# Patient Record
Sex: MALE | Race: WHITE | NOT HISPANIC OR LATINO | Employment: OTHER | URBAN - NONMETROPOLITAN AREA
[De-identification: names, ages, dates, MRNs, and addresses within clinical notes are randomized per-mention and may not be internally consistent; named-entity substitution may affect disease eponyms.]

---

## 2017-01-18 ENCOUNTER — OFFICE VISIT (OUTPATIENT)
Dept: FAMILY MEDICINE CLINIC | Facility: CLINIC | Age: 82
End: 2017-01-18

## 2017-01-18 VITALS
TEMPERATURE: 97.8 F | RESPIRATION RATE: 16 BRPM | WEIGHT: 215 LBS | HEIGHT: 71 IN | OXYGEN SATURATION: 97 % | SYSTOLIC BLOOD PRESSURE: 157 MMHG | BODY MASS INDEX: 30.1 KG/M2 | HEART RATE: 68 BPM | DIASTOLIC BLOOD PRESSURE: 72 MMHG

## 2017-01-18 DIAGNOSIS — J45.40 MODERATE PERSISTENT ASTHMA WITHOUT COMPLICATION: ICD-10-CM

## 2017-01-18 DIAGNOSIS — N18.2 CHRONIC KIDNEY DISEASE, STAGE 2 (MILD): ICD-10-CM

## 2017-01-18 DIAGNOSIS — I10 ESSENTIAL HYPERTENSION: Primary | ICD-10-CM

## 2017-01-18 DIAGNOSIS — G25.81 RLS (RESTLESS LEGS SYNDROME): ICD-10-CM

## 2017-01-18 PROCEDURE — 99213 OFFICE O/P EST LOW 20 MIN: CPT | Performed by: INTERNAL MEDICINE

## 2017-01-18 PROCEDURE — 83874 ASSAY OF MYOGLOBIN: CPT | Performed by: INTERNAL MEDICINE

## 2017-01-18 RX ORDER — GABAPENTIN 300 MG/1
300 CAPSULE ORAL 2 TIMES DAILY
Qty: 180 CAPSULE | Refills: 3 | Status: SHIPPED | OUTPATIENT
Start: 2017-01-18 | End: 2017-03-03

## 2017-01-18 RX ORDER — HYDROCHLOROTHIAZIDE 12.5 MG/1
12.5 CAPSULE, GELATIN COATED ORAL DAILY
Qty: 90 CAPSULE | Refills: 3 | Status: SHIPPED | OUTPATIENT
Start: 2017-01-18 | End: 2018-09-06

## 2017-01-18 RX ORDER — SERTRALINE HYDROCHLORIDE 25 MG/1
25 TABLET, FILM COATED ORAL DAILY
Qty: 90 TABLET | Refills: 3 | Status: SHIPPED | OUTPATIENT
Start: 2017-01-18 | End: 2018-09-06

## 2017-01-18 RX ORDER — LOSARTAN POTASSIUM 100 MG/1
100 TABLET ORAL DAILY
Qty: 90 TABLET | Refills: 1 | Status: SHIPPED | OUTPATIENT
Start: 2017-01-18 | End: 2018-01-31 | Stop reason: SDUPTHER

## 2017-01-18 RX ORDER — PRAMIPEXOLE DIHYDROCHLORIDE 1 MG/1
1 TABLET ORAL NIGHTLY
Qty: 90 TABLET | Refills: 2 | Status: SHIPPED | OUTPATIENT
Start: 2017-01-18 | End: 2017-03-03 | Stop reason: SDUPTHER

## 2017-01-18 NOTE — MR AVS SNAPSHOT
Sukumar Cosby   1/18/2017 2:00 PM   Office Visit    Dept Phone:  [phone]   Encounter #:  [id number]    Provider:  Yobany Nolasco MD   Department:  [facility] PRIMARY CARE                Your Full Care Plan              Today's Medication Changes          These changes are accurate as of: 1/18/17  3:16 PM.  If you have any questions, ask your nurse or doctor.               New Medication(s)Ordered:     hydrochlorothiazide 12.5 MG capsule   Commonly known as:  MICROZIDE   Take 1 capsule by mouth Daily.   Started by:  Yobany Nolasco MD         Medication(s)that have changed:     gabapentin 300 MG capsule   Commonly known as:  NEURONTIN   Take 1 capsule by mouth 2 (Two) Times a Day.   What changed:  how much to take   Changed by:  Yobany Nolasco MD            Where to Get Your Medications      These medications were sent to [id number] - [place], [place] - [address] [id number] [id number]   [address] [facility] [address]     Phone:  [phone]     gabapentin 300 MG capsule    hydrochlorothiazide 12.5 MG capsule    losartan 100 MG tablet    pramipexole 1 MG tablet    sertraline 25 MG tablet                  Your Updated Medication List          This list is accurate as of: 1/18/17  3:16 PM.  Always use your most recent med list.                Abdominal Binder/Elastic 2XL misc       amLODIPine 5 MG tablet   Commonly known as:  NORVASC   Take 1.5 tablets by mouth Daily.       beclomethasone 80 MCG/ACT inhaler   Commonly known as:  QVAR   Inhale 1 puff 2 (Two) Times a Day.       gabapentin 300 MG capsule   Commonly known as:  NEURONTIN   Take 1 capsule by mouth 2 (Two) Times a Day.       hydrochlorothiazide 12.5 MG capsule   Commonly known as:  MICROZIDE   Take 1 capsule by mouth Daily.       losartan 100 MG tablet   Commonly known as:  COZAAR   Take 1 tablet by mouth Daily.       meclizine 25 MG tablet   Commonly known as:  ANTIVERT       OCUVITE ADULT 50+ PO       Omeprazole 20 MG tablet delayed-release       pramipexole 1 MG tablet   Commonly known as:  MIRAPEX   Take 1 tablet by mouth Every Night.       predniSONE 10 MG tablet   Commonly known as:  DELTASONE   Take 1 tablet by mouth Daily.       sertraline 25 MG tablet   Commonly known as:  ZOLOFT   Take 1 tablet by mouth Daily.       Spacer/Aero Chamber Mouthpiece misc   1 can 2 (Two) Times a Day.       VENTOLIN  (90 BASE) MCG/ACT inhaler   Generic drug:  albuterol       ZOSTAVAX 99607 UNT/0.65ML injection   Generic drug:  zoster vaccine live PF               You Were Diagnosed With        Codes Comments    Essential hypertension    -  Primary ICD-10-CM: I10  ICD-9-CM: 401.9     Chronic kidney disease, stage 2 (mild)     ICD-10-CM: N18.2  ICD-9-CM: 585.2     RLS (restless legs syndrome)     ICD-10-CM: G25.81  ICD-9-CM: 333.94     Moderate persistent asthma without complication     ICD-10-CM: J45.40  ICD-9-CM: 493.90       Instructions     None    Patient Instructions History      Upcoming Appointments     Visit Type Date Time Department    OFFICE VISIT 2017  2:00 PM [facility]    OFFICE VISIT 2017  1:00 PM [facility]      [facility]     [facility] allows you to send messages to your doctor, view your test results, renew your prescriptions, schedule appointments, and more. To sign up, go to [employer] and click on the Sign Up Now link in the New User? box. Enter your [employer] Activation Code exactly as it appears below along with the last four digits of your Social Security Number and your Date of Birth () to complete the sign-up process. If you do not sign up before the expiration date, you must request a new code.    Ag[employer]i Activation Code: [email or web address]  Expires: 2017  3:15 PM    If you have questions, you can email [email or web address] or call [phone] to talk to our [employer] staff. Remember, [employer] is NOT  "to be used for urgent needs. For medical emergencies, dial 911.               Other Info from Your Visit           Your Appointments     Apr 21, 2017  1:00 PM EDT   Office Visit with Yobany Nolasco MD   [facility] PRIMARY CARE (--)    [address] [place] [id number] [phone]           Arrive 15 minutes prior to appointment.              Allergies     No Known Allergies      Reason for Visit     Hypertension 6 week follow-up    Chronic Kidney Disease 6 week follow-up    Restless Legs Syndrome 6 week follow-up      Vital Signs     Blood Pressure Pulse Temperature Respirations Height Weight    157/72 (BP Location: Left arm, Patient Position: Sitting, Cuff Size: Large Adult) 68 97.8 °F (36.6 °C) (Oral) 16 71\" (180.3 cm) 215 lb (97.5 kg)    Oxygen Saturation Body Mass Index Smoking Status             97% 29.99 kg/m2 Never Smoker         Problems and Diagnoses Noted     Chronic kidney disease    High blood pressure    RLS (restless legs syndrome)        Asthma            "

## 2017-01-18 NOTE — PROGRESS NOTES
"Subjective   Patient ID: Sukumar Cosby is a 82 y.o. male Pt is here for management of multiple medical problems.  History of Present Illness  Pt is here for management of multiple medical problems.    No soa and off o2. order sent in at his request.\        The following portions of the patient's history were reviewed and updated as appropriate: allergies, current medications, past family history, past medical history, past social history, past surgical history and problem list.  Review of Systems   Constitutional: Negative for fatigue.   Respiratory: Negative for shortness of breath.    All other systems reviewed and are negative.      Objective     Visit Vitals   • /72 (BP Location: Left arm, Patient Position: Sitting, Cuff Size: Large Adult)   • Pulse 68   • Temp 97.8 °F (36.6 °C) (Oral)   • Resp 16   • Ht 71\" (180.3 cm)   • Wt 215 lb (97.5 kg)   • SpO2 97%   • BMI 29.99 kg/m2     Physical Exam  General Appearance:    Alert, cooperative, no distress, appears stated age   Head:    Normocephalic, without obvious abnormality, atraumatic   Eyes:    PERRL, conjunctiva/corneas clear, EOM's intact           Ears:    Normal TM's and external ear canals, both ears   Nose:   Nares normal, septum midline, mucosa normal, no drainage    or sinus tenderness   Throat:   Lips, mucosa, and tongue normal; teeth and gums normal   Neck:   Supple, symmetrical, trachea midline, no adenopathy;        thyroid:  No enlargement/tenderness/nodules; no carotid    bruit or JVD   Back:     Symmetric, no curvature, ROM normal, no CVA tenderness   Lungs:     Wheezing with prolonged bs b/l to auscultation bilaterally, respirations unlabored   Chest wall:    No tenderness or deformity   Heart:    Regular rate and rhythm, S1 and S2 normal, no murmur, rub   or gallop   Abdomen:     Soft, non-tender, bowel sounds active all four quadrants,     no masses, no organomegaly           Extremities:   Extremities normal, atraumatic, no cyanosis or edema "   Pulses:   2+ and symmetric all extremities   Skin:   Skin color, texture, turgor normal, no rashes or lesions   Lymph nodes:   Cervical, supraclavicular, and axillary nodes normal   Neurologic:   CNII-XII intact. Normal strength, sensation and reflexes       throughout       Assessment/Plan     Still using qvar 80.          Sukumar was seen today for hypertension, chronic kidney disease and restless legs syndrome.    Diagnoses and all orders for this visit:    Essential hypertension  -     losartan (COZAAR) 100 MG tablet; Take 1 tablet by mouth Daily.    Chronic kidney disease, stage 2 (mild)    RLS (restless legs syndrome)  -     pramipexole (MIRAPEX) 1 MG tablet; Take 1 tablet by mouth Every Night.    Moderate persistent asthma without complication    Other orders  -     hydrochlorothiazide (MICROZIDE) 12.5 MG capsule; Take 1 capsule by mouth Daily.  -     sertraline (ZOLOFT) 25 MG tablet; Take 1 tablet by mouth Daily.  -     gabapentin (NEURONTIN) 300 MG capsule; Take 1 capsule by mouth 2 (Two) Times a Day.      No Follow-up on file.                   There are no Patient Instructions on file for this visit.

## 2017-03-03 ENCOUNTER — OFFICE VISIT (OUTPATIENT)
Dept: FAMILY MEDICINE CLINIC | Facility: CLINIC | Age: 82
End: 2017-03-03

## 2017-03-03 ENCOUNTER — HOSPITAL ENCOUNTER (OUTPATIENT)
Dept: GENERAL RADIOLOGY | Facility: HOSPITAL | Age: 82
Discharge: HOME | End: 2017-03-03
Attending: INTERNAL MEDICINE | Admitting: INTERNAL MEDICINE

## 2017-03-03 VITALS
OXYGEN SATURATION: 98 % | DIASTOLIC BLOOD PRESSURE: 62 MMHG | WEIGHT: 211 LBS | HEIGHT: 71 IN | HEART RATE: 74 BPM | BODY MASS INDEX: 29.54 KG/M2 | SYSTOLIC BLOOD PRESSURE: 142 MMHG | TEMPERATURE: 97.9 F | RESPIRATION RATE: 16 BRPM

## 2017-03-03 DIAGNOSIS — I10 ESSENTIAL HYPERTENSION: Primary | ICD-10-CM

## 2017-03-03 DIAGNOSIS — M33.20 POLYMYOSITIS (HCC): ICD-10-CM

## 2017-03-03 DIAGNOSIS — N18.2 CHRONIC KIDNEY DISEASE, STAGE 2 (MILD): ICD-10-CM

## 2017-03-03 DIAGNOSIS — G25.81 RLS (RESTLESS LEGS SYNDROME): ICD-10-CM

## 2017-03-03 DIAGNOSIS — J18.9 CAP (COMMUNITY ACQUIRED PNEUMONIA): ICD-10-CM

## 2017-03-03 PROCEDURE — 99214 OFFICE O/P EST MOD 30 MIN: CPT | Performed by: INTERNAL MEDICINE

## 2017-03-03 PROCEDURE — 71020 HC CHEST PA AND LATERAL: CPT

## 2017-03-03 RX ORDER — DOXYCYCLINE 100 MG/1
100 CAPSULE ORAL 2 TIMES DAILY
Qty: 20 CAPSULE | Refills: 0 | Status: SHIPPED | OUTPATIENT
Start: 2017-03-03 | End: 2017-04-21

## 2017-03-03 RX ORDER — PRAMIPEXOLE DIHYDROCHLORIDE 1 MG/1
1 TABLET ORAL NIGHTLY
Qty: 90 TABLET | Refills: 2 | Status: SHIPPED | OUTPATIENT
Start: 2017-03-03 | End: 2017-09-15 | Stop reason: SDUPTHER

## 2017-03-03 RX ORDER — PREDNISONE 1 MG/1
5 TABLET ORAL DAILY
Qty: 7 TABLET | Refills: 0 | Status: SHIPPED | OUTPATIENT
Start: 2017-03-03 | End: 2017-04-21

## 2017-03-03 RX ORDER — AMLODIPINE BESYLATE 5 MG/1
7.5 TABLET ORAL DAILY
Qty: 135 TABLET | Refills: 3 | Status: SHIPPED | OUTPATIENT
Start: 2017-03-03 | End: 2017-09-14 | Stop reason: SDUPTHER

## 2017-03-03 NOTE — PROGRESS NOTES
"Subjective   Patient ID: Sukumar Cosby is a 83 y.o. male Pt is here for management of multiple medical problems.    Chief Complaint   Patient presents with   • URI     patient cooughing up yellow/phlegm x 2 weeks, chest hurts when coughing   • Shortness of Breath     patient having difficulty getting a deep breath   • medication refills     patient needs refills on Pramipexole, Predisone and Amlodipine [facility]       History of Present Illness    Pt with reoccurring cough and congestion. Some soa.  Chest tightness.     The following portions of the patient's history were reviewed and updated as appropriate: allergies, current medications, past family history, past medical history, past social history, past surgical history and problem list.      Review of Systems   Constitutional: Positive for fatigue.   Respiratory: Positive for cough, shortness of breath and wheezing.    All other systems reviewed and are negative.      Objective     Visit Vitals   • /62 (BP Location: Right arm, Patient Position: Sitting, Cuff Size: Large Adult)   • Pulse 74   • Temp 97.9 °F (36.6 °C) (Oral)   • Resp 16   • Ht 71\" (180.3 cm)   • Wt 211 lb (95.7 kg)   • SpO2 98%   • BMI 29.43 kg/m2     Physical Exam  General Appearance:    Alert, cooperative, no distress, appears stated age   Head:    Normocephalic, without obvious abnormality, atraumatic   Eyes:    PERRL, conjunctiva/corneas clear, EOM's intact           Ears:    Normal TM's and external ear canals, both ears   Nose:   Nares normal, septum midline, mucosa normal, no drainage    or sinus tenderness   Throat:   Lips, mucosa, and tongue normal; teeth and gums normal   Neck:   Supple, symmetrical, trachea midline, no adenopathy;        thyroid:  No enlargement/tenderness/nodules; no carotid    bruit or JVD   Back:     Symmetric, no curvature, ROM normal, no CVA tenderness   Lungs:     Fine crackles in left lower lung to auscultation, respirations unlabored   Chest " wall:    No tenderness or deformity   Heart:    Regular rate and rhythm, S1 and S2 normal, no murmur, rub   or gallop   Abdomen:     Soft, non-tender, bowel sounds active all four quadrants,     no masses, no organomegaly           Extremities:   Extremities normal, atraumatic, no cyanosis or edema   Pulses:   2+ and symmetric all extremities   Skin:   Skin color, texture, turgor normal, no rashes or lesions   Lymph nodes:   Cervical, supraclavicular, and axillary nodes normal   Neurologic:   CNII-XII intact. Normal strength, sensation and reflexes       throughout       Assessment/Plan           Ray was seen today for uri, shortness of breath and medication refills.    Diagnoses and all orders for this visit:    Essential hypertension  -     amLODIPine (NORVASC) 5 MG tablet; Take 1.5 tablets by mouth Daily.    Chronic kidney disease, stage 2 (mild)    RLS (restless legs syndrome)  -     pramipexole (MIRAPEX) 1 MG tablet; Take 1 tablet by mouth Every Night.    Polymyositis  -     predniSONE (DELTASONE) 5 MG tablet; Take 1 tablet by mouth Daily.  -     CK Isoenzymes; Future  -     Myoglobin, Serum; Future    CAP (community acquired pneumonia)  -     XR Chest PA & Lateral; Future    Other orders  -     doxycycline (MONODOX) 100 MG capsule; Take 1 capsule by mouth 2 (Two) Times a Day.      Return if symptoms worsen or fail to improve.                   There are no Patient Instructions on file for this visit.

## 2017-03-04 ENCOUNTER — RESULTS ENCOUNTER (OUTPATIENT)
Dept: FAMILY MEDICINE CLINIC | Facility: CLINIC | Age: 82
End: 2017-03-04

## 2017-03-04 DIAGNOSIS — M33.20 POLYMYOSITIS (HCC): ICD-10-CM

## 2017-04-21 ENCOUNTER — OFFICE VISIT (OUTPATIENT)
Dept: FAMILY MEDICINE CLINIC | Facility: CLINIC | Age: 82
End: 2017-04-21

## 2017-04-21 VITALS
SYSTOLIC BLOOD PRESSURE: 136 MMHG | OXYGEN SATURATION: 98 % | DIASTOLIC BLOOD PRESSURE: 69 MMHG | BODY MASS INDEX: 29.68 KG/M2 | WEIGHT: 212 LBS | HEART RATE: 73 BPM | RESPIRATION RATE: 16 BRPM | HEIGHT: 71 IN | TEMPERATURE: 97.6 F

## 2017-04-21 DIAGNOSIS — N18.2 CHRONIC KIDNEY DISEASE, STAGE 2 (MILD): ICD-10-CM

## 2017-04-21 DIAGNOSIS — I10 ESSENTIAL HYPERTENSION: ICD-10-CM

## 2017-04-21 DIAGNOSIS — R42 SPELL OF DIZZINESS: Primary | ICD-10-CM

## 2017-04-21 DIAGNOSIS — R41.3 MEMORY LOSS: ICD-10-CM

## 2017-04-21 DIAGNOSIS — M25.512 ACUTE PAIN OF LEFT SHOULDER: ICD-10-CM

## 2017-04-21 DIAGNOSIS — G47.33 OBSTRUCTIVE SLEEP APNEA SYNDROME: ICD-10-CM

## 2017-04-21 DIAGNOSIS — M19.90 ARTHRITIS: ICD-10-CM

## 2017-04-21 PROCEDURE — G0439 PPPS, SUBSEQ VISIT: HCPCS | Performed by: INTERNAL MEDICINE

## 2017-04-21 PROCEDURE — 93000 ELECTROCARDIOGRAM COMPLETE: CPT | Performed by: INTERNAL MEDICINE

## 2017-04-21 PROCEDURE — 96160 PT-FOCUSED HLTH RISK ASSMT: CPT | Performed by: INTERNAL MEDICINE

## 2017-04-21 PROCEDURE — 99214 OFFICE O/P EST MOD 30 MIN: CPT | Performed by: INTERNAL MEDICINE

## 2017-04-21 PROCEDURE — 99397 PER PM REEVAL EST PAT 65+ YR: CPT | Performed by: INTERNAL MEDICINE

## 2017-04-21 NOTE — PROGRESS NOTES
QUICK REFERENCE INFORMATION:  The ABCs of the Annual Wellness Visit    Initial Medicare Wellness Visit    HEALTH RISK ASSESSMENT    1934    Recent Hospitalizations:  No recent hospitalization(s)..        Current Medical Providers:  Patient Care Team:  Yobany Nolasco MD as PCP - General  Darrell Parker MD as PCP - Family Medicine        Smoking Status:  History   Smoking Status   • Never Smoker   Smokeless Tobacco   • Never Used       Alcohol Consumption:  History   Alcohol Use No       Depression Screen:   No flowsheet data found.    Health Habits and Functional and Cognitive Screening:  No flowsheet data found.               Does the patient have evidence of cognitive impairment? Yes    Asiprin use counseling: Start ASA 81 mg daily       Recent Lab Results:    Visual Acuity:  No exam data present    Age-appropriate Screening Schedule:  Refer to the list below for future screening recommendations based on patient's age, sex and/or medical conditions. Orders for these recommended tests are listed in the plan section. The patient has been provided with a written plan.    Health Maintenance   Topic Date Due   • TDAP/TD VACCINES (1 - Tdap) 02/12/1953   • PNEUMOCOCCAL VACCINES (65+ LOW/MEDIUM RISK) (1 of 2 - PCV13) 02/12/1999   • ZOSTER VACCINE  06/17/2016   • INFLUENZA VACCINE  Completed        Subjective   History of Present Illness    Sukumar Cosby is a 83 y.o. male who presents for an Annual Wellness Visit.    The following portions of the patient's history were reviewed and updated as appropriate: allergies, current medications, past family history, past medical history, past social history, past surgical history and problem list.    Outpatient Medications Prior to Visit   Medication Sig Dispense Refill   • albuterol (VENTOLIN HFA) 108 (90 BASE) MCG/ACT inhaler Ventolin  (90 Base) MCG/ACT Inhalation Aerosol Solution; Patient Sig: Ventolin  (90 Base) MCG/ACT Inhalation Aerosol Solution ; 18; 0;  24-May-2013; Active     • amLODIPine (NORVASC) 5 MG tablet Take 1.5 tablets by mouth Daily. 135 tablet 3   • beclomethasone (QVAR) 80 MCG/ACT inhaler Inhale 1 puff 2 (Two) Times a Day. 8.7 g 11   • Elastic Bandages & Supports (ABDOMINAL BINDER/ELASTIC 2XL) misc      • hydrochlorothiazide (MICROZIDE) 12.5 MG capsule Take 1 capsule by mouth Daily. 90 capsule 3   • losartan (COZAAR) 100 MG tablet Take 1 tablet by mouth Daily. 90 tablet 1   • Multiple Vitamins-Minerals (OCUVITE ADULT 50+ PO) Take  by mouth.     • pramipexole (MIRAPEX) 1 MG tablet Take 1 tablet by mouth Every Night. 90 tablet 2   • sertraline (ZOLOFT) 25 MG tablet Take 1 tablet by mouth Daily. 90 tablet 3   • Spacer/Aero Chamber Mouthpiece misc 1 can 2 (Two) Times a Day. 1 each 0   • zoster vaccine live PF (ZOSTAVAX) 90083 UNT/0.65ML injection Inject  under the skin.     • doxycycline (MONODOX) 100 MG capsule Take 1 capsule by mouth 2 (Two) Times a Day. 20 capsule 0   • predniSONE (DELTASONE) 5 MG tablet Take 1 tablet by mouth Daily. 7 tablet 0     No facility-administered medications prior to visit.        Patient Active Problem List   Diagnosis   • Arthritis   • Ataxic gait   • BMI 30.0-30.9,adult   • BPPV (benign paroxysmal positional vertigo)   • Chronic pain   • Chronic kidney disease   • Depression   • Enlarged prostate without lower urinary tract symptoms (luts)   • GERD without esophagitis   • Gout   • Hypertension   • Hypogonadism in male   • Lumbar radiculopathy   • Peripheral neuropathy   • Restless legs syndrome   • Spinal stenosis   • Ventral hernia   • Polymyositis   • Prostate CA       Advance Care Planning:  has NO advance directive - information provided to the patient today    Identification of Risk Factors:  Risk factors include: weight , unhealthy diet, cardiovascular risk and polypharmacy.    Review of Systems    Compared to one year ago, the patient feels his physical health is the same.  Compared to one year ago, the patient feels  "his mental health is the same.    Objective     Physical Exam    Vitals:    04/21/17 1306   BP: 136/69   BP Location: Left arm   Patient Position: Sitting   Cuff Size: Large Adult   Pulse: 73   Resp: 16   Temp: 97.6 °F (36.4 °C)   TempSrc: Oral   SpO2: 98%   Weight: 212 lb (96.2 kg)   Height: 71\" (180.3 cm)       Body mass index is 29.57 kg/(m^2).  Discussed the patient's BMI with him. The BMI is above average; BMI management plan is completed.    Assessment/Plan   Patient Self-Management and Personalized Health Advice  The patient has been provided with information about: diet, exercise, weight management and fall prevention and preventive services including:   · Advance directive, Fall Risk assessment done.    Visit Diagnoses:    ICD-10-CM ICD-9-CM   1. Spell of dizziness R42 780.4   2. Chronic kidney disease, stage 2 (mild) N18.2 585.2   3. Essential hypertension I10 401.9   4. Arthritis M19.90 716.90   5. Acute pain of left shoulder M25.512 719.41       No orders of the defined types were placed in this encounter.      Outpatient Encounter Prescriptions as of 4/21/2017   Medication Sig Dispense Refill   • albuterol (VENTOLIN HFA) 108 (90 BASE) MCG/ACT inhaler Ventolin  (90 Base) MCG/ACT Inhalation Aerosol Solution; Patient Sig: Ventolin  (90 Base) MCG/ACT Inhalation Aerosol Solution ; 18; 0; 24-May-2013; Active     • amLODIPine (NORVASC) 5 MG tablet Take 1.5 tablets by mouth Daily. 135 tablet 3   • beclomethasone (QVAR) 80 MCG/ACT inhaler Inhale 1 puff 2 (Two) Times a Day. 8.7 g 11   • Elastic Bandages & Supports (ABDOMINAL BINDER/ELASTIC 2XL) misc      • hydrochlorothiazide (MICROZIDE) 12.5 MG capsule Take 1 capsule by mouth Daily. 90 capsule 3   • losartan (COZAAR) 100 MG tablet Take 1 tablet by mouth Daily. 90 tablet 1   • Multiple Vitamins-Minerals (OCUVITE ADULT 50+ PO) Take  by mouth.     • pramipexole (MIRAPEX) 1 MG tablet Take 1 tablet by mouth Every Night. 90 tablet 2   • sertraline " (ZOLOFT) 25 MG tablet Take 1 tablet by mouth Daily. 90 tablet 3   • Spacer/Aero Chamber Mouthpiece misc 1 can 2 (Two) Times a Day. 1 each 0   • zoster vaccine live PF (ZOSTAVAX) 46576 UNT/0.65ML injection Inject  under the skin.     • [DISCONTINUED] doxycycline (MONODOX) 100 MG capsule Take 1 capsule by mouth 2 (Two) Times a Day. 20 capsule 0   • [DISCONTINUED] predniSONE (DELTASONE) 5 MG tablet Take 1 tablet by mouth Daily. 7 tablet 0     No facility-administered encounter medications on file as of 4/21/2017.        Reviewed use of high risk medication in the elderly: yes  Reviewed for potential of harmful drug interactions in the elderly: yes    Follow Up:  No Follow-up on file.     An After Visit Summary and PPPS with all of these plans were given to the patient.

## 2017-04-21 NOTE — PROGRESS NOTES
"Subjective   Patient ID: Sukumar Cosby is a 83 y.o. male Pt is here for management of multiple medical problems.    Chief Complaint   Patient presents with   • Hypertension     follow-up   • Asthma     folloe-up   • Chronic Kidney Disease     follow-up   • Restless Legs Syndrome     follow-up       History of Present Illness    Feels well.    2-3 weeks ago.  Sudden onset soa and burred vision while driving down road. Got sweaty. Rolled down window. Recovered quickly.    Pain in left arm started a few weeks ago.  Not sure if related to event.  Pt on zero turn lawnmower.     Pt sig memory loss.     Poor sleep. Tired. Up a lot at night.       The following portions of the patient's history were reviewed and updated as appropriate: allergies, current medications, past family history, past medical history, past social history, past surgical history and problem list.      Review of Systems   Constitutional: Positive for fatigue.   Musculoskeletal: Positive for arthralgias. Negative for back pain and gait problem.   Neurological: Positive for dizziness and weakness.   Psychiatric/Behavioral: Positive for confusion and sleep disturbance. The patient is not nervous/anxious.    All other systems reviewed and are negative.      Objective     /69 (BP Location: Left arm, Patient Position: Sitting, Cuff Size: Large Adult)  Pulse 73  Temp 97.6 °F (36.4 °C) (Oral)   Resp 16  Ht 71\" (180.3 cm)  Wt 212 lb (96.2 kg)  SpO2 98%  BMI 29.57 kg/m2  Physical Exam  General Appearance:    Alert, cooperative, no distress, appears stated age   Head:    Normocephalic, without obvious abnormality, atraumatic   Eyes:    PERRL, conjunctiva/corneas clear, EOM's intact           Ears:    Normal TM's and external ear canals, both ears   Nose:   Nares normal, septum midline, mucosa normal, no drainage    or sinus tenderness   Throat:   Lips, mucosa, and tongue normal; teeth and gums normal   Neck:   Supple, symmetrical, trachea midline, no " adenopathy;        thyroid:  No enlargement/tenderness/nodules; no carotid    bruit or JVD   Back:     Symmetric, no curvature, ROM normal, no CVA tenderness   Lungs:     Clear to auscultation bilaterally, respirations unlabored   Chest wall:    No tenderness or deformity   Heart:    Regular rate and rhythm, S1 and S2 normal, no murmur, rub   or gallop   Abdomen:     Soft, non-tender, bowel sounds active all four quadrants,     no masses, no organomegaly           Extremities:   Pain on pronation and supination of left arm.   Extremities normal, atraumatic, no cyanosis or edema   Pulses:   2+ and symmetric all extremities   Skin:   Skin color, texture, turgor normal, no rashes or lesions   Lymph nodes:   Cervical, supraclavicular, and axillary nodes normal   Neurologic:   CNII-XII intact. Normal strength, sensation and reflexes       throughout       Assessment/Plan     Procedure   lives in [place].  Check for rmsf.  Give myositis arthritis, memory loss.        Arthrocream for left arm tendinitis.      ECG 12 Lead  Date/Time: 4/21/2017 2:01 PM  Performed by: JOHN MELVIN  Authorized by: JOHN MELVIN   Comparison: compared with previous ECG   Rhythm: sinus rhythm  Q waves: V1, V3 and III  Clinical impression: abnormal ECG and non-specific ECG            Ray was seen today for hypertension, asthma, chronic kidney disease and restless legs syndrome.    Diagnoses and all orders for this visit:    Spell of dizziness  -     Home Sleep Study; Future  -     Reflexed RMSF, IgG, IFA  -     Vitamin B12    Chronic kidney disease, stage 2 (mild)  -     Home Sleep Study; Future  -     Reflexed RMSF, IgG, IFA  -     Vitamin B12    Essential hypertension  -     Home Sleep Study; Future  -     Reflexed RMSF, IgG, IFA  -     Vitamin B12    Arthritis  -     Home Sleep Study; Future  -     Reflexed RMSF, IgG, IFA  -     Vitamin B12    Acute pain of left shoulder  -     Home Sleep Study; Future  -     Reflexed RMSF, IgG, IFA  -      Vitamin B12    Memory loss  -     Home Sleep Study; Future  -     Reflexed RMSF, IgG, IFA  -     Vitamin B12    Obstructive sleep apnea syndrome   -     Home Sleep Study; Future      No Follow-up on file.                   There are no Patient Instructions on file for this visit.

## 2017-04-22 LAB
CK BB CFR SERPL ELPH: 0 %
CK MACRO1 CFR SERPL: 0 %
CK MACRO2 CFR SERPL: 0 %
CK MB CFR SERPL ELPH: 0 % (ref 0–3)
CK MM CFR SERPL ELPH: 100 % (ref 97–100)
CK SERPL-CCNC: 156 U/L (ref 24–204)
MYOGLOBIN SERPL-MCNC: 109.1 NG/ML (ref 0–101)

## 2017-04-25 LAB
R RICKETTSI IGG SER QL IA: NEGATIVE
VIT B12 SERPL-MCNC: 545 PG/ML (ref 239–931)

## 2017-05-05 ENCOUNTER — OFFICE VISIT (OUTPATIENT)
Dept: FAMILY MEDICINE CLINIC | Facility: CLINIC | Age: 82
End: 2017-05-05

## 2017-05-05 VITALS
TEMPERATURE: 97.8 F | BODY MASS INDEX: 29.26 KG/M2 | OXYGEN SATURATION: 99 % | HEIGHT: 71 IN | RESPIRATION RATE: 16 BRPM | WEIGHT: 209 LBS | DIASTOLIC BLOOD PRESSURE: 69 MMHG | SYSTOLIC BLOOD PRESSURE: 157 MMHG | HEART RATE: 64 BPM

## 2017-05-05 DIAGNOSIS — M33.20 POLYMYOSITIS (HCC): ICD-10-CM

## 2017-05-05 DIAGNOSIS — N18.2 CHRONIC KIDNEY DISEASE, STAGE 2 (MILD): ICD-10-CM

## 2017-05-05 DIAGNOSIS — I10 ESSENTIAL HYPERTENSION: Primary | ICD-10-CM

## 2017-05-05 PROCEDURE — 99213 OFFICE O/P EST LOW 20 MIN: CPT | Performed by: INTERNAL MEDICINE

## 2017-05-08 LAB
ANA SER QL: NEGATIVE
DSDNA AB SER-ACNC: <1 IU/ML (ref 0–9)
ENA JO1 AB SER-ACNC: <0.2 AI (ref 0–0.9)

## 2017-06-06 DIAGNOSIS — G47.33 OSA (OBSTRUCTIVE SLEEP APNEA): Primary | ICD-10-CM

## 2017-06-14 ENCOUNTER — OFFICE VISIT (OUTPATIENT)
Dept: FAMILY MEDICINE CLINIC | Facility: CLINIC | Age: 82
End: 2017-06-14

## 2017-06-14 VITALS
HEART RATE: 70 BPM | BODY MASS INDEX: 29.96 KG/M2 | WEIGHT: 214 LBS | RESPIRATION RATE: 16 BRPM | TEMPERATURE: 98 F | DIASTOLIC BLOOD PRESSURE: 73 MMHG | OXYGEN SATURATION: 98 % | SYSTOLIC BLOOD PRESSURE: 144 MMHG | HEIGHT: 71 IN

## 2017-06-14 DIAGNOSIS — R09.02 HYPOXIA: ICD-10-CM

## 2017-06-14 DIAGNOSIS — M75.52 DELTOID BURSITIS, LEFT: ICD-10-CM

## 2017-06-14 DIAGNOSIS — I10 ESSENTIAL HYPERTENSION: Primary | ICD-10-CM

## 2017-06-14 DIAGNOSIS — N18.2 CHRONIC KIDNEY DISEASE, STAGE 2 (MILD): ICD-10-CM

## 2017-06-14 DIAGNOSIS — M70.62 TROCHANTERIC BURSITIS OF LEFT HIP: ICD-10-CM

## 2017-06-14 PROCEDURE — 99214 OFFICE O/P EST MOD 30 MIN: CPT | Performed by: INTERNAL MEDICINE

## 2017-06-14 PROCEDURE — 20610 DRAIN/INJ JOINT/BURSA W/O US: CPT | Performed by: INTERNAL MEDICINE

## 2017-06-14 PROCEDURE — 96372 THER/PROPH/DIAG INJ SC/IM: CPT | Performed by: INTERNAL MEDICINE

## 2017-06-14 RX ORDER — TRIAMCINOLONE ACETONIDE 40 MG/ML
40 INJECTION, SUSPENSION INTRA-ARTICULAR; INTRAMUSCULAR ONCE
Status: COMPLETED | OUTPATIENT
Start: 2017-06-14 | End: 2017-06-14

## 2017-06-14 RX ADMIN — TRIAMCINOLONE ACETONIDE 40 MG: 40 INJECTION, SUSPENSION INTRA-ARTICULAR; INTRAMUSCULAR at 17:07

## 2017-06-14 NOTE — PROGRESS NOTES
"Subjective   Patient ID: Sukumar Cosby is a 83 y.o. male Pt is here for management of multiple medical problems.    Chief Complaint   Patient presents with   • Hypertension     follow-up       History of Present Illness    Pt with f/u to day.     Pain in right arm and leg. Still with sig vertigo.    Tired.   Pain in left shoulder. And leg. No otc. Duration 2 weeks. No trauma.   Works hard..         The following portions of the patient's history were reviewed and updated as appropriate: allergies, current medications, past family history, past medical history, past social history, past surgical history and problem list.      Review of Systems   Constitutional: Positive for fatigue.   Respiratory: Positive for shortness of breath.    All other systems reviewed and are negative.      Objective     /73 (BP Location: Left arm, Patient Position: Sitting, Cuff Size: Large Adult)  Pulse 70  Temp 98 °F (36.7 °C) (Oral)   Resp 16  Ht 71\" (180.3 cm)  Wt 214 lb (97.1 kg)  SpO2 98%  BMI 29.85 kg/m2  Physical Exam  General Appearance:    Alert, cooperative, no distress, appears stated age   Head:    Normocephalic, without obvious abnormality, atraumatic   Eyes:    PERRL, conjunctiva/corneas clear, EOM's intact           Ears:    Normal TM's and external ear canals, both ears   Nose:   Nares normal, septum midline, mucosa normal, no drainage    or sinus tenderness   Throat:   Lips, mucosa, and tongue normal; teeth and gums normal   Neck:   Supple, symmetrical, trachea midline, no adenopathy;        thyroid:  No enlargement/tenderness/nodules; no carotid    bruit or JVD   Back:     Symmetric, no curvature, ROM normal, no CVA tenderness   Lungs:     Clear to auscultation bilaterally, respirations unlabored   Chest wall:    No tenderness or deformity   Heart:    Regular rate and rhythm, S1 and S2 normal, no murmur, rub   or gallop   Abdomen:     Soft, non-tender, bowel sounds active all four quadrants,     no masses, no " organomegaly           Extremities:   ttp left deltoid bura. Crepitus.ttp left troch.    Pulses:   2+ and symmetric all extremities   Skin:   Skin color, texture, turgor normal, no rashes or lesions   Lymph nodes:   Cervical, supraclavicular, and axillary nodes normal   Neurologic:   CNII-XII intact. Normal strength, sensation and reflexes       throughout       Assessment/Plan       Low o2 overnight. Pt qualified for o2.       Shoulder inj.    Indication: pain left   Pt consented. anerior shoulder prepped. Under sterile technique shoulder was injected with Kenalog  40mg 1 cc and lidocaine 1 cc 1%.  Pt tolerated procedure well.        Ray was seen today for hypertension.    Diagnoses and all orders for this visit:    Essential hypertension  -     Comprehensive Metabolic Panel  -     Lipid Panel  -     T4, Free  -     TSH  -     Vitamin B12    Chronic kidney disease, stage 2 (mild)  -     Comprehensive Metabolic Panel  -     Lipid Panel  -     T4, Free  -     TSH  -     Vitamin B12    Hypoxia  -     Oxygen Therapy; Future  -     Overnight Sleep Oximetry Study  -     Comprehensive Metabolic Panel  -     Lipid Panel  -     T4, Free  -     TSH  -     Vitamin B12    Deltoid bursitis, left  -     triamcinolone acetonide (KENALOG-40) injection 40 mg; Inject 1 mL into the joint 1 (One) Time.    Trochanteric bursitis of left hip      Return in about 3 months (around 9/14/2017).    Cancel muscle bx.                  There are no Patient Instructions on file for this visit.

## 2017-06-30 ENCOUNTER — TELEPHONE (OUTPATIENT)
Dept: FAMILY MEDICINE CLINIC | Facility: CLINIC | Age: 82
End: 2017-06-30

## 2017-07-31 ENCOUNTER — TELEPHONE (OUTPATIENT)
Dept: FAMILY MEDICINE CLINIC | Facility: CLINIC | Age: 82
End: 2017-07-31

## 2017-08-02 NOTE — TELEPHONE ENCOUNTER
I called Mr. Cosby, to make sure that he wants these medications sent to this pharmacy, but I had to leave a message. Patient only has the [facility] listed in his chart.

## 2017-08-03 NOTE — TELEPHONE ENCOUNTER
I spoke with Sukumar Cosby this morning, he states he does not want to use the [facility] order pharmacy, he has not been in contact with them. Patient only wants to use the local [facility].

## 2017-08-11 ENCOUNTER — TELEPHONE (OUTPATIENT)
Dept: FAMILY MEDICINE CLINIC | Facility: CLINIC | Age: 82
End: 2017-08-11

## 2017-08-14 ENCOUNTER — OFFICE VISIT (OUTPATIENT)
Dept: FAMILY MEDICINE CLINIC | Facility: CLINIC | Age: 82
End: 2017-08-14

## 2017-08-14 DIAGNOSIS — Z00.00 ROUTINE GENERAL MEDICAL EXAMINATION AT A HEALTH CARE FACILITY: Primary | ICD-10-CM

## 2017-08-14 RX ORDER — METHYLPREDNISOLONE 4 MG/1
TABLET ORAL
Qty: 1 EACH | Refills: 0 | Status: SHIPPED | OUTPATIENT
Start: 2017-08-14 | End: 2017-09-14

## 2017-09-14 ENCOUNTER — HOSPITAL ENCOUNTER (OUTPATIENT)
Dept: GENERAL RADIOLOGY | Facility: HOSPITAL | Age: 82
Discharge: HOME | End: 2017-09-14
Attending: INTERNAL MEDICINE | Admitting: INTERNAL MEDICINE

## 2017-09-14 ENCOUNTER — OFFICE VISIT (OUTPATIENT)
Dept: FAMILY MEDICINE CLINIC | Facility: CLINIC | Age: 82
End: 2017-09-14

## 2017-09-14 VITALS
HEART RATE: 85 BPM | BODY MASS INDEX: 29.68 KG/M2 | TEMPERATURE: 98.6 F | RESPIRATION RATE: 16 BRPM | SYSTOLIC BLOOD PRESSURE: 155 MMHG | HEIGHT: 71 IN | OXYGEN SATURATION: 98 % | WEIGHT: 212 LBS | DIASTOLIC BLOOD PRESSURE: 68 MMHG

## 2017-09-14 DIAGNOSIS — N18.2 CHRONIC KIDNEY DISEASE, STAGE 2 (MILD): ICD-10-CM

## 2017-09-14 DIAGNOSIS — M33.20 POLYMYOSITIS (HCC): ICD-10-CM

## 2017-09-14 DIAGNOSIS — M54.50 CHRONIC BILATERAL LOW BACK PAIN WITHOUT SCIATICA: ICD-10-CM

## 2017-09-14 DIAGNOSIS — I10 ESSENTIAL HYPERTENSION: Primary | ICD-10-CM

## 2017-09-14 DIAGNOSIS — I10 ESSENTIAL HYPERTENSION: ICD-10-CM

## 2017-09-14 DIAGNOSIS — G89.29 CHRONIC BILATERAL LOW BACK PAIN WITHOUT SCIATICA: ICD-10-CM

## 2017-09-14 PROCEDURE — 72114 X-RAY EXAM L-S SPINE BENDING: CPT

## 2017-09-14 PROCEDURE — 99214 OFFICE O/P EST MOD 30 MIN: CPT | Performed by: INTERNAL MEDICINE

## 2017-09-14 RX ORDER — AMLODIPINE BESYLATE 5 MG/1
5 TABLET ORAL DAILY
Qty: 90 TABLET | Refills: 3
Start: 2017-09-14 | End: 2017-09-15 | Stop reason: SDUPTHER

## 2017-09-14 NOTE — PROGRESS NOTES
"Subjective   Patient ID: Sukumar Cosby is a 83 y.o. male Pt is here for management of multiple medical problems.    Chief Complaint   Patient presents with   • Hypertension     follow-up       Muscle Pain   This is a chronic problem. The current episode started more than 1 year ago. The problem occurs constantly. The problem has been gradually worsening since onset. The pain is present in the right hip, right knee, right lower leg, upper back, upper right arm, upper left arm, right wrist and right upper leg. The symptoms are aggravated by any movement. Associated symptoms include fatigue. Pertinent negatives include no shortness of breath. The swelling is presently diffuse. He has been less active.       Feels well.     Tolerating meds well.     No soa.  No cp.   Arthritis getting worse. Hard to get up.    dizziness with getting up and walking.        The following portions of the patient's history were reviewed and updated as appropriate: allergies, current medications, past family history, past medical history, past social history, past surgical history and problem list.      Review of Systems   Constitutional: Positive for fatigue.   Respiratory: Negative for shortness of breath.    Musculoskeletal: Positive for back pain, gait problem and myalgias.   All other systems reviewed and are negative.      Objective     /68 (BP Location: Left arm, Patient Position: Sitting, Cuff Size: Adult)  Pulse 85  Temp 98.6 °F (37 °C) (Oral)   Resp 16  Ht 71\" (180.3 cm)  Wt 212 lb (96.2 kg)  SpO2 98%  BMI 29.57 kg/m2  Physical Exam  General Appearance:    Alert, cooperative, no distress, appears stated age   Head:    Normocephalic, without obvious abnormality, atraumatic   Eyes:    PERRL, conjunctiva/corneas clear, EOM's intact           Ears:    Normal TM's and external ear canals, both ears   Nose:   Nares normal, septum midline, mucosa normal, no drainage    or sinus tenderness   Throat:   Lips, mucosa, and tongue " normal; teeth and gums normal   Neck:   Supple, symmetrical, trachea midline, no adenopathy;        thyroid:  No enlargement/tenderness/nodules; no carotid    bruit or JVD   Back:     Symmetric, no curvature, ROM normal, no CVA tenderness   Lungs:     Clear to auscultation bilaterally, respirations unlabored   Chest wall:    No tenderness or deformity   Heart:    Regular rate and rhythm, S1 and S2 normal, no murmur, rub   or gallop   Abdomen:     Soft, non-tender, bowel sounds active all four quadrants,     no masses, no organomegaly           Extremities:   ttp lower legs. And arms./     Pulses:   2+ and symmetric all extremities   Skin:   Skin color, texture, turgor normal, no rashes or lesions   Lymph nodes:   Cervical, supraclavicular, and axillary nodes normal   Neurologic:   CNII-XII intact. Normal strength, sensation and reflexes       throughout       Assessment/Plan     bp not well controlled and pt symptomatic. decrease Norvasc to 5 mg.      Pain in leg sig.   Hot shower helped.    Multiple muscle groups with spasm and pain    There are no diagnoses linked to this encounter.  No Follow-up on file.                   There are no Patient Instructions on file for this visit.

## 2017-09-15 ENCOUNTER — TELEPHONE (OUTPATIENT)
Dept: FAMILY MEDICINE CLINIC | Facility: CLINIC | Age: 82
End: 2017-09-15

## 2017-09-15 DIAGNOSIS — G25.81 RLS (RESTLESS LEGS SYNDROME): ICD-10-CM

## 2017-09-15 DIAGNOSIS — I10 ESSENTIAL HYPERTENSION: ICD-10-CM

## 2017-09-15 RX ORDER — PRAMIPEXOLE DIHYDROCHLORIDE 1 MG/1
1 TABLET ORAL NIGHTLY
Qty: 90 TABLET | Refills: 2 | Status: SHIPPED | OUTPATIENT
Start: 2017-09-15 | End: 2018-09-06

## 2017-09-15 RX ORDER — AMLODIPINE BESYLATE 5 MG/1
5 TABLET ORAL DAILY
Qty: 90 TABLET | Refills: 3 | Status: SHIPPED | OUTPATIENT
Start: 2017-09-15 | End: 2022-02-25 | Stop reason: SDUPTHER

## 2017-09-18 LAB
A PHAGOCYTOPH IGG TITR SER IF: NEGATIVE {TITER}
A PHAGOCYTOPH IGM TITR SER IF: NEGATIVE {TITER}
E CHAFFEENSIS IGG TITR SER IF: NEGATIVE {TITER}
E CHAFFEENSIS IGM TITR SER IF: NEGATIVE {TITER}
MYOGLOBIN SERPL-MCNC: 52.9 NG/ML (ref 0–101)
R RICKETTSI IGG SER QL IA: POSITIVE
R RICKETTSI IGG TITR SER IF: ABNORMAL {TITER}
R RICKETTSI IGM TITR SER: 0.37 INDEX (ref 0–0.89)
T3FREE SERPL-MCNC: 3.1 PG/ML (ref 2–4.4)
T4 FREE SERPL-MCNC: 0.89 NG/DL (ref 0.78–2.19)
TSH SERPL DL<=0.005 MIU/L-ACNC: 0.28 MIU/ML (ref 0.47–4.68)
VIT B12 SERPL-MCNC: 490 PG/ML (ref 239–931)

## 2017-09-19 RX ORDER — DOXYCYCLINE 100 MG/1
100 CAPSULE ORAL 2 TIMES DAILY
Qty: 60 CAPSULE | Refills: 0 | Status: SHIPPED | OUTPATIENT
Start: 2017-09-19 | End: 2017-10-23

## 2017-10-23 ENCOUNTER — OFFICE VISIT (OUTPATIENT)
Dept: FAMILY MEDICINE CLINIC | Facility: CLINIC | Age: 82
End: 2017-10-23

## 2017-10-23 VITALS — SYSTOLIC BLOOD PRESSURE: 160 MMHG | HEART RATE: 85 BPM | DIASTOLIC BLOOD PRESSURE: 50 MMHG

## 2017-10-23 DIAGNOSIS — A79.9 RICKETTSIOSES: ICD-10-CM

## 2017-10-23 DIAGNOSIS — R53.1 WEAKNESS GENERALIZED: ICD-10-CM

## 2017-10-23 DIAGNOSIS — R53.83 FATIGUE, UNSPECIFIED TYPE: ICD-10-CM

## 2017-10-23 DIAGNOSIS — R42 VERTIGO: ICD-10-CM

## 2017-10-23 DIAGNOSIS — M10.079 IDIOPATHIC GOUT OF FOOT, UNSPECIFIED CHRONICITY, UNSPECIFIED LATERALITY: Primary | ICD-10-CM

## 2017-10-23 DIAGNOSIS — I10 ESSENTIAL HYPERTENSION: ICD-10-CM

## 2017-10-23 DIAGNOSIS — R97.20 ELEVATED PSA: ICD-10-CM

## 2017-10-23 PROCEDURE — 99214 OFFICE O/P EST MOD 30 MIN: CPT | Performed by: INTERNAL MEDICINE

## 2017-10-23 RX ORDER — CARVEDILOL 3.12 MG/1
3.12 TABLET ORAL 2 TIMES DAILY WITH MEALS
Qty: 60 TABLET | Refills: 11 | Status: SHIPPED | OUTPATIENT
Start: 2017-10-23 | End: 2018-09-17 | Stop reason: SDUPTHER

## 2017-10-23 RX ORDER — MINOCYCLINE HYDROCHLORIDE 100 MG/1
100 CAPSULE ORAL 2 TIMES DAILY
Qty: 30 CAPSULE | Refills: 11 | Status: SHIPPED | OUTPATIENT
Start: 2017-10-23 | End: 2017-12-06

## 2017-10-23 RX ORDER — ALLOPURINOL 100 MG/1
100 TABLET ORAL DAILY
Qty: 30 TABLET | Refills: 11 | Status: SHIPPED | OUTPATIENT
Start: 2017-10-23 | End: 2018-11-29 | Stop reason: SDUPTHER

## 2017-10-23 NOTE — PROGRESS NOTES
Subjective     Patient ID: Sukumar Cosby is a 83 y.o. male. Patient is here for management of multiple medical problems.   Pt with c/o arthritis and other issues.      No chief complaint on file.    History of Present Illness     Feels tired all the time. Weak and veritgo.    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 Pt take mvi at same time he take doxycycline.   Take b vit.       Left foot toe pain with certain foods.        The following portions of the patient's history were reviewed and updated as appropriate: allergies, current medications, past family history, past medical history, past social history, past surgical history and problem list.    Review of Systems   Constitutional: Positive for fatigue.   Musculoskeletal: Positive for arthralgias, gait problem and joint swelling.   Neurological: Positive for dizziness and weakness.   Psychiatric/Behavioral: Negative for sleep disturbance.   All other systems reviewed and are negative.      Current Outpatient Prescriptions:   •  albuterol (VENTOLIN HFA) 108 (90 BASE) MCG/ACT inhaler, Ventolin  (90 Base) MCG/ACT Inhalation Aerosol Solution; Patient Sig: Ventolin  (90 Base) MCG/ACT Inhalation Aerosol Solution ; 18; 0; 24-May-2013; Active, Disp: , Rfl:   •  allopurinol (ZYLOPRIM) 100 MG tablet, Take 1 tablet by mouth Daily., Disp: 30 tablet, Rfl: 11  •  amLODIPine (NORVASC) 5 MG tablet, Take 1 tablet by mouth Daily., Disp: 90 tablet, Rfl: 3  •  carvedilol (COREG) 3.125 MG tablet, Take 1 tablet by mouth 2 (Two) Times a Day With Meals., Disp: 60 tablet, Rfl: 11  •  Elastic Bandages & Supports (ABDOMINAL BINDER/ELASTIC 2XL) misc, , Disp: , Rfl:   •  Fluticasone Furoate 100 MCG/ACT aerosol powder , Inhale 100 mcg Daily., Disp: 30 each, Rfl: 11  •  hydrochlorothiazide (MICROZIDE) 12.5 MG capsule, Take 1 capsule by mouth Daily., Disp: 90 capsule, Rfl: 3  •  losartan (COZAAR) 100 MG tablet, Take 1 tablet by mouth Daily., Disp: 90 tablet, Rfl: 1  •  minocycline (MINOCIN)  100 MG capsule, Take 1 capsule by mouth 2 (Two) Times a Day., Disp: 30 capsule, Rfl: 11  •  Multiple Vitamins-Minerals (OCUVITE ADULT 50+ PO), Take  by mouth., Disp: , Rfl:   •  pramipexole (MIRAPEX) 1 MG tablet, Take 1 tablet by mouth Every Night., Disp: 90 tablet, Rfl: 2  •  sertraline (ZOLOFT) 25 MG tablet, Take 1 tablet by mouth Daily., Disp: 90 tablet, Rfl: 3  •  Spacer/Aero Chamber Mouthpiece misc, 1 can 2 (Two) Times a Day., Disp: 1 each, Rfl: 0  •  zoster vaccine live PF (ZOSTAVAX) 58574 UNT/0.65ML injection, Inject  under the skin., Disp: , Rfl:     Objective      Blood pressure 160/50, pulse 85.    Physical Exam     General Appearance:    Alert, cooperative, no distress, appears stated age   Head:    Normocephalic, without obvious abnormality, atraumatic   Eyes:    PERRL, conjunctiva/corneas clear, EOM's intact   Ears:    Normal TM's and external ear canals, both ears   Nose:   Nares normal, septum midline, mucosa normal, no drainage   or sinus tenderness   Throat:   Lips, mucosa, and tongue normal; teeth and gums normal   Neck:   Supple, symmetrical, trachea midline, no adenopathy;        thyroid:  No enlargement/tenderness/nodules; no carotid    bruit or JVD   Back:     Symmetric, no curvature, ROM normal, no CVA tenderness   Lungs:     Clear to auscultation bilaterally, respirations unlabored   Chest wall:    No tenderness or deformity   Heart:    Regular rate and rhythm, S1 and S2 normal, no murmur,        rub or gallop   Abdomen:     Soft, non-tender, bowel sounds active all four quadrants,     no masses, no organomegaly   Extremities:   Extremities normal, atraumatic, no cyanosis or edema   Pulses:   2+ and symmetric all extremities   Skin:   Skin color, texture, turgor normal, no rashes or lesions   Lymph nodes:   Cervical, supraclavicular, and axillary nodes normal   Neurologic:   CNII-XII intact. Normal strength, sensation and reflexes       throughout      Results for orders placed or performed  in visit on 09/14/17   [place] [facility] (IgG / M)   Result Value Ref Range    RMSF IgG Positive (A) Negative    RMSF IgM 0.37 0.00 - 0.89 index   Ehrlichia Antibody Panel   Result Value Ref Range    E. chaffeensis ([facility]) IgG Titer Negative Neg:<1:64    E. chaffeensis ([facility]) IgM Titer Negative Neg:<1:20    [facility] IgG Titer Negative Neg:<1:64    [facility] IgM Titer Negative Neg:<1:20   Vitamin B12   Result Value Ref Range    Vitamin B-12 490 239 - 931 pg/mL   TSH   Result Value Ref Range    TSH 0.275 (L) 0.470 - 4.680 mIU/mL   T4, Free   Result Value Ref Range    Free T4 0.89 0.78 - 2.19 ng/dL   T3, Free   Result Value Ref Range    T3, Free 3.1 2.0 - 4.4 pg/mL   Myoglobin, Serum   Result Value Ref Range    Myoglobin 52.9 0.0 - 101.0 ng/mL   Reflexed RMSF, IgG, IFA   Result Value Ref Range    RMSF IgG 1:128 (H) Neg <1:64         Assessment/Plan   Left foot toe pain worse with certain foods.      Diagnoses and all orders for this visit:    Idiopathic gout of foot, unspecified chronicity, unspecified laterality  -     minocycline (MINOCIN) 100 MG capsule; Take 1 capsule by mouth 2 (Two) Times a Day.  -     allopurinol (ZYLOPRIM) 100 MG tablet; Take 1 tablet by mouth Daily.  -     Vitamin B6  -     Uric Acid  -     Cancel: TSH  -     Cancel: T4, Free  -     Cancel: Vitamin B12  -     Comprehensive Metabolic Panel  -     CBC & Differential  -     PSA    Rickettsioses  -     minocycline (MINOCIN) 100 MG capsule; Take 1 capsule by mouth 2 (Two) Times a Day.  -     allopurinol (ZYLOPRIM) 100 MG tablet; Take 1 tablet by mouth Daily.  -     Vitamin B6  -     Uric Acid  -     Cancel: TSH  -     Cancel: T4, Free  -     Cancel: Vitamin B12  -     Comprehensive Metabolic Panel  -     CBC & Differential  -     PSA    Fatigue, unspecified type  -     minocycline (MINOCIN) 100 MG capsule; Take 1 capsule by mouth 2 (Two) Times a Day.  -     allopurinol (ZYLOPRIM) 100 MG tablet; Take 1 tablet by mouth Daily.  -     Vitamin B6  -     Uric Acid  -      Cancel: TSH  -     Cancel: T4, Free  -     Cancel: Vitamin B12  -     Comprehensive Metabolic Panel  -     CBC & Differential  -     PSA    Weakness generalized  -     minocycline (MINOCIN) 100 MG capsule; Take 1 capsule by mouth 2 (Two) Times a Day.  -     allopurinol (ZYLOPRIM) 100 MG tablet; Take 1 tablet by mouth Daily.  -     Vitamin B6  -     Uric Acid  -     Cancel: TSH  -     Cancel: T4, Free  -     Cancel: Vitamin B12  -     Comprehensive Metabolic Panel  -     CBC & Differential  -     PSA    Vertigo  -     minocycline (MINOCIN) 100 MG capsule; Take 1 capsule by mouth 2 (Two) Times a Day.  -     allopurinol (ZYLOPRIM) 100 MG tablet; Take 1 tablet by mouth Daily.  -     Vitamin B6  -     Uric Acid  -     Cancel: TSH  -     Cancel: T4, Free  -     Cancel: Vitamin B12  -     Comprehensive Metabolic Panel  -     CBC & Differential  -     PSA    Elevated PSA  -     minocycline (MINOCIN) 100 MG capsule; Take 1 capsule by mouth 2 (Two) Times a Day.  -     allopurinol (ZYLOPRIM) 100 MG tablet; Take 1 tablet by mouth Daily.  -     Vitamin B6  -     Uric Acid  -     Cancel: Vitamin B12  -     Comprehensive Metabolic Panel  -     CBC & Differential  -     PSA    Essential hypertension  -     carvedilol (COREG) 3.125 MG tablet; Take 1 tablet by mouth 2 (Two) Times a Day With Meals.      Return in about 6 weeks (around 12/4/2017).          There are no Patient Instructions on file for this visit.     Yobany Nolasco MD    Assessment/Plan           Diagnoses and all orders for this visit:    Idiopathic gout of foot, unspecified chronicity, unspecified laterality  -     minocycline (MINOCIN) 100 MG capsule; Take 1 capsule by mouth 2 (Two) Times a Day.  -     allopurinol (ZYLOPRIM) 100 MG tablet; Take 1 tablet by mouth Daily.  -     Vitamin B6  -     Uric Acid  -     Cancel: TSH  -     Cancel: T4, Free  -     Cancel: Vitamin B12  -     Comprehensive Metabolic Panel  -     CBC & Differential  -      PSA    Rickettsioses  -     minocycline (MINOCIN) 100 MG capsule; Take 1 capsule by mouth 2 (Two) Times a Day.  -     allopurinol (ZYLOPRIM) 100 MG tablet; Take 1 tablet by mouth Daily.  -     Vitamin B6  -     Uric Acid  -     Cancel: TSH  -     Cancel: T4, Free  -     Cancel: Vitamin B12  -     Comprehensive Metabolic Panel  -     CBC & Differential  -     PSA    Fatigue, unspecified type  -     minocycline (MINOCIN) 100 MG capsule; Take 1 capsule by mouth 2 (Two) Times a Day.  -     allopurinol (ZYLOPRIM) 100 MG tablet; Take 1 tablet by mouth Daily.  -     Vitamin B6  -     Uric Acid  -     Cancel: TSH  -     Cancel: T4, Free  -     Cancel: Vitamin B12  -     Comprehensive Metabolic Panel  -     CBC & Differential  -     PSA    Weakness generalized  -     minocycline (MINOCIN) 100 MG capsule; Take 1 capsule by mouth 2 (Two) Times a Day.  -     allopurinol (ZYLOPRIM) 100 MG tablet; Take 1 tablet by mouth Daily.  -     Vitamin B6  -     Uric Acid  -     Cancel: TSH  -     Cancel: T4, Free  -     Cancel: Vitamin B12  -     Comprehensive Metabolic Panel  -     CBC & Differential  -     PSA    Vertigo  -     minocycline (MINOCIN) 100 MG capsule; Take 1 capsule by mouth 2 (Two) Times a Day.  -     allopurinol (ZYLOPRIM) 100 MG tablet; Take 1 tablet by mouth Daily.  -     Vitamin B6  -     Uric Acid  -     Cancel: TSH  -     Cancel: T4, Free  -     Cancel: Vitamin B12  -     Comprehensive Metabolic Panel  -     CBC & Differential  -     PSA    Elevated PSA  -     minocycline (MINOCIN) 100 MG capsule; Take 1 capsule by mouth 2 (Two) Times a Day.  -     allopurinol (ZYLOPRIM) 100 MG tablet; Take 1 tablet by mouth Daily.  -     Vitamin B6  -     Uric Acid  -     Cancel: Vitamin B12  -     Comprehensive Metabolic Panel  -     CBC & Differential  -     PSA    Essential hypertension  -     carvedilol (COREG) 3.125 MG tablet; Take 1 tablet by mouth 2 (Two) Times a Day With Meals.      Return in about 6 weeks (around  12/4/2017).                   There are no Patient Instructions on file for this visit.

## 2017-10-24 DIAGNOSIS — R97.20 ELEVATED PSA: Primary | ICD-10-CM

## 2017-10-24 LAB — PSA SERPL-MCNC: 6.13 NG/ML (ref 0.06–4)

## 2017-10-26 LAB
ALBUMIN SERPL-MCNC: 4.4 G/DL (ref 3.5–5)
ALBUMIN/GLOB SERPL: 1.6 G/DL (ref 1–2)
ALP SERPL-CCNC: 75 U/L (ref 38–126)
ALT SERPL-CCNC: 36 U/L (ref 13–69)
AST SERPL-CCNC: 27 U/L (ref 15–46)
BASOPHILS # BLD AUTO: 0.05 10*3/MM3 (ref 0–0.2)
BASOPHILS NFR BLD AUTO: 0.8 % (ref 0–2.5)
BILIRUB SERPL-MCNC: 0.4 MG/DL (ref 0.2–1.3)
BUN SERPL-MCNC: 26 MG/DL (ref 7–20)
BUN/CREAT SERPL: 20 (ref 6.3–21.9)
CALCIUM SERPL-MCNC: 10.1 MG/DL (ref 8.4–10.2)
CHLORIDE SERPL-SCNC: 99 MMOL/L (ref 98–107)
CO2 SERPL-SCNC: 29 MMOL/L (ref 26–30)
CREAT SERPL-MCNC: 1.3 MG/DL (ref 0.6–1.3)
EOSINOPHIL # BLD AUTO: 0.1 10*3/MM3 (ref 0–0.7)
EOSINOPHIL NFR BLD AUTO: 1.5 % (ref 0–7)
ERYTHROCYTE [DISTWIDTH] IN BLOOD BY AUTOMATED COUNT: 13.6 % (ref 11.5–14.5)
GFR SERPLBLD CREATININE-BSD FMLA CKD-EPI: 53 ML/MIN/1.73
GFR SERPLBLD CREATININE-BSD FMLA CKD-EPI: 64 ML/MIN/1.73
GLOBULIN SER CALC-MCNC: 2.7 GM/DL
GLUCOSE SERPL-MCNC: 106 MG/DL (ref 74–98)
HCT VFR BLD AUTO: 39.4 % (ref 42–52)
HGB BLD-MCNC: 12.9 G/DL (ref 14–18)
IMM GRANULOCYTES # BLD: 0.03 10*3/MM3 (ref 0–0.06)
IMM GRANULOCYTES NFR BLD: 0.5 % (ref 0–0.6)
LYMPHOCYTES # BLD AUTO: 1.68 10*3/MM3 (ref 0.6–3.4)
LYMPHOCYTES NFR BLD AUTO: 25.5 % (ref 10–50)
MCH RBC QN AUTO: 29.5 PG (ref 27–31)
MCHC RBC AUTO-ENTMCNC: 32.7 G/DL (ref 30–37)
MCV RBC AUTO: 90 FL (ref 80–94)
MONOCYTES # BLD AUTO: 0.63 10*3/MM3 (ref 0–0.9)
MONOCYTES NFR BLD AUTO: 9.6 % (ref 0–12)
NEUTROPHILS # BLD AUTO: 4.09 10*3/MM3 (ref 2–6.9)
NEUTROPHILS NFR BLD AUTO: 62.1 % (ref 37–80)
NRBC BLD AUTO-RTO: 0 /100 WBC (ref 0–0)
PLATELET # BLD AUTO: 200 10*3/MM3 (ref 130–400)
POTASSIUM SERPL-SCNC: 4.7 MMOL/L (ref 3.5–5.1)
PROT SERPL-MCNC: 7.1 G/DL (ref 6.3–8.2)
RBC # BLD AUTO: 4.38 10*6/MM3 (ref 4.7–6.1)
SODIUM SERPL-SCNC: 141 MMOL/L (ref 137–145)
URATE SERPL-MCNC: 6.2 MG/DL (ref 2.5–8.5)
VIT B6 SERPL-MCNC: 92.4 UG/L (ref 5.3–46.7)
WBC # BLD AUTO: 6.58 10*3/MM3 (ref 4.8–10.8)

## 2017-12-06 ENCOUNTER — OFFICE VISIT (OUTPATIENT)
Dept: FAMILY MEDICINE CLINIC | Facility: CLINIC | Age: 82
End: 2017-12-06

## 2017-12-06 VITALS
TEMPERATURE: 97.9 F | BODY MASS INDEX: 29.54 KG/M2 | RESPIRATION RATE: 16 BRPM | DIASTOLIC BLOOD PRESSURE: 66 MMHG | WEIGHT: 211 LBS | HEIGHT: 71 IN | OXYGEN SATURATION: 96 % | SYSTOLIC BLOOD PRESSURE: 151 MMHG | HEART RATE: 86 BPM

## 2017-12-06 DIAGNOSIS — I10 ESSENTIAL HYPERTENSION: Primary | ICD-10-CM

## 2017-12-06 DIAGNOSIS — M77.9 BONE SPUR: ICD-10-CM

## 2017-12-06 DIAGNOSIS — G62.9 NEUROPATHY: ICD-10-CM

## 2017-12-06 DIAGNOSIS — J40 BRONCHITIS: ICD-10-CM

## 2017-12-06 DIAGNOSIS — E67.2 HYPERVITAMINOSIS B6: ICD-10-CM

## 2017-12-06 PROCEDURE — 99214 OFFICE O/P EST MOD 30 MIN: CPT | Performed by: INTERNAL MEDICINE

## 2017-12-06 RX ORDER — GREEN TEA/HOODIA GORDONII 315-12.5MG
CAPSULE ORAL DAILY
COMMUNITY
End: 2019-06-21

## 2017-12-06 RX ORDER — DEXTROMETHORPHAN HYDROBROMIDE AND PROMETHAZINE HYDROCHLORIDE 15; 6.25 MG/5ML; MG/5ML
5 SYRUP ORAL 4 TIMES DAILY PRN
Qty: 240 ML | Refills: 0 | Status: SHIPPED | OUTPATIENT
Start: 2017-12-06 | End: 2018-06-07

## 2017-12-06 RX ORDER — AMOXICILLIN AND CLAVULANATE POTASSIUM 875; 125 MG/1; MG/1
1 TABLET, FILM COATED ORAL 2 TIMES DAILY
Qty: 20 TABLET | Refills: 0 | Status: SHIPPED | OUTPATIENT
Start: 2017-12-06 | End: 2018-06-07

## 2017-12-06 RX ORDER — BICALUTAMIDE 50 MG/1
TABLET, FILM COATED ORAL DAILY
COMMUNITY
End: 2018-09-06

## 2017-12-06 RX ORDER — SENNOSIDES 8.6 MG
650 CAPSULE ORAL EVERY 8 HOURS PRN
COMMUNITY
End: 2019-06-21

## 2017-12-06 NOTE — PROGRESS NOTES
Subjective     Patient ID: Sukumar Cosby is a 83 y.o. male. Patient is here for management of multiple medical problems.     Chief Complaint   Patient presents with   • Hypertension     follow-up   • Gout     follow-up   • Cough     patient coughing up grayish green phlegm with blood in it x 2 weeks   • Bodyaches     patient having bodyaches   • Dizziness     continuing to have dizziness   • Bone Spur     patient needs referral   • medication refills     patient needs refills on Losartan and Sertraline     Hypertension   This is a chronic problem. The current episode started more than 1 year ago. The problem is unchanged. The problem is controlled. Pertinent negatives include no anxiety, blurred vision, chest pain or headaches. There are no associated agents to hypertension. There are no known risk factors for coronary artery disease. Past treatments include diuretics, calcium channel blockers and beta blockers.   Gout   Associated symptoms include coughing. Pertinent negatives include no chest pain or headaches.   Cough   This is a chronic problem. The current episode started more than 1 year ago. The problem has been waxing and waning. The cough is productive of sputum. Pertinent negatives include no chest pain or headaches. Nothing aggravates the symptoms. He has tried OTC cough suppressant for the symptoms. The treatment provided no relief. His past medical history is significant for asthma.   Dizziness   This is a new problem. The current episode started in the past 7 days. Associated symptoms include coughing. Pertinent negatives include no chest pain or headaches. Nothing aggravates the symptoms.        The following portions of the patient's history were reviewed and updated as appropriate: allergies, current medications, past family history, past medical history, past social history, past surgical history and problem list.    Review of Systems   Eyes: Negative for blurred vision.   Respiratory: Positive for  cough.    Cardiovascular: Negative for chest pain.   Musculoskeletal: Positive for gout.   Neurological: Positive for dizziness. Negative for headaches.       Current Outpatient Prescriptions:   •  acetaminophen (TYLENOL) 650 MG 8 hr tablet, Take 650 mg by mouth Every 8 (Eight) Hours As Needed for Mild Pain ., Disp: , Rfl:   •  allopurinol (ZYLOPRIM) 100 MG tablet, Take 1 tablet by mouth Daily., Disp: 30 tablet, Rfl: 11  •  amLODIPine (NORVASC) 5 MG tablet, Take 1 tablet by mouth Daily., Disp: 90 tablet, Rfl: 3  •  bicalutamide (CASODEX) 50 MG chemo tablet, Take  by mouth Daily., Disp: , Rfl:   •  carvedilol (COREG) 3.125 MG tablet, Take 1 tablet by mouth 2 (Two) Times a Day With Meals., Disp: 60 tablet, Rfl: 11  •  Elastic Bandages & Supports (ABDOMINAL BINDER/ELASTIC 2XL) misc, , Disp: , Rfl:   •  Fluticasone Furoate 100 MCG/ACT aerosol powder , Inhale 100 mcg Daily., Disp: 30 each, Rfl: 11  •  hydrochlorothiazide (MICROZIDE) 12.5 MG capsule, Take 1 capsule by mouth Daily., Disp: 90 capsule, Rfl: 3  •  losartan (COZAAR) 100 MG tablet, Take 1 tablet by mouth Daily., Disp: 90 tablet, Rfl: 1  •  Multiple Vitamins-Minerals (HAIR/SKIN/NAILS/BIOTIN) tablet, Take  by mouth Daily., Disp: , Rfl:   •  Multiple Vitamins-Minerals (OCUVITE ADULT 50+ PO), Take  by mouth., Disp: , Rfl:   •  pramipexole (MIRAPEX) 1 MG tablet, Take 1 tablet by mouth Every Night., Disp: 90 tablet, Rfl: 2  •  sertraline (ZOLOFT) 25 MG tablet, Take 1 tablet by mouth Daily., Disp: 90 tablet, Rfl: 3  •  Spacer/Aero Chamber Mouthpiece misc, 1 can 2 (Two) Times a Day., Disp: 1 each, Rfl: 0  •  albuterol (VENTOLIN HFA) 108 (90 BASE) MCG/ACT inhaler, Ventolin  (90 Base) MCG/ACT Inhalation Aerosol Solution; Patient Sig: Ventolin  (90 Base) MCG/ACT Inhalation Aerosol Solution ; 18; 0; 24-May-2013; Active, Disp: , Rfl:   •  amoxicillin-clavulanate (AUGMENTIN) 875-125 MG per tablet, Take 1 tablet by mouth 2 (Two) Times a Day., Disp: 20 tablet,  "Rfl: 0  •  promethazine-dextromethorphan (PROMETHAZINE-DM) 6.25-15 MG/5ML syrup, Take 5 mL by mouth 4 (Four) Times a Day As Needed for Cough., Disp: 240 mL, Rfl: 0  •  zoster vaccine live PF (ZOSTAVAX) 53485 UNT/0.65ML injection, Inject  under the skin., Disp: , Rfl:     Objective      Blood pressure 151/66, pulse 86, temperature 97.9 °F (36.6 °C), temperature source Oral, resp. rate 16, height 180.3 cm (71\"), weight 95.7 kg (211 lb), SpO2 96 %.    Physical Exam     General Appearance:    Alert, cooperative, no distress, appears stated age   Head:    Normocephalic, without obvious abnormality, atraumatic   Eyes:    PERRL, conjunctiva/corneas clear, EOM's intact   Ears:    Normal TM's and external ear canals, both ears   Nose:   Nares normal, septum midline, mucosa normal, no drainage   or sinus tenderness   Throat:   Lips, mucosa, and tongue normal; teeth and gums normal   Neck:   Supple, symmetrical, trachea midline, no adenopathy;        thyroid:  No enlargement/tenderness/nodules; no carotid    bruit or JVD   Back:     Symmetric, no curvature, ROM normal, no CVA tenderness   Lungs:     Course bronchial bs to auscultation bilaterally, respirations unlabored   Chest wall:    No tenderness or deformity   Heart:    Regular rate and rhythm, S1 and S2 normal, no murmur,        rub or gallop   Abdomen:     Soft, non-tender, bowel sounds active all four quadrants,     no masses, no organomegaly   Extremities:   Extremities normal, atraumatic, no cyanosis or edema   Pulses:   2+ and symmetric all extremities   Skin:   Skin color, texture, turgor normal, no rashes or lesions   Lymph nodes:   Cervical, supraclavicular, and axillary nodes normal   Neurologic:   CNII-XII intact. Normal strength, sensation and reflexes       throughout      Results for orders placed or performed in visit on 10/23/17   Vitamin B6   Result Value Ref Range    Vitamin B6 92.4 (H) 5.3 - 46.7 ug/L   Uric Acid   Result Value Ref Range    Uric Acid " 6.2 2.5 - 8.5 mg/dL   Comprehensive Metabolic Panel   Result Value Ref Range    Glucose 106 (H) 74 - 98 mg/dL    BUN 26 (H) 7 - 20 mg/dL    Creatinine 1.30 0.60 - 1.30 mg/dL    eGFR Non African Am 53 (L) >60 mL/min/1.73    eGFR African Am 64 >60 mL/min/1.73    BUN/Creatinine Ratio 20.0 6.3 - 21.9    Sodium 141 137 - 145 mmol/L    Potassium 4.7 3.5 - 5.1 mmol/L    Chloride 99 98 - 107 mmol/L    Total CO2 29.0 26.0 - 30.0 mmol/L    Calcium 10.1 8.4 - 10.2 mg/dL    Total Protein 7.1 6.3 - 8.2 g/dL    Albumin 4.40 3.50 - 5.00 g/dL    Globulin 2.7 gm/dL    A/G Ratio 1.6 1.0 - 2.0 g/dL    Total Bilirubin 0.4 0.2 - 1.3 mg/dL    Alkaline Phosphatase 75 38 - 126 U/L    AST (SGOT) 27 15 - 46 U/L    ALT (SGPT) 36 13 - 69 U/L   PSA   Result Value Ref Range    PSA 6.130 (H) 0.060 - 4.000 ng/mL   CBC & Differential   Result Value Ref Range    WBC 6.58 4.80 - 10.80 10*3/mm3    RBC 4.38 (L) 4.70 - 6.10 10*6/mm3    Hemoglobin 12.9 (L) 14.0 - 18.0 g/dL    Hematocrit 39.4 (L) 42.0 - 52.0 %    MCV 90.0 80.0 - 94.0 fL    MCH 29.5 27.0 - 31.0 pg    MCHC 32.7 30.0 - 37.0 g/dL    RDW 13.6 11.5 - 14.5 %    Platelets 200 130 - 400 10*3/mm3    Neutrophil Rel % 62.1 37.0 - 80.0 %    Lymphocyte Rel % 25.5 10.0 - 50.0 %    Monocyte Rel % 9.6 0.0 - 12.0 %    Eosinophil Rel % 1.5 0.0 - 7.0 %    Basophil Rel % 0.8 0.0 - 2.5 %    Neutrophils Absolute 4.09 2.00 - 6.90 10*3/mm3    Lymphocytes Absolute 1.68 0.60 - 3.40 10*3/mm3    Monocytes Absolute 0.63 0.00 - 0.90 10*3/mm3    Eosinophils Absolute 0.10 0.00 - 0.70 10*3/mm3    Basophils Absolute 0.05 0.00 - 0.20 10*3/mm3    Immature Granulocyte Rel % 0.5 0.0 - 0.6 %    Immature Grans Absolute 0.03 0.00 - 0.06 10*3/mm3    nRBC 0.0 0.0 - 0.0 /100 WBC         Assessment/Plan   Very high vit b6 level. Pt ibis stop supplement.   Pt with neuropathy of feet. Stopping the vit b6 and this may help.    minocycline didn't seem to help.         Ray was seen today for hypertension, gout, cough, bodyaches, dizziness,  bone spur and medication refills.    Diagnoses and all orders for this visit:    Essential hypertension  -     TSH  -     T4, Free  -     Vitamin B12  -     Comprehensive Metabolic Panel  -     CBC & Differential  -     Vitamin B6    Hypervitaminosis B6  -     TSH  -     T4, Free  -     Vitamin B12  -     Comprehensive Metabolic Panel  -     CBC & Differential  -     Vitamin B6    Neuropathy  -     TSH  -     T4, Free  -     Vitamin B12  -     Comprehensive Metabolic Panel  -     CBC & Differential  -     Vitamin B6    Bronchitis  -     amoxicillin-clavulanate (AUGMENTIN) 875-125 MG per tablet; Take 1 tablet by mouth 2 (Two) Times a Day.  -     promethazine-dextromethorphan (PROMETHAZINE-DM) 6.25-15 MG/5ML syrup; Take 5 mL by mouth 4 (Four) Times a Day As Needed for Cough.  -     TSH  -     T4, Free  -     Vitamin B12  -     Comprehensive Metabolic Panel  -     CBC & Differential  -     Vitamin B6      Return in about 6 months (around 6/6/2018).          There are no Patient Instructions on file for this visit.     Yobany Nolasco MD    Assessment/Plan

## 2017-12-20 DIAGNOSIS — M79.671 RIGHT FOOT PAIN: Primary | ICD-10-CM

## 2017-12-21 ENCOUNTER — OFFICE VISIT (OUTPATIENT)
Dept: ORTHOPEDIC SURGERY | Facility: CLINIC | Age: 82
End: 2017-12-21

## 2017-12-21 VITALS — BODY MASS INDEX: 29.54 KG/M2 | WEIGHT: 211 LBS | RESPIRATION RATE: 18 BRPM | HEIGHT: 71 IN

## 2017-12-21 DIAGNOSIS — M21.6X9 CAVUS FOOT, ACQUIRED: ICD-10-CM

## 2017-12-21 DIAGNOSIS — M79.671 RIGHT FOOT PAIN: Primary | ICD-10-CM

## 2017-12-21 DIAGNOSIS — L84 CALLUS OF FOOT: ICD-10-CM

## 2017-12-21 PROCEDURE — 11055 PARING/CUTG B9 HYPRKER LES 1: CPT | Performed by: PODIATRIST

## 2017-12-21 PROCEDURE — 99203 OFFICE O/P NEW LOW 30 MIN: CPT | Performed by: PODIATRIST

## 2017-12-21 RX ORDER — UREA 40 %
CREAM (GRAM) TOPICAL
Qty: 90 EACH | Refills: 3 | Status: SHIPPED | OUTPATIENT
Start: 2017-12-21 | End: 2018-09-06

## 2017-12-21 NOTE — PROGRESS NOTES
Subjective   Patient ID: Sukumar Cosby is a 83 y.o. male   Pain of the Right Foot and Consult (Is being seen today at the request of Dr. Yobany Nolasco MD )  Very pleasant 83-year-old nondiabetic male presents with a painful area on the bottom and outside of his right foot.  States it's been that way for quite some time.  Says he has back issues and pains and complains of symptoms that might be somewhat resembling neuropathy.  He's still very active and still works full time.  He says he's always only go and moving.  He says walking on the lesion in his shoes is quite painful but getting off of it and nonweightbearing does help alleviate some of the aching pain.  He's had no previous treatment for this.  He states that given his line of work he is wondered if he stepped on a piece of sheet metal or something in his foot.     History of Present Illness    Pain Score: worst possible pain  Pain Location: Foot  Pain Orientation: Right     Pain Descriptors: Aching  Pain Frequency: Intermittent           Aggravating Factors: Walking, Standing        Pain Intervention(s): Rest  Result of Injury: No       Review of Systems   Constitutional: Negative for diaphoresis, fever and unexpected weight change.   HENT: Negative for dental problem and sore throat.    Eyes: Negative for visual disturbance.   Respiratory: Negative for shortness of breath.    Cardiovascular: Negative for chest pain.   Gastrointestinal: Negative for abdominal pain, constipation, diarrhea, nausea and vomiting.   Genitourinary: Negative for difficulty urinating and frequency.   Neurological: Negative for headaches.   Hematological: Does not bruise/bleed easily.   All other systems reviewed and are negative.      Past Medical History:   Diagnosis Date   • Arthritis    • Fluid in knee    • GERD (gastroesophageal reflux disease)    • History of anemia    • History of blood transfusion    • History of bronchitis    • History of colonic polyps    • History of  fibromyalgia    • History of gallstones    • Hypertension    • Prostate cancer    • Sinusitis         Past Surgical History:   Procedure Laterality Date   • CHOLECYSTECTOMY  2005   • COLONOSCOPY  2013   • UPPER GASTROINTESTINAL ENDOSCOPY  2013       No Known Allergies    Family History   Problem Relation Age of Onset   • Cancer Mother    • Migraines Mother    • Osteoporosis Mother        Social History     Social History   • Marital status:      Spouse name: N/A   • Number of children: N/A   • Years of education: N/A     Occupational History   • Not on file.     Social History Main Topics   • Smoking status: Never Smoker   • Smokeless tobacco: Never Used   • Alcohol use No   • Drug use: No   • Sexual activity: Not on file     Other Topics Concern   • Not on file     Social History Narrative       Counseling given: Not Answered       I have reviewed all of the above social hx, family hx, surgical hx, medications, allergies & ROS and confirm that it is accurate.  Objective   Physical Exam   Constitutional: He is oriented to person, place, and time. He appears well-developed and well-nourished.   HENT:   Head: Normocephalic and atraumatic.   Nose: Nose normal.   Eyes: Conjunctivae and EOM are normal. Pupils are equal, round, and reactive to light.   Neck: Normal range of motion.   Cardiovascular: Normal rate.    Pulmonary/Chest: Effort normal.   Abdominal: Soft.   Neurological: He is alert and oriented to person, place, and time.   Skin: Skin is warm.   Psychiatric: He has a normal mood and affect. His behavior is normal. Judgment and thought content normal.   Nursing note reviewed.    Ortho Exam  Ortho Exam  Lower extremity exam:  Vascular: Pulses are palpable, no pedal hair noted, capillary refill time was 5 seconds, no edema noted  Neuro: Gross sensations intact, protective sensation seems diminished  Derm: There is some erythema and edema and swelling to the plantar and lateral aspect of the right fifth  MTPJ.  There is a very thickened multicolored hyperkeratotic lesion plantarly under the joint and with debridement of this seems almost be a ulceration.  It is not quite full thickness.  I cannot appreciate any drainage or foreign body or substance.  MSK: There are flexible digital contractures of digits 2 through 5 of the right foot.  He maintains a medial longitudinal arch upon weightbearing.  The metatarsal heads are very prominent plantarly.  Fat pad atrophy is noted.    Assessment/Plan cavus foot, probable lower extremity neuropathy, right submet 5 lesion, hammertoes 2 through 5  Independent Review of Radiographic Studies:      Laboratory and Other Studies:     Medical Decision Making:        Procedures with a 15 blade I removed an exuberant callus area and debrided the hyperkeratotic tissue and lesion.  This was then dressed and padded.  [] No procedures were performed in office today.    Ray was seen today for consult and pain.    Diagnoses and all orders for this visit:    Right foot pain    Callus of foot    Cavus foot, acquired    Other orders  -     urea (CARMOL) 40 % cream; Apply  topically Daily.        Recommendations/Plan: I debrided the lesion and padded this.  I will prescribe him urea.  I recommended he try a pair of power step inserts.  I explained ideally a pair of custom orthotics to offload the area would be beneficial but this would be an out-of-pocket expense.  I'm hopeful with some power steps and may be local offloading.  I do want to keep very close eye on this in case there is underlying foreign body or infection developing. call Me with any questions or concerns.  Follow-up to 3 weeks.  Return in about 3 weeks (around 1/11/2018).  Patient agreeable to call or return sooner for any concerns.

## 2018-01-11 ENCOUNTER — OFFICE VISIT (OUTPATIENT)
Dept: ORTHOPEDIC SURGERY | Facility: CLINIC | Age: 83
End: 2018-01-11

## 2018-01-11 VITALS — HEIGHT: 71 IN | BODY MASS INDEX: 29.4 KG/M2 | WEIGHT: 210 LBS | RESPIRATION RATE: 18 BRPM

## 2018-01-11 DIAGNOSIS — M79.671 RIGHT FOOT PAIN: ICD-10-CM

## 2018-01-11 DIAGNOSIS — L84 CALLUS OF FOOT: Primary | ICD-10-CM

## 2018-01-11 DIAGNOSIS — M21.6X9 CAVUS FOOT, ACQUIRED: ICD-10-CM

## 2018-01-11 DIAGNOSIS — M21.621 TAILOR'S BUNION OF RIGHT FOOT: ICD-10-CM

## 2018-01-11 PROCEDURE — 99213 OFFICE O/P EST LOW 20 MIN: CPT | Performed by: PODIATRIST

## 2018-01-11 PROCEDURE — 11055 PARING/CUTG B9 HYPRKER LES 1: CPT | Performed by: PODIATRIST

## 2018-01-11 NOTE — PROGRESS NOTES
Subjective   Patient ID: Sukumar Cosby is a 83 y.o. male   Follow-up of the Right Foot  Returns today for his right foot.  Says that it is feeling better and doing better.  He says that the urea prescription was expensive but his pharmacist gave him something similar so he's been using that and thinks it helps.  He's questioning me about gout and wants to know about it.  He said he thinks that maybe there is some foods or things that he eats her does that make the outside of his foot hurt at times.    History of Present Illness    Pain Score: 3  Pain Location: Foot  Pain Orientation: Right     Pain Descriptors: Aching  Pain Frequency: Intermittent        Clinical Progression: Gradually improving  Aggravating Factors: Walking, Standing        Pain Intervention(s): Rest  Result of Injury: No  Work-Related Injury: No    Review of Systems   Constitutional: Negative for diaphoresis, fever and unexpected weight change.   HENT: Negative for dental problem and sore throat.    Eyes: Negative for visual disturbance.   Respiratory: Negative for shortness of breath.    Cardiovascular: Negative for chest pain.   Gastrointestinal: Negative for abdominal pain, constipation, diarrhea, nausea and vomiting.   Genitourinary: Negative for difficulty urinating and frequency.   Musculoskeletal: Positive for arthralgias.   Neurological: Negative for headaches.   Hematological: Does not bruise/bleed easily.       Past Medical History:   Diagnosis Date   • Arthritis    • Fluid in knee    • GERD (gastroesophageal reflux disease)    • History of anemia    • History of blood transfusion    • History of bronchitis    • History of colonic polyps    • History of fibromyalgia    • History of gallstones    • Hypertension    • Prostate cancer    • Sinusitis         Past Surgical History:   Procedure Laterality Date   • CHOLECYSTECTOMY  2005   • COLONOSCOPY  2013   • UPPER GASTROINTESTINAL ENDOSCOPY  2013       No Known Allergies    Family History    Problem Relation Age of Onset   • Cancer Mother    • Migraines Mother    • Osteoporosis Mother        Social History     Social History   • Marital status:      Spouse name: N/A   • Number of children: N/A   • Years of education: N/A     Occupational History   • Not on file.     Social History Main Topics   • Smoking status: Never Smoker   • Smokeless tobacco: Never Used   • Alcohol use No   • Drug use: No   • Sexual activity: Not on file     Other Topics Concern   • Not on file     Social History Narrative       Counseling given: Not Answered       I have reviewed all of the above social hx, family hx, surgical hx, medications, allergies & ROS and confirm that it is accurate.  Objective   Physical Exam   Constitutional: He is oriented to person, place, and time. He appears well-developed and well-nourished.   HENT:   Head: Normocephalic and atraumatic.   Nose: Nose normal.   Eyes: Conjunctivae and EOM are normal. Pupils are equal, round, and reactive to light.   Neck: Normal range of motion.   Pulmonary/Chest: Effort normal.   Abdominal: Soft.   Neurological: He is alert and oriented to person, place, and time.   Skin: Skin is warm.   Psychiatric: He has a normal mood and affect. His behavior is normal. Judgment and thought content normal.   Nursing note reviewed.    Ortho Exam  Ortho Exam  Lower extremity exam:  Vascular: Pulses are palpable, no pedal hair noted, capillary refill time was 5 seconds, no edema noted  Neuro: Gross sensations intact, protective sensation seems diminished  Derm: There is some erythema and edema and swelling to the plantar and lateral aspect of the right fifth MTPJ.  There is a very thickened multicolored hyperkeratotic lesion plantarly under the joint and with debridement of this seems almost be a ulceration.  It is not quite full thickness.  I cannot appreciate any drainage or foreign body or substance.  MSK: There are flexible digital contractures of digits 2 through 5 of  the right foot.  He maintains a medial longitudinal arch upon weightbearing.  The metatarsal heads are very prominent plantarly.  Fat pad atrophy is noted    Assessment/Plan right cavus foot, tailor's bunion, hyperkeratotic lesion sub-met 5  Independent Review of Radiographic Studies:      Laboratory and Other Studies:     Medical Decision Making:        Procedures  [] No procedures were performed in office today.    Ray was seen today for follow-up.    Diagnoses and all orders for this visit:    Callus of foot    Right foot pain    Cavus foot, acquired    Tailor's bunion of right foot        Recommendations/Plan:  With a 15 blade I removed and debrided the core portion of this lesion that was blackened and it seems to show normal healthy skin underneath.  I recommend he continue with the cream and softening agents as well as padding.  I discussed the pathology and pathogenesis of gout and explained that most likely this is more related to bursitis and inflammation of his tailor's bunion and it is gout but I did give him some diet tips to see if that helps as well.  I also discussed using oral and topical anti-inflammatories when it flares up.  If he needs anything further or this lesion continues to bother him I will let him follow-up on a when necessary basis for now.    Return if symptoms worsen or fail to improve.  Patient agreeable to call or return sooner for any concerns.

## 2018-01-31 DIAGNOSIS — I10 ESSENTIAL HYPERTENSION: ICD-10-CM

## 2018-01-31 RX ORDER — LOSARTAN POTASSIUM 100 MG/1
TABLET ORAL
Qty: 90 TABLET | Refills: 1 | Status: SHIPPED | OUTPATIENT
Start: 2018-01-31 | End: 2019-01-21

## 2018-06-07 ENCOUNTER — HOSPITAL ENCOUNTER (OUTPATIENT)
Dept: GENERAL RADIOLOGY | Facility: HOSPITAL | Age: 83
Discharge: HOME | End: 2018-06-07
Admitting: INTERNAL MEDICINE

## 2018-06-07 ENCOUNTER — OFFICE VISIT (OUTPATIENT)
Dept: INTERNAL MEDICINE | Facility: CLINIC | Age: 83
End: 2018-06-07

## 2018-06-07 VITALS
HEIGHT: 71 IN | DIASTOLIC BLOOD PRESSURE: 68 MMHG | RESPIRATION RATE: 16 BRPM | HEART RATE: 75 BPM | OXYGEN SATURATION: 98 % | WEIGHT: 205 LBS | TEMPERATURE: 97.6 F | BODY MASS INDEX: 28.7 KG/M2 | SYSTOLIC BLOOD PRESSURE: 169 MMHG

## 2018-06-07 DIAGNOSIS — R53.1 WEAKNESS: ICD-10-CM

## 2018-06-07 DIAGNOSIS — I10 ESSENTIAL HYPERTENSION: Primary | ICD-10-CM

## 2018-06-07 DIAGNOSIS — C61 PROSTATE CANCER (HCC): ICD-10-CM

## 2018-06-07 DIAGNOSIS — Z12.5 PROSTATE CANCER SCREENING: ICD-10-CM

## 2018-06-07 DIAGNOSIS — H81.13 BENIGN PAROXYSMAL POSITIONAL VERTIGO DUE TO BILATERAL VESTIBULAR DISORDER: ICD-10-CM

## 2018-06-07 DIAGNOSIS — M79.605 LEG PAIN, BILATERAL: ICD-10-CM

## 2018-06-07 DIAGNOSIS — M60.9 MYOSITIS, UNSPECIFIED MYOSITIS TYPE, UNSPECIFIED SITE: ICD-10-CM

## 2018-06-07 DIAGNOSIS — E55.9 VITAMIN D DEFICIENCY: ICD-10-CM

## 2018-06-07 DIAGNOSIS — M79.604 LEG PAIN, BILATERAL: ICD-10-CM

## 2018-06-07 DIAGNOSIS — A79.9 RICKETTSIOSES: ICD-10-CM

## 2018-06-07 LAB
25(OH)D3+25(OH)D2 SERPL-MCNC: 34.6 NG/ML
ALBUMIN SERPL-MCNC: 4.2 G/DL (ref 3.5–5)
ALBUMIN/GLOB SERPL: 1.5 G/DL (ref 1–2)
ALP SERPL-CCNC: 83 U/L (ref 38–126)
ALT SERPL-CCNC: 41 U/L (ref 13–69)
AST SERPL-CCNC: 35 U/L (ref 15–46)
BASOPHILS # BLD AUTO: 0.03 10*3/MM3 (ref 0–0.2)
BASOPHILS NFR BLD AUTO: 0.4 % (ref 0–2.5)
BILIRUB SERPL-MCNC: 0.3 MG/DL (ref 0.2–1.3)
BUN SERPL-MCNC: 26 MG/DL (ref 7–20)
BUN/CREAT SERPL: 26 (ref 6.3–21.9)
CALCIUM SERPL-MCNC: 9.7 MG/DL (ref 8.4–10.2)
CHLORIDE SERPL-SCNC: 101 MMOL/L (ref 98–107)
CO2 SERPL-SCNC: 29 MMOL/L (ref 26–30)
CREAT SERPL-MCNC: 1 MG/DL (ref 0.6–1.3)
EOSINOPHIL # BLD AUTO: 0.21 10*3/MM3 (ref 0–0.7)
EOSINOPHIL NFR BLD AUTO: 3.1 % (ref 0–7)
ERYTHROCYTE [DISTWIDTH] IN BLOOD BY AUTOMATED COUNT: 13.4 % (ref 11.5–14.5)
GFR SERPLBLD CREATININE-BSD FMLA CKD-EPI: 71 ML/MIN/1.73
GFR SERPLBLD CREATININE-BSD FMLA CKD-EPI: 86 ML/MIN/1.73
GLOBULIN SER CALC-MCNC: 2.8 GM/DL
GLUCOSE SERPL-MCNC: 102 MG/DL (ref 74–98)
HCT VFR BLD AUTO: 37.5 % (ref 42–52)
HGB BLD-MCNC: 12.6 G/DL (ref 14–18)
IMM GRANULOCYTES # BLD: 0.01 10*3/MM3 (ref 0–0.06)
IMM GRANULOCYTES NFR BLD: 0.1 % (ref 0–0.6)
LYMPHOCYTES # BLD AUTO: 1.68 10*3/MM3 (ref 0.6–3.4)
LYMPHOCYTES NFR BLD AUTO: 25 % (ref 10–50)
MCH RBC QN AUTO: 29.9 PG (ref 27–31)
MCHC RBC AUTO-ENTMCNC: 33.6 G/DL (ref 30–37)
MCV RBC AUTO: 88.9 FL (ref 80–94)
MONOCYTES # BLD AUTO: 0.65 10*3/MM3 (ref 0–0.9)
MONOCYTES NFR BLD AUTO: 9.7 % (ref 0–12)
NEUTROPHILS # BLD AUTO: 4.15 10*3/MM3 (ref 2–6.9)
NEUTROPHILS NFR BLD AUTO: 61.7 % (ref 37–80)
NRBC BLD AUTO-RTO: 0 /100 WBC (ref 0–0)
PLATELET # BLD AUTO: 176 10*3/MM3 (ref 130–400)
POTASSIUM SERPL-SCNC: 4.4 MMOL/L (ref 3.5–5.1)
PROT SERPL-MCNC: 7 G/DL (ref 6.3–8.2)
PSA SERPL-MCNC: 0.36 NG/ML (ref 0.06–4)
RBC # BLD AUTO: 4.22 10*6/MM3 (ref 4.7–6.1)
SODIUM SERPL-SCNC: 142 MMOL/L (ref 137–145)
T4 FREE SERPL-MCNC: 0.9 NG/DL (ref 0.78–2.19)
TSH SERPL DL<=0.005 MIU/L-ACNC: 1.15 MIU/ML (ref 0.47–4.68)
VIT B12 SERPL-MCNC: 467 PG/ML (ref 239–931)
WBC # BLD AUTO: 6.73 10*3/MM3 (ref 4.8–10.8)

## 2018-06-07 PROCEDURE — 73521 X-RAY EXAM HIPS BI 2 VIEWS: CPT

## 2018-06-07 PROCEDURE — 99214 OFFICE O/P EST MOD 30 MIN: CPT | Performed by: INTERNAL MEDICINE

## 2018-06-07 PROCEDURE — 73552 X-RAY EXAM OF FEMUR 2/>: CPT

## 2018-06-07 NOTE — PROGRESS NOTES
Subjective     Patient ID: Sukumar Cosby is a 84 y.o. male. Patient is here for management of multiple medical problems.     Chief Complaint   Patient presents with   • Hypertension     6 month follow-up   • Dizziness     patient having increased dizziness   • Pain     patient having bilateral leg pain   • Pain     patient having left testicle x 2 months     Hypertension   This is a chronic problem. The current episode started 1 to 4 weeks ago. The problem is unchanged. The problem is uncontrolled. Past treatments include ACE inhibitors and calcium channel blockers. Current antihypertension treatment includes ACE inhibitors and calcium channel blockers. The current treatment provides moderate improvement. There are no compliance problems.  There is no history of kidney disease or CAD/MI.   Dizziness   This is a chronic problem. The current episode started more than 1 year ago. The problem occurs constantly. The problem has been waxing and waning. Associated symptoms include fatigue, myalgias, vertigo and weakness. Pertinent negatives include no abdominal pain, fever, joint swelling or visual change. The treatment provided moderate relief.   Pain   This is a chronic problem. The current episode started more than 1 month ago. The problem occurs constantly. Associated symptoms include fatigue, myalgias, vertigo and weakness. Pertinent negatives include no abdominal pain, fever, joint swelling or visual change. Nothing aggravates the symptoms. The treatment provided no relief.        The following portions of the patient's history were reviewed and updated as appropriate: allergies, current medications, past family history, past medical history, past social history, past surgical history and problem list.    Review of Systems   Constitutional: Positive for fatigue. Negative for fever.   Gastrointestinal: Negative for abdominal pain.   Musculoskeletal: Positive for myalgias. Negative for joint swelling.   Neurological:  "Positive for dizziness, vertigo and weakness.       Current Outpatient Prescriptions:   •  acetaminophen (TYLENOL) 650 MG 8 hr tablet, Take 650 mg by mouth Every 8 (Eight) Hours As Needed for Mild Pain ., Disp: , Rfl:   •  albuterol (VENTOLIN HFA) 108 (90 BASE) MCG/ACT inhaler, Ventolin  (90 Base) MCG/ACT Inhalation Aerosol Solution; Patient Sig: Ventolin  (90 Base) MCG/ACT Inhalation Aerosol Solution ; 18; 0; 24-May-2013; Active, Disp: , Rfl:   •  allopurinol (ZYLOPRIM) 100 MG tablet, Take 1 tablet by mouth Daily., Disp: 30 tablet, Rfl: 11  •  amLODIPine (NORVASC) 5 MG tablet, Take 1 tablet by mouth Daily., Disp: 90 tablet, Rfl: 3  •  bicalutamide (CASODEX) 50 MG chemo tablet, Take  by mouth Daily., Disp: , Rfl:   •  carvedilol (COREG) 3.125 MG tablet, Take 1 tablet by mouth 2 (Two) Times a Day With Meals., Disp: 60 tablet, Rfl: 11  •  Fluticasone Furoate 100 MCG/ACT aerosol powder , Inhale 100 mcg Daily., Disp: 30 each, Rfl: 11  •  hydrochlorothiazide (MICROZIDE) 12.5 MG capsule, Take 1 capsule by mouth Daily., Disp: 90 capsule, Rfl: 3  •  losartan (COZAAR) 100 MG tablet, TAKE ONE TABLET BY MOUTH EVERY DAY, Disp: 90 tablet, Rfl: 1  •  Multiple Vitamins-Minerals (HAIR/SKIN/NAILS/BIOTIN) tablet, Take  by mouth Daily., Disp: , Rfl:   •  Multiple Vitamins-Minerals (OCUVITE ADULT 50+ PO), Take  by mouth., Disp: , Rfl:   •  pramipexole (MIRAPEX) 1 MG tablet, Take 1 tablet by mouth Every Night., Disp: 90 tablet, Rfl: 2  •  sertraline (ZOLOFT) 25 MG tablet, Take 1 tablet by mouth Daily., Disp: 90 tablet, Rfl: 3  •  urea (CARMOL) 40 % cream, Apply  topically Daily., Disp: 90 each, Rfl: 3    Objective      Blood pressure 169/68, pulse 75, temperature 97.6 °F (36.4 °C), temperature source Oral, resp. rate 16, height 180.3 cm (71\"), weight 93 kg (205 lb), SpO2 98 %.    Physical Exam     General Appearance:    Alert, cooperative, no distress, appears stated age   Head:    Normocephalic, without obvious " abnormality, atraumatic   Eyes:    PERRL, conjunctiva/corneas clear, EOM's intact   Ears:    Normal TM's and external ear canals, both ears   Nose:   Nares normal, septum midline, mucosa normal, no drainage   or sinus tenderness   Throat:   Lips, mucosa, and tongue normal; teeth and gums normal   Neck:   Supple, symmetrical, trachea midline, no adenopathy;        thyroid:  No enlargement/tenderness/nodules; no carotid    bruit or JVD   Back:     Symmetric, no curvature, ROM normal, no CVA tenderness   Lungs:     Clear to auscultation bilaterally, respirations unlabored   Chest wall:    No tenderness or deformity   Heart:    Regular rate and rhythm, S1 and S2 normal, no murmur,        rub or gallop   Abdomen:     Soft, non-tender, bowel sounds active all four quadrants,     no masses, no organomegaly   Extremities:   Extremities normal, atraumatic, no cyanosis or edema   Pulses:   2+ and symmetric all extremities   Skin:   Skin color, texture, turgor normal, no rashes or lesions   Lymph nodes:   Cervical, supraclavicular, and axillary nodes normal   Neurologic:   CNII-XII intact. Normal strength, sensation and reflexes       throughout      Results for orders placed or performed in visit on 10/23/17   Vitamin B6   Result Value Ref Range    Vitamin B6 92.4 (H) 5.3 - 46.7 ug/L   Uric Acid   Result Value Ref Range    Uric Acid 6.2 2.5 - 8.5 mg/dL   Comprehensive Metabolic Panel   Result Value Ref Range    Glucose 106 (H) 74 - 98 mg/dL    BUN 26 (H) 7 - 20 mg/dL    Creatinine 1.30 0.60 - 1.30 mg/dL    eGFR Non African Am 53 (L) >60 mL/min/1.73    eGFR African Am 64 >60 mL/min/1.73    BUN/Creatinine Ratio 20.0 6.3 - 21.9    Sodium 141 137 - 145 mmol/L    Potassium 4.7 3.5 - 5.1 mmol/L    Chloride 99 98 - 107 mmol/L    Total CO2 29.0 26.0 - 30.0 mmol/L    Calcium 10.1 8.4 - 10.2 mg/dL    Total Protein 7.1 6.3 - 8.2 g/dL    Albumin 4.40 3.50 - 5.00 g/dL    Globulin 2.7 gm/dL    A/G Ratio 1.6 1.0 - 2.0 g/dL    Total  Bilirubin 0.4 0.2 - 1.3 mg/dL    Alkaline Phosphatase 75 38 - 126 U/L    AST (SGOT) 27 15 - 46 U/L    ALT (SGPT) 36 13 - 69 U/L   PSA   Result Value Ref Range    PSA 6.130 (H) 0.060 - 4.000 ng/mL   CBC & Differential   Result Value Ref Range    WBC 6.58 4.80 - 10.80 10*3/mm3    RBC 4.38 (L) 4.70 - 6.10 10*6/mm3    Hemoglobin 12.9 (L) 14.0 - 18.0 g/dL    Hematocrit 39.4 (L) 42.0 - 52.0 %    MCV 90.0 80.0 - 94.0 fL    MCH 29.5 27.0 - 31.0 pg    MCHC 32.7 30.0 - 37.0 g/dL    RDW 13.6 11.5 - 14.5 %    Platelets 200 130 - 400 10*3/mm3    Neutrophil Rel % 62.1 37.0 - 80.0 %    Lymphocyte Rel % 25.5 10.0 - 50.0 %    Monocyte Rel % 9.6 0.0 - 12.0 %    Eosinophil Rel % 1.5 0.0 - 7.0 %    Basophil Rel % 0.8 0.0 - 2.5 %    Neutrophils Absolute 4.09 2.00 - 6.90 10*3/mm3    Lymphocytes Absolute 1.68 0.60 - 3.40 10*3/mm3    Monocytes Absolute 0.63 0.00 - 0.90 10*3/mm3    Eosinophils Absolute 0.10 0.00 - 0.70 10*3/mm3    Basophils Absolute 0.05 0.00 - 0.20 10*3/mm3    Immature Granulocyte Rel % 0.5 0.0 - 0.6 %    Immature Grans Absolute 0.03 0.00 - 0.06 10*3/mm3    nRBC 0.0 0.0 - 0.0 /100 WBC         Assessment/Plan       Ray was seen today for hypertension, dizziness, pain and pain.    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          There are no Patient Instructions on file for this visit.     Yobany Nolasco MD    Assessment/Plan

## 2018-06-09 LAB
MYOGLOBIN SERPL-MCNC: 62.4 NG/ML (ref 0–101)
R RICKETTSI IGG SER QL IA: NEGATIVE
R RICKETTSI IGM SER-ACNC: 0.23 INDEX (ref 0–0.89)

## 2018-06-12 RX ORDER — DOXYCYCLINE 100 MG/1
100 CAPSULE ORAL EVERY 12 HOURS SCHEDULED
Qty: 60 CAPSULE | Refills: 0 | Status: SHIPPED | OUTPATIENT
Start: 2018-06-12 | End: 2018-08-08

## 2018-06-12 RX ORDER — MECLIZINE HCL 25MG 25 MG/1
25 TABLET, CHEWABLE ORAL EVERY 8 HOURS SCHEDULED
Qty: 30 TABLET | Refills: 4 | Status: SHIPPED | OUTPATIENT
Start: 2018-06-12 | End: 2018-12-10 | Stop reason: SDUPTHER

## 2018-08-08 ENCOUNTER — OFFICE VISIT (OUTPATIENT)
Dept: INTERNAL MEDICINE | Facility: CLINIC | Age: 83
End: 2018-08-08

## 2018-08-08 VITALS
HEIGHT: 71 IN | RESPIRATION RATE: 16 BRPM | HEART RATE: 71 BPM | OXYGEN SATURATION: 98 % | SYSTOLIC BLOOD PRESSURE: 181 MMHG | WEIGHT: 200 LBS | DIASTOLIC BLOOD PRESSURE: 64 MMHG | TEMPERATURE: 97.9 F | BODY MASS INDEX: 28 KG/M2

## 2018-08-08 DIAGNOSIS — R26.0 ATAXIC GAIT: ICD-10-CM

## 2018-08-08 DIAGNOSIS — I10 ESSENTIAL HYPERTENSION: Primary | ICD-10-CM

## 2018-08-08 DIAGNOSIS — R53.83 FATIGUE, UNSPECIFIED TYPE: ICD-10-CM

## 2018-08-08 DIAGNOSIS — M19.90 ARTHRITIS: ICD-10-CM

## 2018-08-08 DIAGNOSIS — R26.89 LOSS OF BALANCE: ICD-10-CM

## 2018-08-08 DIAGNOSIS — M33.20 POLYMYOSITIS (HCC): ICD-10-CM

## 2018-08-08 DIAGNOSIS — R68.81 EARLY SATIETY: ICD-10-CM

## 2018-08-08 DIAGNOSIS — R61 NIGHT SWEATS: ICD-10-CM

## 2018-08-08 DIAGNOSIS — R53.1 WEAKNESS: ICD-10-CM

## 2018-08-08 DIAGNOSIS — R22.1 MASS OF LEFT SIDE OF NECK: ICD-10-CM

## 2018-08-08 DIAGNOSIS — H55.00 NYSTAGMUS: ICD-10-CM

## 2018-08-08 PROCEDURE — 99214 OFFICE O/P EST MOD 30 MIN: CPT | Performed by: INTERNAL MEDICINE

## 2018-08-08 RX ORDER — MINOCYCLINE HYDROCHLORIDE 100 MG/1
100 CAPSULE ORAL EVERY 12 HOURS SCHEDULED
Qty: 28 CAPSULE | Refills: 0 | Status: SHIPPED | OUTPATIENT
Start: 2018-08-08 | End: 2018-09-06

## 2018-08-08 NOTE — PROGRESS NOTES
Subjective     Patient ID: Sukumar Cosby is a 84 y.o. male. Patient is here for management of multiple medical problems.     Chief Complaint   Patient presents with   • Hypertension     follow-up,    • Fatigue     patient states he feels tired all the time, he has been having excessive sweating, mainly at night      History of Present Illness     Waking with night sweats. Really weak.  Mental fogginess.    Tired all the time.    Wt loss 11 lbs in 8 month    Early satiety    Wake up tired.  Hard to walk.  Scattered though.  Balance getting worse.    multiple insect bites. Live in [facility]. Hx treated RMSF.        The following portions of the patient's history were reviewed and updated as appropriate: allergies, current medications, past family history, past medical history, past social history, past surgical history and problem list.    Review of Systems   Constitutional: Positive for fatigue.   HENT: Positive for congestion, postnasal drip and rhinorrhea.    Respiratory: Positive for shortness of breath.    Gastrointestinal: Positive for abdominal distention.   Musculoskeletal: Positive for arthralgias, joint swelling and myalgias.   Psychiatric/Behavioral: Positive for sleep disturbance.   All other systems reviewed and are negative.      Current Outpatient Prescriptions:   •  acetaminophen (TYLENOL) 650 MG 8 hr tablet, Take 650 mg by mouth Every 8 (Eight) Hours As Needed for Mild Pain ., Disp: , Rfl:   •  albuterol (VENTOLIN HFA) 108 (90 BASE) MCG/ACT inhaler, Ventolin  (90 Base) MCG/ACT Inhalation Aerosol Solution; Patient Sig: Ventolin  (90 Base) MCG/ACT Inhalation Aerosol Solution ; 18; 0; 24-May-2013; Active, Disp: , Rfl:   •  allopurinol (ZYLOPRIM) 100 MG tablet, Take 1 tablet by mouth Daily., Disp: 30 tablet, Rfl: 11  •  amLODIPine (NORVASC) 5 MG tablet, Take 1 tablet by mouth Daily., Disp: 90 tablet, Rfl: 3  •  bicalutamide (CASODEX) 50 MG chemo tablet, Take  by mouth Daily., Disp: , Rfl:   •   "carvedilol (COREG) 3.125 MG tablet, Take 1 tablet by mouth 2 (Two) Times a Day With Meals., Disp: 60 tablet, Rfl: 11  •  Fluticasone Furoate 100 MCG/ACT aerosol powder , Inhale 100 mcg Daily., Disp: 30 each, Rfl: 11  •  hydrochlorothiazide (MICROZIDE) 12.5 MG capsule, Take 1 capsule by mouth Daily., Disp: 90 capsule, Rfl: 3  •  losartan (COZAAR) 100 MG tablet, TAKE ONE TABLET BY MOUTH EVERY DAY, Disp: 90 tablet, Rfl: 1  •  meclizine 25 MG chewable tablet chewable tablet, Chew 1 tablet Every 8 (Eight) Hours., Disp: 30 tablet, Rfl: 4  •  Multiple Vitamins-Minerals (HAIR/SKIN/NAILS/BIOTIN) tablet, Take  by mouth Daily., Disp: , Rfl:   •  Multiple Vitamins-Minerals (OCUVITE ADULT 50+ PO), Take  by mouth., Disp: , Rfl:   •  pramipexole (MIRAPEX) 1 MG tablet, Take 1 tablet by mouth Every Night., Disp: 90 tablet, Rfl: 2  •  sertraline (ZOLOFT) 25 MG tablet, Take 1 tablet by mouth Daily., Disp: 90 tablet, Rfl: 3  •  urea (CARMOL) 40 % cream, Apply  topically Daily., Disp: 90 each, Rfl: 3  •  minocycline (MINOCIN) 100 MG capsule, Take 1 capsule by mouth Every 12 (Twelve) Hours., Disp: 28 capsule, Rfl: 0    Objective      Blood pressure (!) 181/64, pulse 71, temperature 97.9 °F (36.6 °C), temperature source Oral, resp. rate 16, height 180.3 cm (71\"), weight 90.7 kg (200 lb), SpO2 98 %.    Physical Exam     General Appearance:    Alert, cooperative, no distress, appears stated age   Head:    Normocephalic, without obvious abnormality, atraumatic   Eyes:    Now with new nystagmus. No ha.    PERRL, conjunctiva/corneas clear, EOM's intact   Ears:    Normal TM's and external ear canals, both ears   Nose:   Nares normal, septum midline, mucosa normal, no drainage   or sinus tenderness   Throat:   Lips, mucosa, and tongue normal; teeth and gums normal   Neck:   Supple, symmetrical, trachea midline, no adenopathy;        thyroid:  No enlargement/tenderness/nodules; no carotid    bruit or JVD   Back:     Symmetric, no curvature, ROM " normal, no CVA tenderness   Lungs:     Clear to auscultation bilaterally, respirations unlabored   Chest wall:    No tenderness or deformity   Heart:    Regular rate and rhythm, S1 and S2 normal, no murmur,        rub or gallop   Abdomen:     Soft, non-tender, bowel sounds active all four quadrants,     no masses, no organomegaly   Extremities:   Extremities normal, atraumatic, no cyanosis or edema   Pulses:   2+ and symmetric all extremities   Skin:   Skin color, texture, turgor normal, no rashes or lesions   Lymph nodes:   Cervical, supraclavicular, and axillary nodes normal   Neurologic:   CNII-XII intact. Normal strength, sensation and reflexes       throughout      Results for orders placed or performed in visit on 06/07/18   TSH   Result Value Ref Range    TSH 1.150 0.470 - 4.680 mIU/mL   T4, Free   Result Value Ref Range    Free T4 0.90 0.78 - 2.19 ng/dL   Vitamin B12   Result Value Ref Range    Vitamin B-12 467 239 - 931 pg/mL   Vitamin D 25 Hydroxy   Result Value Ref Range    25 Hydroxy, Vitamin D 34.6 ng/ml   Comprehensive Metabolic Panel   Result Value Ref Range    Glucose 102 (H) 74 - 98 mg/dL    BUN 26 (H) 7 - 20 mg/dL    Creatinine 1.00 0.60 - 1.30 mg/dL    eGFR Non African Am 71 >60 mL/min/1.73    eGFR African Am 86 >60 mL/min/1.73    BUN/Creatinine Ratio 26.0 (H) 6.3 - 21.9    Sodium 142 137 - 145 mmol/L    Potassium 4.4 3.5 - 5.1 mmol/L    Chloride 101 98 - 107 mmol/L    Total CO2 29.0 26.0 - 30.0 mmol/L    Calcium 9.7 8.4 - 10.2 mg/dL    Total Protein 7.0 6.3 - 8.2 g/dL    Albumin 4.20 3.50 - 5.00 g/dL    Globulin 2.8 gm/dL    A/G Ratio 1.5 1.0 - 2.0 g/dL    Total Bilirubin 0.3 0.2 - 1.3 mg/dL    Alkaline Phosphatase 83 38 - 126 U/L    AST (SGOT) 35 15 - 46 U/L    ALT (SGPT) 41 13 - 69 U/L   PSA Screen   Result Value Ref Range    PSA 0.363 0.060 - 4.000 ng/mL   [facility] (IgG / M)   Result Value Ref Range    RMSF IgG Negative Negative    RMSF IgM 0.23 0.00 - 0.89 index   Myoglobin, Serum    Result Value Ref Range    Myoglobin 62.4 0.0 - 101.0 ng/mL   CBC & Differential   Result Value Ref Range    WBC 6.73 4.80 - 10.80 10*3/mm3    RBC 4.22 (L) 4.70 - 6.10 10*6/mm3    Hemoglobin 12.6 (L) 14.0 - 18.0 g/dL    Hematocrit 37.5 (L) 42.0 - 52.0 %    MCV 88.9 80.0 - 94.0 fL    MCH 29.9 27.0 - 31.0 pg    MCHC 33.6 30.0 - 37.0 g/dL    RDW 13.4 11.5 - 14.5 %    Platelets 176 130 - 400 10*3/mm3    Neutrophil Rel % 61.7 37.0 - 80.0 %    Lymphocyte Rel % 25.0 10.0 - 50.0 %    Monocyte Rel % 9.7 0.0 - 12.0 %    Eosinophil Rel % 3.1 0.0 - 7.0 %    Basophil Rel % 0.4 0.0 - 2.5 %    Neutrophils Absolute 4.15 2.00 - 6.90 10*3/mm3    Lymphocytes Absolute 1.68 0.60 - 3.40 10*3/mm3    Monocytes Absolute 0.65 0.00 - 0.90 10*3/mm3    Eosinophils Absolute 0.21 0.00 - 0.70 10*3/mm3    Basophils Absolute 0.03 0.00 - 0.20 10*3/mm3    Immature Granulocyte Rel % 0.1 0.0 - 0.6 %    Immature Grans Absolute 0.01 0.00 - 0.06 10*3/mm3    nRBC 0.0 0.0 - 0.0 /100 WBC         Assessment/Plan   Last colonoscopy 4 years ago.  Hx of prostate cancer. Treated in past. Last psa normal.    Wake and hard to walk.    Ray was seen today for hypertension and fatigue.    Diagnoses and all orders for this visit:    Essential hypertension  -     Comprehensive Metabolic Panel  -     Ambulatory Referral to Cardiology    Fatigue, unspecified type  -     Comprehensive Metabolic Panel  -     Sedimentation Rate  -     Ambulatory Referral to Cardiology    Night sweats  -     Comprehensive Metabolic Panel  -     Sedimentation Rate  -     Ambulatory Referral to Cardiology  -     CBC & Differential  -     [place] Spotted Fever, IgM  -     [place] Spotted Fever, IgG    Nystagmus  -     MRI Brain With & Without Contrast  -     CBC & Differential    Loss of balance  -     MRI Brain With & Without Contrast  -     CBC & Differential    Weakness  -     Ambulatory Referral to Cardiology  -     Myoglobin, Serum  -     CK    Early satiety  -     CBC &  Differential  -     US spleen; Future    Mass of left side of neck  -     Ambulatory Referral to General Surgery    Polymyositis (CMS/HCC)    Arthritis    Ataxic gait    Other orders  -     minocycline (MINOCIN) 100 MG capsule; Take 1 capsule by mouth Every 12 (Twelve) Hours.      No Follow-up on file.          There are no Patient Instructions on file for this visit.     Yobany Nolasco MD    Assessment/Plan

## 2018-08-09 ENCOUNTER — HOSPITAL ENCOUNTER (OUTPATIENT)
Dept: MRI IMAGING | Facility: HOSPITAL | Age: 83
Discharge: HOME | End: 2018-08-09
Attending: INTERNAL MEDICINE | Admitting: INTERNAL MEDICINE

## 2018-08-09 ENCOUNTER — HOSPITAL ENCOUNTER (OUTPATIENT)
Dept: ULTRASOUND IMAGING | Facility: HOSPITAL | Age: 83
Discharge: HOME | End: 2018-08-09
Attending: INTERNAL MEDICINE

## 2018-08-09 DIAGNOSIS — R68.81 EARLY SATIETY: ICD-10-CM

## 2018-08-09 PROCEDURE — A9577 INJ MULTIHANCE: HCPCS | Performed by: INTERNAL MEDICINE

## 2018-08-09 PROCEDURE — 70553 MRI BRAIN STEM W/O & W/DYE: CPT

## 2018-08-09 PROCEDURE — 76705 ECHO EXAM OF ABDOMEN: CPT

## 2018-08-09 PROCEDURE — 0 GADOBENATE DIMEGLUMINE 529 MG/ML SOLUTION: Performed by: INTERNAL MEDICINE

## 2018-08-09 RX ADMIN — GADOBENATE DIMEGLUMINE 15 ML: 529 INJECTION, SOLUTION INTRAVENOUS at 13:58

## 2018-08-10 LAB
ALBUMIN SERPL-MCNC: 4 G/DL (ref 3.5–5)
ALBUMIN/GLOB SERPL: 1.5 G/DL (ref 1–2)
ALP SERPL-CCNC: 75 U/L (ref 38–126)
ALT SERPL-CCNC: 31 U/L (ref 13–69)
AST SERPL-CCNC: 32 U/L (ref 15–46)
BASOPHILS # BLD AUTO: 0.04 10*3/MM3 (ref 0–0.2)
BASOPHILS NFR BLD AUTO: 0.6 % (ref 0–2.5)
BILIRUB SERPL-MCNC: 0.4 MG/DL (ref 0.2–1.3)
BUN SERPL-MCNC: 23 MG/DL (ref 7–20)
BUN/CREAT SERPL: 20.9 (ref 6.3–21.9)
CALCIUM SERPL-MCNC: 9.6 MG/DL (ref 8.4–10.2)
CHLORIDE SERPL-SCNC: 103 MMOL/L (ref 98–107)
CK SERPL-CCNC: 82 U/L (ref 30–170)
CO2 SERPL-SCNC: 29 MMOL/L (ref 26–30)
CREAT SERPL-MCNC: 1.1 MG/DL (ref 0.6–1.3)
EOSINOPHIL # BLD AUTO: 0.2 10*3/MM3 (ref 0–0.7)
EOSINOPHIL NFR BLD AUTO: 3.1 % (ref 0–7)
ERYTHROCYTE [DISTWIDTH] IN BLOOD BY AUTOMATED COUNT: 13.8 % (ref 11.5–14.5)
ERYTHROCYTE [SEDIMENTATION RATE] IN BLOOD BY WESTERGREN METHOD: 20 MM/HR (ref 0–15)
GLOBULIN SER CALC-MCNC: 2.7 GM/DL
GLUCOSE SERPL-MCNC: 113 MG/DL (ref 74–98)
HCT VFR BLD AUTO: 37.6 % (ref 42–52)
HGB BLD-MCNC: 12.2 G/DL (ref 14–18)
IMM GRANULOCYTES # BLD: 0.02 10*3/MM3 (ref 0–0.06)
IMM GRANULOCYTES NFR BLD: 0.3 % (ref 0–0.6)
LYMPHOCYTES # BLD AUTO: 1.95 10*3/MM3 (ref 0.6–3.4)
LYMPHOCYTES NFR BLD AUTO: 30.7 % (ref 10–50)
MCH RBC QN AUTO: 29.4 PG (ref 27–31)
MCHC RBC AUTO-ENTMCNC: 32.4 G/DL (ref 30–37)
MCV RBC AUTO: 90.6 FL (ref 80–94)
MONOCYTES # BLD AUTO: 0.78 10*3/MM3 (ref 0–0.9)
MONOCYTES NFR BLD AUTO: 12.3 % (ref 0–12)
MYOGLOBIN SERPL-MCNC: 71.2 NG/ML (ref 0–101)
NEUTROPHILS # BLD AUTO: 3.36 10*3/MM3 (ref 2–6.9)
NEUTROPHILS NFR BLD AUTO: 53 % (ref 37–80)
NRBC BLD AUTO-RTO: 0 /100 WBC (ref 0–0)
PLATELET # BLD AUTO: 172 10*3/MM3 (ref 130–400)
POTASSIUM SERPL-SCNC: 4.4 MMOL/L (ref 3.5–5.1)
PROT SERPL-MCNC: 6.7 G/DL (ref 6.3–8.2)
R RICKETTSI IGG SER QL IA: NEGATIVE
R RICKETTSI IGM SER-ACNC: 0.25 INDEX (ref 0–0.89)
RBC # BLD AUTO: 4.15 10*6/MM3 (ref 4.7–6.1)
SODIUM SERPL-SCNC: 141 MMOL/L (ref 137–145)
WBC # BLD AUTO: 6.35 10*3/MM3 (ref 4.8–10.8)

## 2018-08-13 NOTE — PROGRESS NOTES
Please let them know the the RMSF titers are high.  He soul be getting better on the abx that I gave him.  Please let the Health dept know.

## 2018-09-06 ENCOUNTER — OFFICE VISIT (OUTPATIENT)
Dept: SURGERY | Facility: CLINIC | Age: 83
End: 2018-09-06

## 2018-09-06 VITALS
BODY MASS INDEX: 27.72 KG/M2 | HEART RATE: 81 BPM | SYSTOLIC BLOOD PRESSURE: 128 MMHG | OXYGEN SATURATION: 98 % | DIASTOLIC BLOOD PRESSURE: 64 MMHG | WEIGHT: 198 LBS | TEMPERATURE: 97.7 F | HEIGHT: 71 IN

## 2018-09-06 DIAGNOSIS — R22.1 NECK MASS: Primary | ICD-10-CM

## 2018-09-06 PROCEDURE — 99203 OFFICE O/P NEW LOW 30 MIN: CPT | Performed by: SURGERY

## 2018-09-06 RX ORDER — CARVEDILOL 12.5 MG/1
12.5 TABLET ORAL 2 TIMES DAILY
Refills: 3 | COMMUNITY
Start: 2018-08-24 | End: 2018-09-06

## 2018-09-06 RX ORDER — FLUTICASONE PROPIONATE 50 MCG
SPRAY, SUSPENSION (ML) NASAL
COMMUNITY
End: 2018-09-06

## 2018-09-06 RX ORDER — TRAMADOL HYDROCHLORIDE 50 MG/1
TABLET ORAL
COMMUNITY
End: 2018-09-06

## 2018-09-06 RX ORDER — IRBESARTAN AND HYDROCHLOROTHIAZIDE 150; 12.5 MG/1; MG/1
1 TABLET, FILM COATED ORAL DAILY
Refills: 3 | COMMUNITY
Start: 2018-08-24 | End: 2019-03-21

## 2018-09-06 RX ORDER — LISINOPRIL 20 MG/1
TABLET ORAL
COMMUNITY
End: 2018-09-06

## 2018-09-06 NOTE — PROGRESS NOTES
Subjective   Sukumar Cosby is a 84 y.o. male.   Chief Complaint   Patient presents with   • Mass     on the neck       History of Present Illness   Mr. Cosby is an 83 yo male referred for evaluation of a mass of the left posterior neck.  He was not aware of it prior to being told it was there by his PCP.  He denies pain, drainage, changes in size or other symptoms.  He has not had any imaging.  He denies feeling a palpable mass.  He describes left neck stiffness.        The following portions of the patient's history were reviewed and updated as appropriate: allergies, current medications, past family history, past medical history, past social history, past surgical history and problem list.     Patient Active Problem List   Diagnosis   • Arthritis   • Ataxic gait   • BMI 30.0-30.9,adult   • BPPV (benign paroxysmal positional vertigo)   • Chronic pain   • Chronic kidney disease   • Depression   • Enlarged prostate without lower urinary tract symptoms (luts)   • GERD without esophagitis   • Gout   • Hypertension   • Hypogonadism in male   • Lumbar radiculopathy   • Peripheral neuropathy   • Restless legs syndrome   • Spinal stenosis   • Ventral hernia   • Polymyositis (CMS/HCC)   • Prostate CA (CMS/HCC)       Past Medical History:   Diagnosis Date   • Arthritis    • Fluid in knee    • GERD (gastroesophageal reflux disease)    • History of anemia    • History of blood transfusion    • History of bronchitis    • History of colonic polyps    • History of fibromyalgia    • History of gallstones    • Hypertension    • Prostate cancer (CMS/HCC)    • Sinusitis        Past Surgical History:   Procedure Laterality Date   • CHOLECYSTECTOMY  2005   • COLONOSCOPY  2013   • UPPER GASTROINTESTINAL ENDOSCOPY  2013       Medications:     Current Outpatient Prescriptions:   •  acetaminophen (TYLENOL) 650 MG 8 hr tablet, Take 650 mg by mouth Every 8 (Eight) Hours As Needed for Mild Pain ., Disp: , Rfl:   •  albuterol (VENTOLIN HFA) 108  (90 BASE) MCG/ACT inhaler, Ventolin  (90 Base) MCG/ACT Inhalation Aerosol Solution; Patient Sig: Ventolin  (90 Base) MCG/ACT Inhalation Aerosol Solution ; 18; 0; 24-May-2013; Active, Disp: , Rfl:   •  allopurinol (ZYLOPRIM) 100 MG tablet, Take 1 tablet by mouth Daily., Disp: 30 tablet, Rfl: 11  •  amLODIPine (NORVASC) 5 MG tablet, Take 1 tablet by mouth Daily., Disp: 90 tablet, Rfl: 3  •  irbesartan-hydrochlorothiazide (AVALIDE) 150-12.5 MG tablet, Take 1 tablet by mouth Daily., Disp: , Rfl: 3  •  losartan (COZAAR) 100 MG tablet, TAKE ONE TABLET BY MOUTH EVERY DAY, Disp: 90 tablet, Rfl: 1  •  meclizine 25 MG chewable tablet chewable tablet, Chew 1 tablet Every 8 (Eight) Hours., Disp: 30 tablet, Rfl: 4  •  Multiple Vitamins-Minerals (HAIR/SKIN/NAILS/BIOTIN) tablet, Take  by mouth Daily., Disp: , Rfl:   •  carvedilol (COREG) 3.125 MG tablet, Take 1 tablet by mouth 2 (Two) Times a Day With Meals., Disp: 60 tablet, Rfl: 11    Allergies:   No Known Allergies      Family History   Problem Relation Age of Onset   • Cancer Mother    • Migraines Mother    • Osteoporosis Mother        Social History     Social History   • Marital status:      Social History Main Topics   • Smoking status: Never Smoker   • Smokeless tobacco: Never Used   • Alcohol use No   • Drug use: No     Other Topics Concern   • Not on file         Review of Systems   Constitutional: Negative for chills, fever and unexpected weight change.   HENT: Negative for trouble swallowing and voice change.    Eyes: Negative for visual disturbance.   Respiratory: Negative for apnea, cough, chest tightness, shortness of breath and wheezing.    Cardiovascular: Negative for chest pain, palpitations and leg swelling.   Gastrointestinal: Negative for abdominal distention, abdominal pain, anal bleeding, blood in stool, constipation, diarrhea, nausea, rectal pain and vomiting.   Endocrine: Negative for cold intolerance and heat intolerance.  "  Genitourinary: Negative for difficulty urinating, dysuria, flank pain, scrotal swelling and testicular pain.   Musculoskeletal: Negative for back pain, gait problem, joint swelling and neck pain.   Skin: Negative for color change, rash and wound.   Neurological: Positive for dizziness and headaches. Negative for syncope, speech difficulty, weakness and numbness.   Hematological: Negative for adenopathy. Does not bruise/bleed easily.   Psychiatric/Behavioral: Negative for confusion. The patient is not nervous/anxious.        Objective    /64   Pulse 81   Temp 97.7 °F (36.5 °C) (Temporal Artery )   Ht 180.3 cm (70.98\")   Wt 89.8 kg (198 lb)   SpO2 98%   BMI 27.63 kg/m²     Physical Exam   Constitutional: He is oriented to person, place, and time. He appears well-developed and well-nourished.   HENT:   Head: Normocephalic and atraumatic.   Eyes: Pupils are equal, round, and reactive to light. EOM are normal. No scleral icterus.   Neck: Neck supple.   Cardiovascular: Regular rhythm.    Pulmonary/Chest: Effort normal.   Abdominal: Soft. He exhibits no distension. There is no tenderness.   Musculoskeletal:   No palpable posterior neck mass.    Neurological: He is alert and oriented to person, place, and time.   Skin: Skin is warm and dry.   Psychiatric: He has a normal mood and affect. His behavior is normal.       Assessment/Plan   Ray was seen today for mass.    Diagnoses and all orders for this visit:    Neck mass  -     US head neck soft tissue; Future      I did recommend that we proceed with ultrasound of the area in question.  This was performed in the office today.  No obvious abnormality was noted in the area of reported concern.  He was advised of this.  Please refer to the separately dictated ultrasound report.  He may follow up with me as needed.  I recommended that he follow up with his PCP.          "

## 2018-09-15 DIAGNOSIS — I10 ESSENTIAL HYPERTENSION: ICD-10-CM

## 2018-09-15 RX ORDER — CARVEDILOL 3.12 MG/1
TABLET ORAL
Qty: 60 TABLET | Refills: 11 | Status: CANCELLED | OUTPATIENT
Start: 2018-09-15

## 2018-09-17 DIAGNOSIS — I10 ESSENTIAL HYPERTENSION: ICD-10-CM

## 2018-09-17 RX ORDER — CARVEDILOL 3.12 MG/1
3.12 TABLET ORAL 2 TIMES DAILY WITH MEALS
Qty: 60 TABLET | Refills: 11 | Status: SHIPPED | OUTPATIENT
Start: 2018-09-17 | End: 2018-10-29

## 2018-10-29 ENCOUNTER — OFFICE VISIT (OUTPATIENT)
Dept: INTERNAL MEDICINE | Facility: CLINIC | Age: 83
End: 2018-10-29

## 2018-10-29 VITALS
OXYGEN SATURATION: 98 % | TEMPERATURE: 98.1 F | BODY MASS INDEX: 28.42 KG/M2 | DIASTOLIC BLOOD PRESSURE: 60 MMHG | HEIGHT: 71 IN | RESPIRATION RATE: 16 BRPM | SYSTOLIC BLOOD PRESSURE: 123 MMHG | HEART RATE: 59 BPM | WEIGHT: 203 LBS

## 2018-10-29 DIAGNOSIS — R42 VERTIGO: Primary | ICD-10-CM

## 2018-10-29 DIAGNOSIS — Z23 NEED FOR VACCINATION: ICD-10-CM

## 2018-10-29 DIAGNOSIS — R55 SYNCOPE, UNSPECIFIED SYNCOPE TYPE: ICD-10-CM

## 2018-10-29 DIAGNOSIS — H91.92 HEARING LOSS ASSOCIATED WITH SYNDROME OF LEFT EAR: ICD-10-CM

## 2018-10-29 PROCEDURE — 96160 PT-FOCUSED HLTH RISK ASSMT: CPT | Performed by: INTERNAL MEDICINE

## 2018-10-29 PROCEDURE — 90732 PPSV23 VACC 2 YRS+ SUBQ/IM: CPT | Performed by: INTERNAL MEDICINE

## 2018-10-29 PROCEDURE — G0438 PPPS, INITIAL VISIT: HCPCS | Performed by: INTERNAL MEDICINE

## 2018-10-29 PROCEDURE — 90662 IIV NO PRSV INCREASED AG IM: CPT | Performed by: INTERNAL MEDICINE

## 2018-10-29 PROCEDURE — G0009 ADMIN PNEUMOCOCCAL VACCINE: HCPCS | Performed by: INTERNAL MEDICINE

## 2018-10-29 PROCEDURE — 99214 OFFICE O/P EST MOD 30 MIN: CPT | Performed by: INTERNAL MEDICINE

## 2018-10-29 PROCEDURE — 90632 HEPA VACCINE ADULT IM: CPT | Performed by: INTERNAL MEDICINE

## 2018-10-29 PROCEDURE — 99397 PER PM REEVAL EST PAT 65+ YR: CPT | Performed by: INTERNAL MEDICINE

## 2018-10-29 PROCEDURE — 90471 IMMUNIZATION ADMIN: CPT | Performed by: INTERNAL MEDICINE

## 2018-10-29 PROCEDURE — G0008 ADMIN INFLUENZA VIRUS VAC: HCPCS | Performed by: INTERNAL MEDICINE

## 2018-10-29 NOTE — PROGRESS NOTES
Subjective     Patient ID: Sukumar Cosby is a 84 y.o. male. Patient is here for management of multiple medical problems.     Chief Complaint   Patient presents with   • Hypertension     follow-up   • Weakness - Generalized     patient having extreme weakness, he states he passed out at [employer] while sitting in the pew.     History of Present Illness     synocopal spell in [employer].    Vertigo getting worse. Seen cardiology. Us neck and basalar atery normal. No HA. Hearing loss in left ear.        The following portions of the patient's history were reviewed and updated as appropriate: allergies, current medications, past family history, past medical history, past social history, past surgical history and problem list.    Review of Systems   Constitutional: Positive for fatigue.   HENT: Positive for hearing loss.    Neurological: Positive for weakness.   Psychiatric/Behavioral: Negative for sleep disturbance.   All other systems reviewed and are negative.      Current Outpatient Prescriptions:   •  acetaminophen (TYLENOL) 650 MG 8 hr tablet, Take 650 mg by mouth Every 8 (Eight) Hours As Needed for Mild Pain ., Disp: , Rfl:   •  albuterol (VENTOLIN HFA) 108 (90 BASE) MCG/ACT inhaler, Ventolin  (90 Base) MCG/ACT Inhalation Aerosol Solution; Patient Sig: Ventolin  (90 Base) MCG/ACT Inhalation Aerosol Solution ; 18; 0; 24-May-2013; Active, Disp: , Rfl:   •  allopurinol (ZYLOPRIM) 100 MG tablet, Take 1 tablet by mouth Daily., Disp: 30 tablet, Rfl: 11  •  amLODIPine (NORVASC) 5 MG tablet, Take 1 tablet by mouth Daily., Disp: 90 tablet, Rfl: 3  •  irbesartan-hydrochlorothiazide (AVALIDE) 150-12.5 MG tablet, Take 1 tablet by mouth Daily., Disp: , Rfl: 3  •  losartan (COZAAR) 100 MG tablet, TAKE ONE TABLET BY MOUTH EVERY DAY, Disp: 90 tablet, Rfl: 1  •  meclizine 25 MG chewable tablet chewable tablet, Chew 1 tablet Every 8 (Eight) Hours., Disp: 30 tablet, Rfl: 4  •  Multiple Vitamins-Minerals (HAIR/SKIN/NAILS/BIOTIN)  "tablet, Take  by mouth Daily., Disp: , Rfl:     Objective      Blood pressure 123/60, pulse 59, temperature 98.1 °F (36.7 °C), temperature source Oral, resp. rate 16, height 180.3 cm (71\"), weight 92.1 kg (203 lb), SpO2 98 %.    Physical Exam     General Appearance:    Alert, cooperative, no distress, appears stated age   Head:    Normocephalic, without obvious abnormality, atraumatic   Eyes:    PERRL, conjunctiva/corneas clear, EOM's intact   Ears:    Normal TM's and external ear canals, both ears   Nose:   Nares normal, septum midline, mucosa normal, no drainage   or sinus tenderness   Throat:   Lips, mucosa, and tongue normal; teeth and gums normal   Neck:   Supple, symmetrical, trachea midline, no adenopathy;        thyroid:  No enlargement/tenderness/nodules; no carotid    bruit or JVD   Back:     Symmetric, no curvature, ROM normal, no CVA tenderness   Lungs:     Clear to auscultation bilaterally, respirations unlabored   Chest wall:    No tenderness or deformity   Heart:    Regular rate and rhythm, S1 and S2 normal, no murmur,        rub or gallop   Abdomen:     Soft, non-tender, bowel sounds active all four quadrants,     no masses, no organomegaly   Extremities:   Extremities normal, atraumatic, no cyanosis or edema   Pulses:   2+ and symmetric all extremities   Skin:   Skin color, texture, turgor normal, no rashes or lesions   Lymph nodes:   Cervical, supraclavicular, and axillary nodes normal   Neurologic:   CNII-XII intact. Normal strength, sensation and reflexes       throughout      Results for orders placed or performed in visit on 08/08/18   Comprehensive Metabolic Panel   Result Value Ref Range    Glucose 113 (H) 74 - 98 mg/dL    BUN 23 (H) 7 - 20 mg/dL    Creatinine 1.10 0.60 - 1.30 mg/dL    eGFR Non African Am 64 >60 mL/min/1.73    eGFR African Am 77 >60 mL/min/1.73    BUN/Creatinine Ratio 20.9 6.3 - 21.9    Sodium 141 137 - 145 mmol/L    Potassium 4.4 3.5 - 5.1 mmol/L    Chloride 103 98 - 107 " mmol/L    Total CO2 29.0 26.0 - 30.0 mmol/L    Calcium 9.6 8.4 - 10.2 mg/dL    Total Protein 6.7 6.3 - 8.2 g/dL    Albumin 4.00 3.50 - 5.00 g/dL    Globulin 2.7 gm/dL    A/G Ratio 1.5 1.0 - 2.0 g/dL    Total Bilirubin 0.4 0.2 - 1.3 mg/dL    Alkaline Phosphatase 75 38 - 126 U/L    AST (SGOT) 32 15 - 46 U/L    ALT (SGPT) 31 13 - 69 U/L   Sedimentation Rate   Result Value Ref Range    Sed Rate 20 (H) 0 - 15 mm/hr   Myoglobin, Serum   Result Value Ref Range    Myoglobin 71.2 0.0 - 101.0 ng/mL   CK   Result Value Ref Range    Creatine Kinase 82 30 - 170 U/L   [place] Spotted Fever, IgM   Result Value Ref Range    [facility] IgM 0.25 0.00 - 0.89 index   [facility] Spotted Fever, IgG   Result Value Ref Range    RMSF IgG Negative Negative   CBC & Differential   Result Value Ref Range    WBC 6.35 4.80 - 10.80 10*3/mm3    RBC 4.15 (L) 4.70 - 6.10 10*6/mm3    Hemoglobin 12.2 (L) 14.0 - 18.0 g/dL    Hematocrit 37.6 (L) 42.0 - 52.0 %    MCV 90.6 80.0 - 94.0 fL    MCH 29.4 27.0 - 31.0 pg    MCHC 32.4 30.0 - 37.0 g/dL    RDW 13.8 11.5 - 14.5 %    Platelets 172 130 - 400 10*3/mm3    Neutrophil Rel % 53.0 37.0 - 80.0 %    Lymphocyte Rel % 30.7 10.0 - 50.0 %    Monocyte Rel % 12.3 (H) 0.0 - 12.0 %    Eosinophil Rel % 3.1 0.0 - 7.0 %    Basophil Rel % 0.6 0.0 - 2.5 %    Neutrophils Absolute 3.36 2.00 - 6.90 10*3/mm3    Lymphocytes Absolute 1.95 0.60 - 3.40 10*3/mm3    Monocytes Absolute 0.78 0.00 - 0.90 10*3/mm3    Eosinophils Absolute 0.20 0.00 - 0.70 10*3/mm3    Basophils Absolute 0.04 0.00 - 0.20 10*3/mm3    Immature Granulocyte Rel % 0.3 0.0 - 0.6 %    Immature Grans Absolute 0.02 0.00 - 0.06 10*3/mm3    nRBC 0.0 0.0 - 0.0 /100 WBC         Assessment/Plan   Syncope  Will stop coreg.      Ray was seen today for hypertension and weakness - generalized.    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          Patient Instructions   Stop Carvedilol.           Yobany Nolasco MD    Assessment/Plan

## 2018-10-29 NOTE — PROGRESS NOTES
QUICK REFERENCE INFORMATION:  The ABCs of the Annual Wellness Visit    Initial Medicare Wellness Visit    HEALTH RISK ASSESSMENT    1934    Recent Hospitalizations:  No hospitalization(s) within the last year..      Pain Scale. No pain to report.      Current Medical Providers:  Patient Care Team:  Yobany Nolasco MD as PCP - General  [facility], Darrell MENDENHALL MD as PCP - Family Medicine  Doris Cox MD as Surgeon (General Surgery)        Smoking Status:  History   Smoking Status   • Never Smoker   Smokeless Tobacco   • Never Used       Alcohol Consumption:  History   Alcohol Use No       Depression Screen:   PHQ-2/PHQ-9 Depression Screening 10/29/2018   Little interest or pleasure in doing things 0   Feeling down, depressed, or hopeless 0   Trouble falling or staying asleep, or sleeping too much 0   Feeling tired or having little energy 0   Poor appetite or overeating 0   Feeling bad about yourself - or that you are a failure or have let yourself or your family down 0   Trouble concentrating on things, such as reading the newspaper or watching television 0   Moving or speaking so slowly that other people could have noticed. Or the opposite - being so fidgety or restless that you have been moving around a lot more than usual 0   Thoughts that you would be better off dead, or of hurting yourself in some way 0   Total Score 0       Health Habits and Functional and Cognitive Screening:  Functional & Cognitive Status 10/29/2018   Do you have difficulty preparing food and eating? No   Do you have difficulty bathing yourself, getting dressed or grooming yourself? No   Do you have difficulty using the toilet? No   Do you have difficulty moving around from place to place? No   Do you have trouble with steps or getting out of a bed or a chair? No   In the past year have you fallen or experienced a near fall? No   Current Diet Well Balanced Diet   Dental Exam Up to date   Eye Exam Up to date   Exercise (times per week) 0  times per week   Current Exercise Activities Include None   Do you need help using the phone?  No   Are you deaf or do you have serious difficulty hearing?  Yes   Do you need help with transportation? No   Do you need help shopping? No   Do you need help preparing meals?  No   Do you need help with housework?  No   Do you need help with laundry? No   Do you need help taking your medications? No   Do you need help managing money? No   Do you ever drive or ride in a car without wearing a seat belt? No   Have you felt unusual stress, anger or loneliness in the last month? No   Who do you live with? Alone   If you need help, do you have trouble finding someone available to you? No   Do you have difficulty concentrating, remembering or making decisions? Yes           Does the patient have evidence of cognitive impairment? No    Asiprin use counseling: Taking ASA appropriately as indicated      Recent Lab Results:    Visual Acuity:  No exam data present    Age-appropriate Screening Schedule:  Refer to the list below for future screening recommendations based on patient's age, sex and/or medical conditions. Orders for these recommended tests are listed in the plan section. The patient has been provided with a written plan.    Health Maintenance   Topic Date Due   • TDAP/TD VACCINES (1 - Tdap) 02/13/1953   • ZOSTER VACCINE (1 of 2) 02/13/1984   • PNEUMOCOCCAL VACCINES (65+ LOW/MEDIUM RISK) (2 of 2 - PCV13) 10/29/2019   • INFLUENZA VACCINE  Completed        Subjective   History of Present Illness    Sukumar Cosby is a 84 y.o. male who presents for an Annual Wellness Visit.    The following portions of the patient's history were reviewed and updated as appropriate: allergies, current medications, past family history, past medical history, past social history, past surgical history and problem list.    Outpatient Medications Prior to Visit   Medication Sig Dispense Refill   • acetaminophen (TYLENOL) 650 MG 8 hr tablet Take 650 mg  by mouth Every 8 (Eight) Hours As Needed for Mild Pain .     • albuterol (VENTOLIN HFA) 108 (90 BASE) MCG/ACT inhaler Ventolin  (90 Base) MCG/ACT Inhalation Aerosol Solution; Patient Sig: Ventolin  (90 Base) MCG/ACT Inhalation Aerosol Solution ; 18; 0; 24-May-2013; Active     • allopurinol (ZYLOPRIM) 100 MG tablet Take 1 tablet by mouth Daily. 30 tablet 11   • amLODIPine (NORVASC) 5 MG tablet Take 1 tablet by mouth Daily. 90 tablet 3   • irbesartan-hydrochlorothiazide (AVALIDE) 150-12.5 MG tablet Take 1 tablet by mouth Daily.  3   • losartan (COZAAR) 100 MG tablet TAKE ONE TABLET BY MOUTH EVERY DAY 90 tablet 1   • meclizine 25 MG chewable tablet chewable tablet Chew 1 tablet Every 8 (Eight) Hours. 30 tablet 4   • Multiple Vitamins-Minerals (HAIR/SKIN/NAILS/BIOTIN) tablet Take  by mouth Daily.     • carvedilol (COREG) 3.125 MG tablet Take 1 tablet by mouth 2 (Two) Times a Day With Meals. 60 tablet 11     No facility-administered medications prior to visit.        Patient Active Problem List   Diagnosis   • Arthritis   • Ataxic gait   • BMI 30.0-30.9,adult   • BPPV (benign paroxysmal positional vertigo)   • Chronic pain   • Chronic kidney disease   • Depression   • Enlarged prostate without lower urinary tract symptoms (luts)   • GERD without esophagitis   • Gout   • Hypertension   • Hypogonadism in male   • Lumbar radiculopathy   • Peripheral neuropathy   • Restless legs syndrome   • Spinal stenosis   • Ventral hernia   • Polymyositis (CMS/[facility])   • Prostate CA (CMS/[facility])       Advance Care Planning:  has NO advance directive - not interested in additional information    Identification of Risk Factors:  Risk factors include: weight , cardiovascular risk and increased fall risk.    Review of Systems    Compared to one year ago, the patient feels his physical health is the same.  Compared to one year ago, the patient feels his mental health is the same.    Objective     Physical Exam    Vitals:    10/29/18  "1313   BP: 123/60   BP Location: Left arm   Patient Position: Sitting   Cuff Size: Large Adult   Pulse: 59   Resp: 16   Temp: 98.1 °F (36.7 °C)   TempSrc: Oral   SpO2: 98%   Weight: 92.1 kg (203 lb)   Height: 180.3 cm (71\")       Patient's Body mass index is 28.31 kg/m². BMI is above normal parameters. Recommendations include: no follow-up required.      Assessment/Plan   Patient Self-Management and Personalized Health Advice  The patient has been provided with information about: diet, exercise and fall prevention and preventive services including:   · Advance directive, Fall Risk assessment done, Fall Risk plan of care done, Influenza vaccine, Pneumococcal vaccine , Zostavax vaccine (Herpes Zoster).    Visit Diagnoses:    ICD-10-CM ICD-9-CM   1. Vertigo R42 780.4   2. 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 Syncope, unspecified syncope type R55 780.2       Orders Placed This Encounter   Procedures   • Ambulatory Referral to ENT (Otolaryngology)     Referral Priority:   Routine     Referral Type:   Consultation     Referral Reason:   Specialty Services Required     Requested Specialty:   Otolaryngology     Number of Visits Requested:   1       Outpatient Encounter Prescriptions as of 10/29/2018   Medication Sig Dispense Refill   • acetaminophen (TYLENOL) 650 MG 8 hr tablet Take 650 mg by mouth Every 8 (Eight) Hours As Needed for Mild Pain .     • albuterol (VENTOLIN HFA) 108 (90 BASE) MCG/ACT inhaler Ventolin  (90 Base) MCG/ACT Inhalation Aerosol Solution; Patient Sig: Ventolin  (90 Base) MCG/ACT Inhalation Aerosol Solution ; 18; 0; 24-May-2013; Active     • allopurinol (ZYLOPRIM) 100 MG tablet Take 1 tablet by mouth Daily. 30 tablet 11   • amLODIPine (NORVASC) 5 MG tablet Take 1 tablet by mouth Daily. 90 tablet 3   • irbesartan-hydrochlorothiazide (AVALIDE) 150-12.5 MG tablet Take 1 tablet by mouth Daily.  3   • losartan (COZAAR) 100 MG tablet TAKE ONE TABLET BY MOUTH EVERY " DAY 90 tablet 1   • meclizine 25 MG chewable tablet chewable tablet Chew 1 tablet Every 8 (Eight) Hours. 30 tablet 4   • Multiple Vitamins-Minerals (HAIR/SKIN/NAILS/BIOTIN) tablet Take  by mouth Daily.     • [DISCONTINUED] carvedilol (COREG) 3.125 MG tablet Take 1 tablet by mouth 2 (Two) Times a Day With Meals. 60 tablet 11     No facility-administered encounter medications on file as of 10/29/2018.        Reviewed use of high risk medication in the elderly: yes  Reviewed for potential of harmful drug interactions in the elderly: yes    Follow Up:  Return in about 6 weeks (around 12/10/2018).     An After Visit Summary and PPPS with all of these plans were given to the patient.

## 2018-11-29 DIAGNOSIS — R53.1 WEAKNESS GENERALIZED: ICD-10-CM

## 2018-11-29 DIAGNOSIS — R42 VERTIGO: ICD-10-CM

## 2018-11-29 DIAGNOSIS — R97.20 ELEVATED PSA: ICD-10-CM

## 2018-11-29 DIAGNOSIS — R53.83 FATIGUE, UNSPECIFIED TYPE: ICD-10-CM

## 2018-11-29 DIAGNOSIS — M10.079 IDIOPATHIC GOUT OF FOOT, UNSPECIFIED CHRONICITY, UNSPECIFIED LATERALITY: ICD-10-CM

## 2018-11-29 DIAGNOSIS — A79.9 RICKETTSIOSES: ICD-10-CM

## 2018-11-29 RX ORDER — ALLOPURINOL 100 MG/1
TABLET ORAL
Qty: 30 TABLET | Refills: 11 | Status: SHIPPED | OUTPATIENT
Start: 2018-11-29 | End: 2019-09-20 | Stop reason: SDUPTHER

## 2018-12-10 ENCOUNTER — OFFICE VISIT (OUTPATIENT)
Dept: INTERNAL MEDICINE | Facility: CLINIC | Age: 83
End: 2018-12-10

## 2018-12-10 VITALS
RESPIRATION RATE: 16 BRPM | DIASTOLIC BLOOD PRESSURE: 66 MMHG | BODY MASS INDEX: 27.58 KG/M2 | WEIGHT: 197 LBS | OXYGEN SATURATION: 99 % | HEART RATE: 96 BPM | TEMPERATURE: 97.7 F | HEIGHT: 71 IN | SYSTOLIC BLOOD PRESSURE: 127 MMHG

## 2018-12-10 DIAGNOSIS — H81.13 BENIGN PAROXYSMAL POSITIONAL VERTIGO DUE TO BILATERAL VESTIBULAR DISORDER: ICD-10-CM

## 2018-12-10 DIAGNOSIS — N18.2 STAGE 2 CHRONIC KIDNEY DISEASE: ICD-10-CM

## 2018-12-10 DIAGNOSIS — I10 ESSENTIAL HYPERTENSION: Primary | ICD-10-CM

## 2018-12-10 DIAGNOSIS — E79.0 HYPERURICEMIA: ICD-10-CM

## 2018-12-10 DIAGNOSIS — R30.0 DYSURIA: ICD-10-CM

## 2018-12-10 PROCEDURE — 99214 OFFICE O/P EST MOD 30 MIN: CPT | Performed by: INTERNAL MEDICINE

## 2018-12-10 RX ORDER — AMOXICILLIN AND CLAVULANATE POTASSIUM 875; 125 MG/1; MG/1
1 TABLET, FILM COATED ORAL EVERY 12 HOURS SCHEDULED
Qty: 20 TABLET | Refills: 0 | Status: SHIPPED | OUTPATIENT
Start: 2018-12-10 | End: 2019-01-21

## 2018-12-10 RX ORDER — MECLIZINE HCL 25MG 25 MG/1
25 TABLET, CHEWABLE ORAL EVERY 8 HOURS SCHEDULED
Qty: 30 TABLET | Refills: 4 | Status: SHIPPED | OUTPATIENT
Start: 2018-12-10 | End: 2019-01-21 | Stop reason: SDUPTHER

## 2018-12-10 NOTE — PROGRESS NOTES
Subjective     Patient ID: Sukumar Cosby is a 84 y.o. male. Patient is here for management of multiple medical problems.     Chief Complaint   Patient presents with   • Dizziness     patient continuing to have dizziness   • Hypertension     patient states he is having elevated blood pressures at home     Dizziness   This is a chronic problem. The current episode started more than 1 year ago. The problem has been waxing and waning. Pertinent negatives include no abdominal pain, arthralgias, change in bowel habit or chest pain. The treatment provided moderate relief.   Hypertension   This is a chronic problem. The problem has been waxing and waning since onset. The problem is controlled. Pertinent negatives include no chest pain. There are no associated agents to hypertension. Past treatments include angiotensin blockers and diuretics.      Pt with UTI symptoms x 1 month and getting worse.  + dsyuria. + frequency.  No otc      The following portions of the patient's history were reviewed and updated as appropriate: allergies, current medications, past family history, past medical history, past social history, past surgical history and problem list.    Review of Systems   Cardiovascular: Negative for chest pain.   Gastrointestinal: Negative for abdominal pain and change in bowel habit.   Genitourinary: Positive for difficulty urinating, dysuria, enuresis, flank pain and frequency. Negative for discharge, genital sores, hematuria, penile pain, penile swelling, scrotal swelling and testicular pain.   Musculoskeletal: Negative for arthralgias.   Neurological: Positive for dizziness.       Current Outpatient Medications:   •  acetaminophen (TYLENOL) 650 MG 8 hr tablet, Take 650 mg by mouth Every 8 (Eight) Hours As Needed for Mild Pain ., Disp: , Rfl:   •  albuterol (VENTOLIN HFA) 108 (90 BASE) MCG/ACT inhaler, Ventolin  (90 Base) MCG/ACT Inhalation Aerosol Solution; Patient Sig: Ventolin  (90 Base) MCG/ACT  "Inhalation Aerosol Solution ; 18; 0; 24-May-2013; Active, Disp: , Rfl:   •  allopurinol (ZYLOPRIM) 100 MG tablet, TAKE ONE TABLET BY MOUTH EVERY DAY, Disp: 30 tablet, Rfl: 11  •  amLODIPine (NORVASC) 5 MG tablet, Take 1 tablet by mouth Daily., Disp: 90 tablet, Rfl: 3  •  irbesartan-hydrochlorothiazide (AVALIDE) 150-12.5 MG tablet, Take 1 tablet by mouth Daily., Disp: , Rfl: 3  •  losartan (COZAAR) 100 MG tablet, TAKE ONE TABLET BY MOUTH EVERY DAY, Disp: 90 tablet, Rfl: 1  •  meclizine 25 MG chewable tablet chewable tablet, Chew 1 tablet Every 8 (Eight) Hours., Disp: 30 tablet, Rfl: 4  •  Multiple Vitamins-Minerals (HAIR/SKIN/NAILS/BIOTIN) tablet, Take  by mouth Daily., Disp: , Rfl:   •  amoxicillin-clavulanate (AUGMENTIN) 875-125 MG per tablet, Take 1 tablet by mouth Every 12 (Twelve) Hours., Disp: 20 tablet, Rfl: 0    Objective      Blood pressure 127/66, pulse 96, temperature 97.7 °F (36.5 °C), temperature source Oral, resp. rate 16, height 180.3 cm (71\"), weight 89.4 kg (197 lb), SpO2 99 %.    Physical Exam     General Appearance:    Alert, cooperative, no distress, appears stated age   Head:    Normocephalic, without obvious abnormality, atraumatic   Eyes:    PERRL, conjunctiva/corneas clear, EOM's intact   Ears:    Normal TM's and external ear canals, both ears   Nose:   Nares normal, septum midline, mucosa normal, no drainage   or sinus tenderness   Throat:   Lips, mucosa, and tongue normal; teeth and gums normal   Neck:   Supple, symmetrical, trachea midline, no adenopathy;        thyroid:  No enlargement/tenderness/nodules; no carotid    bruit or JVD   Back:     Symmetric, no curvature, ROM normal, no CVA tenderness   Lungs:     Clear to auscultation bilaterally, respirations unlabored   Chest wall:    No tenderness or deformity   Heart:    Regular rate and rhythm, S1 and S2 normal, no murmur,        rub or gallop   Abdomen:     Soft, non-tender, bowel sounds active all four quadrants,     no masses, no " organomegaly   Extremities:   Extremities normal, atraumatic, no cyanosis or edema   Pulses:   2+ and symmetric all extremities   Skin:   Skin color, texture, turgor normal, no rashes or lesions   Lymph nodes:   Cervical, supraclavicular, and axillary nodes normal   Neurologic:   CNII-XII intact. Normal strength, sensation and reflexes       throughout      Results for orders placed or performed in visit on 08/08/18   Comprehensive Metabolic Panel   Result Value Ref Range    Glucose 113 (H) 74 - 98 mg/dL    BUN 23 (H) 7 - 20 mg/dL    Creatinine 1.10 0.60 - 1.30 mg/dL    eGFR Non African Am 64 >60 mL/min/1.73    eGFR African Am 77 >60 mL/min/1.73    BUN/Creatinine Ratio 20.9 6.3 - 21.9    Sodium 141 137 - 145 mmol/L    Potassium 4.4 3.5 - 5.1 mmol/L    Chloride 103 98 - 107 mmol/L    Total CO2 29.0 26.0 - 30.0 mmol/L    Calcium 9.6 8.4 - 10.2 mg/dL    Total Protein 6.7 6.3 - 8.2 g/dL    Albumin 4.00 3.50 - 5.00 g/dL    Globulin 2.7 gm/dL    A/G Ratio 1.5 1.0 - 2.0 g/dL    Total Bilirubin 0.4 0.2 - 1.3 mg/dL    Alkaline Phosphatase 75 38 - 126 U/L    AST (SGOT) 32 15 - 46 U/L    ALT (SGPT) 31 13 - 69 U/L   Sedimentation Rate   Result Value Ref Range    Sed Rate 20 (H) 0 - 15 mm/hr   Myoglobin, Serum   Result Value Ref Range    Myoglobin 71.2 0.0 - 101.0 ng/mL   CK   Result Value Ref Range    Creatine Kinase 82 30 - 170 U/L   [place] Spotted Fever, IgM   Result Value Ref Range    [facility] 0.25 0.00 - 0.89 index   [place] Spotted Fever, IgG   Result Value Ref Range    RMSF IgG Negative Negative   CBC & Differential   Result Value Ref Range    WBC 6.35 4.80 - 10.80 10*3/mm3    RBC 4.15 (L) 4.70 - 6.10 10*6/mm3    Hemoglobin 12.2 (L) 14.0 - 18.0 g/dL    Hematocrit 37.6 (L) 42.0 - 52.0 %    MCV 90.6 80.0 - 94.0 fL    MCH 29.4 27.0 - 31.0 pg    MCHC 32.4 30.0 - 37.0 g/dL    RDW 13.8 11.5 - 14.5 %    Platelets 172 130 - 400 10*3/mm3    Neutrophil Rel % 53.0 37.0 - 80.0 %    Lymphocyte Rel % 30.7 10.0 - 50.0 %     Monocyte Rel % 12.3 (H) 0.0 - 12.0 %    Eosinophil Rel % 3.1 0.0 - 7.0 %    Basophil Rel % 0.6 0.0 - 2.5 %    Neutrophils Absolute 3.36 2.00 - 6.90 10*3/mm3    Lymphocytes Absolute 1.95 0.60 - 3.40 10*3/mm3    Monocytes Absolute 0.78 0.00 - 0.90 10*3/mm3    Eosinophils Absolute 0.20 0.00 - 0.70 10*3/mm3    Basophils Absolute 0.04 0.00 - 0.20 10*3/mm3    Immature Granulocyte Rel % 0.3 0.0 - 0.6 %    Immature Grans Absolute 0.02 0.00 - 0.06 10*3/mm3    nRBC 0.0 0.0 - 0.0 /100 WBC         Assessment/Plan     Pt not sure on what meds he is taking.  Pt weak and dizzy.   Will need to see if on losartan and avapro.    Pt will start going to the [facility].          Sukumar was seen today for dizziness and hypertension.    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  -     amoxicillin-clavulanate (AUGMENTIN) 875-125 MG per tablet; Take 1 tablet by mouth Every 12 (Twelve) Hours.      Return in about 6 weeks (around 1/21/2019).          There are no Patient Instructions on file for this visit.      Yobany Nolasco MD    Assessment/Plan

## 2019-01-21 ENCOUNTER — OFFICE VISIT (OUTPATIENT)
Dept: INTERNAL MEDICINE | Facility: CLINIC | Age: 84
End: 2019-01-21

## 2019-01-21 VITALS
HEIGHT: 71 IN | WEIGHT: 201.5 LBS | TEMPERATURE: 97.3 F | DIASTOLIC BLOOD PRESSURE: 80 MMHG | BODY MASS INDEX: 28.21 KG/M2 | HEART RATE: 66 BPM | RESPIRATION RATE: 17 BRPM | SYSTOLIC BLOOD PRESSURE: 156 MMHG | OXYGEN SATURATION: 99 %

## 2019-01-21 DIAGNOSIS — M33.20 POLYMYOSITIS (HCC): ICD-10-CM

## 2019-01-21 DIAGNOSIS — I10 ESSENTIAL HYPERTENSION: Primary | ICD-10-CM

## 2019-01-21 DIAGNOSIS — N18.2 STAGE 2 CHRONIC KIDNEY DISEASE: ICD-10-CM

## 2019-01-21 DIAGNOSIS — R42 DIZZINESS: ICD-10-CM

## 2019-01-21 LAB
ALBUMIN SERPL-MCNC: 4.6 G/DL (ref 3.5–5)
ALBUMIN/GLOB SERPL: 1.7 G/DL (ref 1–2)
ALP SERPL-CCNC: 81 U/L (ref 38–126)
ALT SERPL-CCNC: 33 U/L (ref 13–69)
AST SERPL-CCNC: 31 U/L (ref 15–46)
BASOPHILS # BLD AUTO: 0.04 10*3/MM3 (ref 0–0.2)
BASOPHILS NFR BLD AUTO: 0.6 % (ref 0–2.5)
BILIRUB SERPL-MCNC: 0.7 MG/DL (ref 0.2–1.3)
BUN SERPL-MCNC: 22 MG/DL (ref 7–20)
BUN/CREAT SERPL: 22 (ref 6.3–21.9)
CALCIUM SERPL-MCNC: 10 MG/DL (ref 8.4–10.2)
CHLORIDE SERPL-SCNC: 100 MMOL/L (ref 98–107)
CHOLEST SERPL-MCNC: 200 MG/DL (ref 0–199)
CK SERPL-CCNC: 47 U/L (ref 30–170)
CO2 SERPL-SCNC: 29 MMOL/L (ref 26–30)
CREAT SERPL-MCNC: 1 MG/DL (ref 0.6–1.3)
EOSINOPHIL # BLD AUTO: 0.2 10*3/MM3 (ref 0–0.7)
EOSINOPHIL NFR BLD AUTO: 3 % (ref 0–7)
ERYTHROCYTE [DISTWIDTH] IN BLOOD BY AUTOMATED COUNT: 14 % (ref 11.5–14.5)
GLOBULIN SER CALC-MCNC: 2.7 GM/DL
GLUCOSE SERPL-MCNC: 105 MG/DL (ref 74–98)
HCT VFR BLD AUTO: 38.6 % (ref 42–52)
HDLC SERPL-MCNC: 47 MG/DL (ref 40–60)
HGB BLD-MCNC: 13 G/DL (ref 14–18)
IMM GRANULOCYTES # BLD AUTO: 0.01 10*3/MM3 (ref 0–0.06)
IMM GRANULOCYTES NFR BLD AUTO: 0.1 % (ref 0–0.6)
LDLC SERPL CALC-MCNC: 113 MG/DL (ref 0–99)
LYMPHOCYTES # BLD AUTO: 2.01 10*3/MM3 (ref 0.6–3.4)
LYMPHOCYTES NFR BLD AUTO: 30.1 % (ref 10–50)
MCH RBC QN AUTO: 30 PG (ref 27–31)
MCHC RBC AUTO-ENTMCNC: 33.7 G/DL (ref 30–37)
MCV RBC AUTO: 88.9 FL (ref 80–94)
MONOCYTES # BLD AUTO: 0.69 10*3/MM3 (ref 0–0.9)
MONOCYTES NFR BLD AUTO: 10.3 % (ref 0–12)
MYOGLOBIN SERPL-MCNC: 64.3 NG/ML (ref 0–101)
NEUTROPHILS # BLD AUTO: 3.73 10*3/MM3 (ref 2–6.9)
NEUTROPHILS NFR BLD AUTO: 55.9 % (ref 37–80)
NRBC BLD AUTO-RTO: 0 /100 WBC (ref 0–0)
PLATELET # BLD AUTO: 210 10*3/MM3 (ref 130–400)
POTASSIUM SERPL-SCNC: 4.7 MMOL/L (ref 3.5–5.1)
PROT SERPL-MCNC: 7.3 G/DL (ref 6.3–8.2)
RBC # BLD AUTO: 4.34 10*6/MM3 (ref 4.7–6.1)
SODIUM SERPL-SCNC: 139 MMOL/L (ref 137–145)
T4 FREE SERPL-MCNC: 1.17 NG/DL (ref 0.78–2.19)
TRIGL SERPL-MCNC: 198 MG/DL
TSH SERPL DL<=0.005 MIU/L-ACNC: 2.09 MIU/ML (ref 0.47–4.68)
VIT B12 SERPL-MCNC: 363 PG/ML (ref 239–931)
VLDLC SERPL CALC-MCNC: 39.6 MG/DL
WBC # BLD AUTO: 6.68 10*3/MM3 (ref 4.8–10.8)

## 2019-01-21 PROCEDURE — 99214 OFFICE O/P EST MOD 30 MIN: CPT | Performed by: INTERNAL MEDICINE

## 2019-01-21 RX ORDER — MECLIZINE HCL 25MG 25 MG/1
25 TABLET, CHEWABLE ORAL EVERY 8 HOURS SCHEDULED
Qty: 30 TABLET | Refills: 4 | Status: SHIPPED | OUTPATIENT
Start: 2019-01-21 | End: 2019-11-11 | Stop reason: SDUPTHER

## 2019-01-21 NOTE — PROGRESS NOTES
Subjective     Patient ID: Sukumar Cosby is a 84 y.o. male. Patient is here for management of multiple medical problems.     Chief Complaint   Patient presents with   • Hypertension     f/u    • Dizziness     f/u still happening and it does not feel much better    • Fatigue     pt feels like he is always tired      Hypertension   This is a chronic problem. The current episode started more than 1 year ago. The problem is unchanged. There are no associated agents to hypertension. Current antihypertension treatment includes angiotensin blockers, calcium channel blockers and diuretics. The current treatment provides moderate improvement. There are no compliance problems.  There is no history of angina, kidney disease or CAD/MI. There is no history of chronic renal disease, coarctation of the aorta or hyperaldosteronism.   Dizziness   This is a chronic problem. Associated symptoms include fatigue. Pertinent negatives include no arthralgias. Nothing aggravates the symptoms. The treatment provided moderate relief.   Fatigue   This is a chronic problem. The current episode started more than 1 year ago. Associated symptoms include fatigue. Pertinent negatives include no arthralgias. He has tried nothing for the symptoms. The treatment provided moderate relief.                   The following portions of the patient's history were reviewed and updated as appropriate: allergies, current medications, past family history, past medical history, past social history, past surgical history and problem list.    Review of Systems   Constitutional: Positive for fatigue.   Musculoskeletal: Negative for arthralgias.   Neurological: Positive for dizziness.       Current Outpatient Medications:   •  acetaminophen (TYLENOL) 650 MG 8 hr tablet, Take 650 mg by mouth Every 8 (Eight) Hours As Needed for Mild Pain ., Disp: , Rfl:   •  albuterol (VENTOLIN HFA) 108 (90 BASE) MCG/ACT inhaler, Ventolin  (90 Base) MCG/ACT Inhalation Aerosol  "Solution; Patient Sig: Ventolin  (90 Base) MCG/ACT Inhalation Aerosol Solution ; 18; 0; 24-May-2013; Active, Disp: , Rfl:   •  allopurinol (ZYLOPRIM) 100 MG tablet, TAKE ONE TABLET BY MOUTH EVERY DAY, Disp: 30 tablet, Rfl: 11  •  amLODIPine (NORVASC) 5 MG tablet, Take 1 tablet by mouth Daily., Disp: 90 tablet, Rfl: 3  •  irbesartan-hydrochlorothiazide (AVALIDE) 150-12.5 MG tablet, Take 1 tablet by mouth Daily., Disp: , Rfl: 3  •  meclizine 25 MG chewable tablet chewable tablet, Chew 1 tablet Every 8 (Eight) Hours., Disp: 30 tablet, Rfl: 4  •  Multiple Vitamins-Minerals (HAIR/SKIN/NAILS/BIOTIN) tablet, Take  by mouth Daily., Disp: , Rfl:     Objective      Blood pressure 156/80, pulse 66, temperature 97.3 °F (36.3 °C), temperature source Temporal, resp. rate 17, height 180.3 cm (70.98\"), weight 91.4 kg (201 lb 8 oz), SpO2 99 %.    Physical Exam     General Appearance:    Alert, cooperative, no distress, appears stated age   Head:    Normocephalic, without obvious abnormality, atraumatic   Eyes:    + nystagmus. PERRL, conjunctiva/corneas clear, EOM's intact   Ears:    Normal TM's and external ear canals, both ears   Nose:   Nares normal, septum midline, mucosa normal, no drainage   or sinus tenderness   Throat:   Lips, mucosa, and tongue normal; teeth and gums normal   Neck:   Supple, symmetrical, trachea midline, no adenopathy;        thyroid:  No enlargement/tenderness/nodules; no carotid    bruit or JVD   Back:     Symmetric, no curvature, ROM normal, no CVA tenderness   Lungs:     Clear to auscultation bilaterally, respirations unlabored   Chest wall:    No tenderness or deformity   Heart:    Regular rate and rhythm, S1 and S2 normal, no murmur,        rub or gallop   Abdomen:     Soft, non-tender, bowel sounds active all four quadrants,     no masses, no organomegaly   Extremities:   Extremities normal, atraumatic, no cyanosis or edema   Pulses:   2+ and symmetric all extremities   Skin:   Skin color, " texture, turgor normal, no rashes or lesions   Lymph nodes:   Cervical, supraclavicular, and axillary nodes normal   Neurologic:   CNII-XII intact. Normal strength, sensation and reflexes       Throughout. + nystagmus.         Results for orders placed or performed in visit on 08/08/18   Comprehensive Metabolic Panel   Result Value Ref Range    Glucose 113 (H) 74 - 98 mg/dL    BUN 23 (H) 7 - 20 mg/dL    Creatinine 1.10 0.60 - 1.30 mg/dL    eGFR Non African Am 64 >60 mL/min/1.73    eGFR African Am 77 >60 mL/min/1.73    BUN/Creatinine Ratio 20.9 6.3 - 21.9    Sodium 141 137 - 145 mmol/L    Potassium 4.4 3.5 - 5.1 mmol/L    Chloride 103 98 - 107 mmol/L    Total CO2 29.0 26.0 - 30.0 mmol/L    Calcium 9.6 8.4 - 10.2 mg/dL    Total Protein 6.7 6.3 - 8.2 g/dL    Albumin 4.00 3.50 - 5.00 g/dL    Globulin 2.7 gm/dL    A/G Ratio 1.5 1.0 - 2.0 g/dL    Total Bilirubin 0.4 0.2 - 1.3 mg/dL    Alkaline Phosphatase 75 38 - 126 U/L    AST (SGOT) 32 15 - 46 U/L    ALT (SGPT) 31 13 - 69 U/L   Sedimentation Rate   Result Value Ref Range    Sed Rate 20 (H) 0 - 15 mm/hr   Myoglobin, Serum   Result Value Ref Range    Myoglobin 71.2 0.0 - 101.0 ng/mL   CK   Result Value Ref Range    Creatine Kinase 82 30 - 170 U/L   [place] Spotted Fever, IgM   Result Value Ref Range    [facility] IgM 0.25 0.00 - 0.89 index   [facility] Spotted Fever, IgG   Result Value Ref Range    RMS IgG Negative Negative   CBC & Differential   Result Value Ref Range    WBC 6.35 4.80 - 10.80 10*3/mm3    RBC 4.15 (L) 4.70 - 6.10 10*6/mm3    Hemoglobin 12.2 (L) 14.0 - 18.0 g/dL    Hematocrit 37.6 (L) 42.0 - 52.0 %    MCV 90.6 80.0 - 94.0 fL    MCH 29.4 27.0 - 31.0 pg    MCHC 32.4 30.0 - 37.0 g/dL    RDW 13.8 11.5 - 14.5 %    Platelets 172 130 - 400 10*3/mm3    Neutrophil Rel % 53.0 37.0 - 80.0 %    Lymphocyte Rel % 30.7 10.0 - 50.0 %    Monocyte Rel % 12.3 (H) 0.0 - 12.0 %    Eosinophil Rel % 3.1 0.0 - 7.0 %    Basophil Rel % 0.6 0.0 - 2.5 %    Neutrophils Absolute 3.36  2.00 - 6.90 10*3/mm3    Lymphocytes Absolute 1.95 0.60 - 3.40 10*3/mm3    Monocytes Absolute 0.78 0.00 - 0.90 10*3/mm3    Eosinophils Absolute 0.20 0.00 - 0.70 10*3/mm3    Basophils Absolute 0.04 0.00 - 0.20 10*3/mm3    Immature Granulocyte Rel % 0.3 0.0 - 0.6 %    Immature Grans Absolute 0.02 0.00 - 0.06 10*3/mm3    nRBC 0.0 0.0 - 0.0 /100 WBC         Assessment/Plan   Pt not sure what meds he is taking.  Also needs labs done.      Needs lab done.    bp elevated. Not sure if on meds.          Sukumar was seen today for hypertension, dizziness and fatigue.    Diagnoses and all orders for this visit:    Essential hypertension  -     Lipid Panel  -     CBC & Differential  -     Vitamin B12  -     Comprehensive Metabolic Panel  -     TSH  -     T4, Free  -     CK  -     Myoglobin, Serum    Polymyositis (CMS/HCC)  -     Lipid Panel  -     CBC & Differential  -     Vitamin B12  -     Comprehensive Metabolic Panel  -     TSH  -     T4, Free  -     CK  -     Myoglobin, Serum    Stage 2 chronic kidney disease  -     Lipid Panel  -     CBC & Differential  -     Vitamin B12  -     Comprehensive Metabolic Panel  -     TSH  -     T4, Free  -     CK  -     Myoglobin, Serum    Dizziness  -     Ambulatory Referral to Physical Therapy Evaluate and treat  -     Lipid Panel  -     CBC & Differential  -     Vitamin B12  -     Comprehensive Metabolic Panel  -     TSH  -     T4, Free  -     CK  -     Myoglobin, Serum    Other orders  -     meclizine 25 MG chewable tablet chewable tablet; Chew 1 tablet Every 8 (Eight) Hours.      Return in about 4 weeks (around 2/18/2019).          There are no Patient Instructions on file for this visit.     Yobany Nolasco MD    Assessment/Plan

## 2019-02-07 ENCOUNTER — TREATMENT (OUTPATIENT)
Dept: PHYSICAL THERAPY | Facility: CLINIC | Age: 84
End: 2019-02-07

## 2019-02-07 DIAGNOSIS — R42 DIZZINESS: ICD-10-CM

## 2019-02-07 DIAGNOSIS — R42 VERTIGO: Primary | ICD-10-CM

## 2019-02-07 PROCEDURE — 97162 PT EVAL MOD COMPLEX 30 MIN: CPT | Performed by: PHYSICAL THERAPIST

## 2019-02-07 PROCEDURE — 97116 GAIT TRAINING THERAPY: CPT | Performed by: PHYSICAL THERAPIST

## 2019-02-07 PROCEDURE — 97110 THERAPEUTIC EXERCISES: CPT | Performed by: PHYSICAL THERAPIST

## 2019-02-07 NOTE — PROGRESS NOTES
Physical Therapy Initial Evaluation and Plan of Care      Patient: Sukumar Cosby   : 1934  Diagnosis/ICD-10 Code:  Vertigo [R42]  Referring practitioner: Yobany Nolasco MD    Subjective Evaluation    History of Present Illness  Mechanism of injury: Describe your dizziness: feels like he is rocking on a boat    Do you take medication for your blood pressure?: yes, as well as pain medication, vertigo medication which has not helped.     When did this issue begin?: about 2 years ago     Frequency of symptoms: Constant when walking, feels like his body tires out the more he walks     How long does symptoms last: As long as he is walking     Have you fallen because of dizziness? No    Do any specific positions or activities bring the symptoms on?: when standing up and walking from sitting     States both feet are numb, has been for several years. Has trouble feeling pedal.     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            Objective       Active Range of Motion     Additional Active Range of Motion Details  WFL for age range     Functional Assessment     Comments  Vestibular testing    [place]: N/A    Gaze stabilization: L<>R difficult, nystagmus present     Smooth pursuit H-formation: Difficulty with L<>R, nystagmus mild     VOR head shake:  To Right: No onset of symptoms   To Left: No onset of symptoms     Head shake test:  No onset of symptoms   Head stable with torso movement: no onset no onset of symptoms     [place] step test: N/A    Static balance:  Feet together eyes open: 30 sec   Feet together eyes closed: 25 sec   Tandem stance right font: 15 sec, difficulty assuming position  Tandem stance left front:  15 sec, difficulty assuming position  SLS right: <3 sec, difficulty assuming position  SLS left: 3 sec, difficulty assuming position    Dizziness onset with eyes closed     Walking balance  Walking with head up/down: Mild onset of symptoms, gait slows but no missteps   Walking with head left/right: Patient has difficulty walking and turning head at same time, several missteps, mild onset of symptoms.            Assessment & Plan     Assessment  Impairments: abnormal coordination, abnormal gait, activity intolerance, impaired balance, impaired physical strength and lacks appropriate home exercise program  Assessment details: Patient is an 84 year old male presenting with complaints of dizziness with walking. Patient presents with severely decreased static and dynamic balance, difficulty with horizontal gaze stability, and decreased coordination. Patient is appropriate for physical therapy to address the above issues.   Prognosis: good  Prognosis details: Short Term Goals (2 weeks):  1. Patient will be independent with home exercise program.  2. Patient will demonstrate improved static balance by 50%.    Long Term Goals (6 weeks):  1. Patient will be demonstrate good dynamic gait stability without misstep for at least 50 feet.   2. Patient will reports improved dizziness symptoms by at least 50%.       Functional Limitations: walking and standing  Plan  Therapy options: will be seen for skilled physical therapy services  Planned modality interventions: thermotherapy (hydrocollator packs), cryotherapy, high voltage pulsed current (pain management) and high voltage pulsed current (spasm management)  Planned therapy interventions: therapeutic activities, stretching, strengthening, spinal/joint mobilization, soft tissue mobilization, postural training, manual therapy, motor coordination training, neuromuscular re-education, joint mobilization, IADL retraining, home exercise program, gait training, functional ROM  exercises, flexibility, fine motor coordination training, body mechanics training, balance/weight-bearing training, ADL retraining and abdominal trunk stabilization  Frequency: 2-3x/week.  Duration in visits: 12  Treatment plan discussed with: patient        Manual Therapy:         mins  23724;  Therapeutic Exercise:         mins  75611;     Neuromuscular Anne:   10     mins  06592;    Therapeutic Activity:          mins  69196;     Gait Training:      15     mins  40790;     Ultrasound:          mins  77835;    Electrical Stimulation:         mins  69237 ( );  Dry Needling          mins self-pay    Timed Treatment:   25   mins   Total Treatment:     53   mins    PT SIGNATURE: Jenni Mason, EMMA   DATE TREATMENT INITIATED: 2/7/2019    Medicare Initial Certification  Certification Period: 5/8/2019  I certify that the therapy services are furnished while this patient is under my care.  The services outlined above are required by this patient, and will be reviewed every 90 days.     PHYSICIAN: Yobany Nolasco MD      DATE:     Please sign and return via fax to [phone].. Thank you, [facility].

## 2019-03-21 ENCOUNTER — OFFICE VISIT (OUTPATIENT)
Dept: INTERNAL MEDICINE | Facility: CLINIC | Age: 84
End: 2019-03-21

## 2019-03-21 VITALS
WEIGHT: 200.8 LBS | DIASTOLIC BLOOD PRESSURE: 87 MMHG | RESPIRATION RATE: 16 BRPM | SYSTOLIC BLOOD PRESSURE: 149 MMHG | OXYGEN SATURATION: 100 % | HEIGHT: 70 IN | BODY MASS INDEX: 28.75 KG/M2 | TEMPERATURE: 97.7 F | HEART RATE: 70 BPM

## 2019-03-21 DIAGNOSIS — I10 ESSENTIAL HYPERTENSION: Primary | ICD-10-CM

## 2019-03-21 DIAGNOSIS — M33.20 POLYMYOSITIS (HCC): ICD-10-CM

## 2019-03-21 DIAGNOSIS — R53.1 WEAKNESS: ICD-10-CM

## 2019-03-21 DIAGNOSIS — E53.8 LOW VITAMIN B12 LEVEL: ICD-10-CM

## 2019-03-21 DIAGNOSIS — R06.09 DOE (DYSPNEA ON EXERTION): ICD-10-CM

## 2019-03-21 PROCEDURE — 99214 OFFICE O/P EST MOD 30 MIN: CPT | Performed by: INTERNAL MEDICINE

## 2019-03-21 RX ORDER — CHOLECALCIFEROL (VITAMIN D3) 125 MCG
1 CAPSULE ORAL DAILY
Qty: 90 TABLET | Refills: 3 | Status: SHIPPED | OUTPATIENT
Start: 2019-03-21 | End: 2020-09-18

## 2019-03-21 RX ORDER — IRBESARTAN AND HYDROCHLOROTHIAZIDE 300; 12.5 MG/1; MG/1
1 TABLET, FILM COATED ORAL DAILY
Qty: 90 TABLET | Refills: 3 | Status: SHIPPED | OUTPATIENT
Start: 2019-03-21 | End: 2019-09-20 | Stop reason: SDUPTHER

## 2019-03-21 NOTE — PROGRESS NOTES
Subjective     Patient ID: Sukumar Cosby is a 85 y.o. male. Patient is here for management of multiple medical problems.     Chief Complaint   Patient presents with   • Hypertension     follow-up, patient unsure about which blood pressure medications he should be taking , Carvedilol or Amlodipine.     Hypertension   This is a chronic problem. The current episode started more than 1 year ago. The problem has been waxing and waning since onset. The problem is controlled. Pertinent negatives include no anxiety, blurred vision, chest pain or headaches. There are no associated agents to hypertension. Past treatments include nothing. Current antihypertension treatment includes angiotensin blockers, diuretics and calcium channel blockers. The current treatment provides moderate improvement. There is no history of angina, kidney disease or CAD/MI. There is no history of chronic renal disease or hyperaldosteronism.        Weakness no better.        The following portions of the patient's history were reviewed and updated as appropriate: allergies, current medications, past family history, past medical history, past social history, past surgical history and problem list.    Review of Systems   Constitutional: Positive for fatigue.   Eyes: Negative for blurred vision.   Cardiovascular: Negative for chest pain.   Neurological: Positive for weakness. Negative for headaches.   Psychiatric/Behavioral: Negative for self-injury and sleep disturbance. The patient is not nervous/anxious.    All other systems reviewed and are negative.      Current Outpatient Medications:   •  acetaminophen (TYLENOL) 650 MG 8 hr tablet, Take 650 mg by mouth Every 8 (Eight) Hours As Needed for Mild Pain ., Disp: , Rfl:   •  albuterol (VENTOLIN HFA) 108 (90 BASE) MCG/ACT inhaler, Ventolin  (90 Base) MCG/ACT Inhalation Aerosol Solution; Patient Sig: Ventolin  (90 Base) MCG/ACT Inhalation Aerosol Solution ; 18; 0; 24-May-2013; Active, Disp: ,  "Rfl:   •  allopurinol (ZYLOPRIM) 100 MG tablet, TAKE ONE TABLET BY MOUTH EVERY DAY, Disp: 30 tablet, Rfl: 11  •  meclizine 25 MG chewable tablet chewable tablet, Chew 1 tablet Every 8 (Eight) Hours., Disp: 30 tablet, Rfl: 4  •  Multiple Vitamins-Minerals (HAIR/SKIN/NAILS/BIOTIN) tablet, Take  by mouth Daily., Disp: , Rfl:   •  amLODIPine (NORVASC) 5 MG tablet, Take 1 tablet by mouth Daily., Disp: 90 tablet, Rfl: 3  •  B-12, Methylcobalamin, 1000 MCG sublingual tablet, Place 1 tablet under the tongue Daily., Disp: 90 tablet, Rfl: 3  •  irbesartan-hydrochlorothiazide (AVALIDE) 300-12.5 MG tablet, Take 1 tablet by mouth Daily., Disp: 90 tablet, Rfl: 3    Objective      Blood pressure 149/87, pulse 70, temperature 97.7 °F (36.5 °C), temperature source Oral, resp. rate 16, height 177.8 cm (70\"), weight 91.1 kg (200 lb 12.8 oz), SpO2 100 %.    Physical Exam     General Appearance:    Alert, cooperative, no distress, appears stated age   Head:    Normocephalic, without obvious abnormality, atraumatic   Eyes:    PERRL, conjunctiva/corneas clear, EOM's intact   Ears:    Normal TM's and external ear canals, both ears   Nose:   Nares normal, septum midline, mucosa normal, no drainage   or sinus tenderness   Throat:   Lips, mucosa, and tongue normal; teeth and gums normal   Neck:   Supple, symmetrical, trachea midline, no adenopathy;        thyroid:  No enlargement/tenderness/nodules; no carotid    bruit or JVD   Back:     Symmetric, no curvature, ROM normal, no CVA tenderness   Lungs:     Clear to auscultation bilaterally, respirations unlabored   Chest wall:    No tenderness or deformity   Heart:    Regular rate and rhythm, S1 and S2 normal, no murmur,        rub or gallop   Abdomen:     Soft, non-tender, bowel sounds active all four quadrants,     no masses, no organomegaly   Extremities:   Extremities normal, atraumatic, no cyanosis or edema   Pulses:   2+ and symmetric all extremities   Skin:   Skin color, texture, turgor " normal, no rashes or lesions   Lymph nodes:   Cervical, supraclavicular, and axillary nodes normal   Neurologic:   CNII-XII intact. Normal strength, sensation and reflexes       throughout      Results for orders placed or performed in visit on 01/21/19   Lipid Panel   Result Value Ref Range    Total Cholesterol 200 (H) 0 - 199 mg/dL    Triglycerides 198 (H) <150 mg/dL    HDL Cholesterol 47 40 - 60 mg/dL    VLDL Cholesterol 39.6 mg/dL    LDL Cholesterol  113 (H) 0 - 99 mg/dL   Vitamin B12   Result Value Ref Range    Vitamin B-12 363 239 - 931 pg/mL   Comprehensive Metabolic Panel   Result Value Ref Range    Glucose 105 (H) 74 - 98 mg/dL    BUN 22 (H) 7 - 20 mg/dL    Creatinine 1.00 0.60 - 1.30 mg/dL    eGFR Non African Am 71 >60 mL/min/1.73    eGFR African Am 86 >60 mL/min/1.73    BUN/Creatinine Ratio 22.0 (H) 6.3 - 21.9    Sodium 139 137 - 145 mmol/L    Potassium 4.7 3.5 - 5.1 mmol/L    Chloride 100 98 - 107 mmol/L    Total CO2 29.0 26.0 - 30.0 mmol/L    Calcium 10.0 8.4 - 10.2 mg/dL    Total Protein 7.3 6.3 - 8.2 g/dL    Albumin 4.60 3.50 - 5.00 g/dL    Globulin 2.7 gm/dL    A/G Ratio 1.7 1.0 - 2.0 g/dL    Total Bilirubin 0.7 0.2 - 1.3 mg/dL    Alkaline Phosphatase 81 38 - 126 U/L    AST (SGOT) 31 15 - 46 U/L    ALT (SGPT) 33 13 - 69 U/L   TSH   Result Value Ref Range    TSH 2.090 0.470 - 4.680 mIU/mL   T4, Free   Result Value Ref Range    Free T4 1.17 0.78 - 2.19 ng/dL   CK   Result Value Ref Range    Creatine Kinase 47 30 - 170 U/L   Myoglobin, Serum   Result Value Ref Range    Myoglobin 64.3 0.0 - 101.0 ng/mL   CBC & Differential   Result Value Ref Range    WBC 6.68 4.80 - 10.80 10*3/mm3    RBC 4.34 (L) 4.70 - 6.10 10*6/mm3    Hemoglobin 13.0 (L) 14.0 - 18.0 g/dL    Hematocrit 38.6 (L) 42.0 - 52.0 %    MCV 88.9 80.0 - 94.0 fL    MCH 30.0 27.0 - 31.0 pg    MCHC 33.7 30.0 - 37.0 g/dL    RDW 14.0 11.5 - 14.5 %    Platelets 210 130 - 400 10*3/mm3    Neutrophil Rel % 55.9 37.0 - 80.0 %    Lymphocyte Rel % 30.1 10.0  - 50.0 %    Monocyte Rel % 10.3 0.0 - 12.0 %    Eosinophil Rel % 3.0 0.0 - 7.0 %    Basophil Rel % 0.6 0.0 - 2.5 %    Neutrophils Absolute 3.73 2.00 - 6.90 10*3/mm3    Lymphocytes Absolute 2.01 0.60 - 3.40 10*3/mm3    Monocytes Absolute 0.69 0.00 - 0.90 10*3/mm3    Eosinophils Absolute 0.20 0.00 - 0.70 10*3/mm3    Basophils Absolute 0.04 0.00 - 0.20 10*3/mm3    Immature Granulocyte Rel % 0.1 0.0 - 0.6 %    Immature Grans Absolute 0.01 0.00 - 0.06 10*3/mm3    nRBC 0.0 0.0 - 0.0 /100 WBC         Assessment/Plan       Ray was seen today for hypertension.    Diagnoses and all orders for this visit:    Essential hypertension  -     irbesartan-hydrochlorothiazide (AVALIDE) 300-12.5 MG tablet; Take 1 tablet by mouth Daily.  -     TSH  -     T4, Free  -     Comprehensive Metabolic Panel  -     Vitamin B12  -     CBC & Differential  -     Lipid Panel  -     Uric Acid  -     Myoglobin, Serum  -     CK    Polymyositis (CMS/HCC)  -     TSH  -     T4, Free  -     Comprehensive Metabolic Panel  -     Vitamin B12  -     CBC & Differential  -     Lipid Panel  -     Uric Acid  -     Myoglobin, Serum  -     CK    Low vitamin B12 level  -     B-12, Methylcobalamin, 1000 MCG sublingual tablet; Place 1 tablet under the tongue Daily.      Return in about 3 months (around 6/21/2019).          There are no Patient Instructions on file for this visit.     Yobany Nolasco MD    Assessment/Plan

## 2019-03-22 LAB
ALBUMIN SERPL-MCNC: 4.4 G/DL (ref 3.5–5)
ALBUMIN/GLOB SERPL: 1.6 G/DL (ref 1–2)
ALP SERPL-CCNC: 92 U/L (ref 38–126)
ALT SERPL-CCNC: 54 U/L (ref 13–69)
AST SERPL-CCNC: 49 U/L (ref 15–46)
BASOPHILS # BLD AUTO: 0.05 10*3/MM3 (ref 0–0.2)
BASOPHILS NFR BLD AUTO: 0.6 % (ref 0–2.5)
BILIRUB SERPL-MCNC: 0.4 MG/DL (ref 0.2–1.3)
BUN SERPL-MCNC: 23 MG/DL (ref 7–20)
BUN/CREAT SERPL: 20.9 (ref 6.3–21.9)
CALCIUM SERPL-MCNC: 10.3 MG/DL (ref 8.4–10.2)
CHLORIDE SERPL-SCNC: 101 MMOL/L (ref 98–107)
CHOLEST SERPL-MCNC: 190 MG/DL (ref 0–199)
CK SERPL-CCNC: 62 U/L (ref 30–170)
CO2 SERPL-SCNC: 28 MMOL/L (ref 26–30)
CREAT SERPL-MCNC: 1.1 MG/DL (ref 0.6–1.3)
EOSINOPHIL # BLD AUTO: 0.19 10*3/MM3 (ref 0–0.7)
EOSINOPHIL NFR BLD AUTO: 2.4 % (ref 0–7)
ERYTHROCYTE [DISTWIDTH] IN BLOOD BY AUTOMATED COUNT: 13.7 % (ref 11.5–14.5)
GLOBULIN SER CALC-MCNC: 2.8 GM/DL
GLUCOSE SERPL-MCNC: 99 MG/DL (ref 74–98)
HCT VFR BLD AUTO: 40.9 % (ref 42–52)
HDLC SERPL-MCNC: 47 MG/DL (ref 40–60)
HGB BLD-MCNC: 13.5 G/DL (ref 14–18)
IMM GRANULOCYTES # BLD AUTO: 0.02 10*3/MM3 (ref 0–0.06)
IMM GRANULOCYTES NFR BLD AUTO: 0.3 % (ref 0–0.6)
LDLC SERPL CALC-MCNC: 111 MG/DL (ref 0–99)
LYMPHOCYTES # BLD AUTO: 2.18 10*3/MM3 (ref 0.6–3.4)
LYMPHOCYTES NFR BLD AUTO: 27.9 % (ref 10–50)
MCH RBC QN AUTO: 29.3 PG (ref 27–31)
MCHC RBC AUTO-ENTMCNC: 33 G/DL (ref 30–37)
MCV RBC AUTO: 88.9 FL (ref 80–94)
MONOCYTES # BLD AUTO: 0.77 10*3/MM3 (ref 0–0.9)
MONOCYTES NFR BLD AUTO: 9.8 % (ref 0–12)
MYOGLOBIN SERPL-MCNC: 59.7 NG/ML (ref 0–101)
NEUTROPHILS # BLD AUTO: 4.61 10*3/MM3 (ref 2–6.9)
NEUTROPHILS NFR BLD AUTO: 59 % (ref 37–80)
NRBC BLD AUTO-RTO: 0 /100 WBC (ref 0–0)
PLATELET # BLD AUTO: 219 10*3/MM3 (ref 130–400)
POTASSIUM SERPL-SCNC: 5.1 MMOL/L (ref 3.5–5.1)
PROT SERPL-MCNC: 7.2 G/DL (ref 6.3–8.2)
RBC # BLD AUTO: 4.6 10*6/MM3 (ref 4.7–6.1)
SODIUM SERPL-SCNC: 139 MMOL/L (ref 137–145)
T4 FREE SERPL-MCNC: 1.16 NG/DL (ref 0.78–2.19)
TRIGL SERPL-MCNC: 158 MG/DL
TSH SERPL DL<=0.005 MIU/L-ACNC: 1.75 MIU/ML (ref 0.47–4.68)
URATE SERPL-MCNC: 7.3 MG/DL (ref 2.5–8.5)
VIT B12 SERPL-MCNC: 399 PG/ML (ref 239–931)
VLDLC SERPL CALC-MCNC: 31.6 MG/DL
WBC # BLD AUTO: 7.82 10*3/MM3 (ref 4.8–10.8)

## 2019-03-22 NOTE — PROGRESS NOTES
Please let them know the labs look good. Will need to do more to figure out the arthritis.  Try Osteo bi flex otc 2 pills a day and vit c 500 mg a day.

## 2019-06-21 ENCOUNTER — OFFICE VISIT (OUTPATIENT)
Dept: INTERNAL MEDICINE | Facility: CLINIC | Age: 84
End: 2019-06-21

## 2019-06-21 VITALS
BODY MASS INDEX: 26.88 KG/M2 | DIASTOLIC BLOOD PRESSURE: 69 MMHG | HEART RATE: 66 BPM | OXYGEN SATURATION: 100 % | HEIGHT: 71 IN | RESPIRATION RATE: 16 BRPM | WEIGHT: 192 LBS | SYSTOLIC BLOOD PRESSURE: 122 MMHG | TEMPERATURE: 97.8 F

## 2019-06-21 DIAGNOSIS — G60.9 IDIOPATHIC PERIPHERAL NEUROPATHY: ICD-10-CM

## 2019-06-21 DIAGNOSIS — N18.2 STAGE 2 CHRONIC KIDNEY DISEASE: ICD-10-CM

## 2019-06-21 DIAGNOSIS — E55.9 VITAMIN D DEFICIENCY: ICD-10-CM

## 2019-06-21 DIAGNOSIS — R53.83 FATIGUE, UNSPECIFIED TYPE: ICD-10-CM

## 2019-06-21 DIAGNOSIS — R53.1 WEAKNESS: ICD-10-CM

## 2019-06-21 DIAGNOSIS — M1A.09X0 CHRONIC GOUT OF MULTIPLE SITES, UNSPECIFIED CAUSE: ICD-10-CM

## 2019-06-21 DIAGNOSIS — M54.16 LUMBAR RADICULOPATHY: ICD-10-CM

## 2019-06-21 DIAGNOSIS — I10 ESSENTIAL HYPERTENSION: Primary | ICD-10-CM

## 2019-06-21 PROCEDURE — 99214 OFFICE O/P EST MOD 30 MIN: CPT | Performed by: INTERNAL MEDICINE

## 2019-06-21 RX ORDER — CARVEDILOL 3.12 MG/1
3.12 TABLET ORAL 2 TIMES DAILY WITH MEALS
Refills: 11 | COMMUNITY
Start: 2019-06-04 | End: 2019-09-27 | Stop reason: SDUPTHER

## 2019-06-21 NOTE — PROGRESS NOTES
Subjective     Patient ID: Sukumar Cosby is a 85 y.o. male. Patient is here for management of multiple medical problems.     Chief Complaint   Patient presents with   • Hypertension     follow-up     Hypertension   This is a chronic problem. The current episode started more than 1 year ago. The problem is unchanged. Pertinent negatives include no chest pain. There are no associated agents to hypertension. Past treatments include ACE inhibitors. Current antihypertension treatment includes angiotensin blockers, diuretics and beta blockers. The current treatment provides moderate improvement. Compliance problems include exercise.  There is no history of angina, kidney disease or CAD/MI. There is no history of chronic renal disease, coarctation of the aorta, hyperaldosteronism or hypercortisolism.      Weak and difficult walking.  extreem fatigue after walking a minimal amount.  Extensive w/u in past.  Neg. Started w/u cardiology and was lost to f/u.      The following portions of the patient's history were reviewed and updated as appropriate: allergies, current medications, past family history, past medical history, past social history, past surgical history and problem list.    Review of Systems   Constitutional: Negative for chills, diaphoresis and fatigue.   Cardiovascular: Negative for chest pain and leg swelling.   Musculoskeletal: Negative for arthralgias, back pain, gait problem and myalgias.   Psychiatric/Behavioral: Negative for agitation, behavioral problems and confusion.   All other systems reviewed and are negative.      Current Outpatient Medications:   •  albuterol (VENTOLIN HFA) 108 (90 BASE) MCG/ACT inhaler, Ventolin  (90 Base) MCG/ACT Inhalation Aerosol Solution; Patient Sig: Ventolin  (90 Base) MCG/ACT Inhalation Aerosol Solution ; 18; 0; 24-May-2013; Active, Disp: , Rfl:   •  allopurinol (ZYLOPRIM) 100 MG tablet, TAKE ONE TABLET BY MOUTH EVERY DAY, Disp: 30 tablet, Rfl: 11  •  amLODIPine  "(NORVASC) 5 MG tablet, Take 1 tablet by mouth Daily., Disp: 90 tablet, Rfl: 3  •  carvedilol (COREG) 3.125 MG tablet, Take 3.125 mg by mouth 2 (Two) Times a Day With Meals., Disp: , Rfl: 11  •  irbesartan-hydrochlorothiazide (AVALIDE) 300-12.5 MG tablet, Take 1 tablet by mouth Daily., Disp: 90 tablet, Rfl: 3  •  meclizine 25 MG chewable tablet chewable tablet, Chew 1 tablet Every 8 (Eight) Hours., Disp: 30 tablet, Rfl: 4  •  B-12, Methylcobalamin, 1000 MCG sublingual tablet, Place 1 tablet under the tongue Daily., Disp: 90 tablet, Rfl: 3    Objective      Blood pressure 122/69, pulse 66, temperature 97.8 °F (36.6 °C), temperature source Oral, resp. rate 16, height 180.3 cm (71\"), weight 87.1 kg (192 lb), SpO2 100 %.    Physical Exam     General Appearance:    Alert, cooperative, no distress, appears stated age   Head:    Normocephalic, without obvious abnormality, atraumatic   Eyes:    PERRL, conjunctiva/corneas clear, EOM's intact   Ears:    Normal TM's and external ear canals, both ears   Nose:   Nares normal, septum midline, mucosa normal, no drainage   or sinus tenderness   Throat:   Lips, mucosa, and tongue normal; teeth and gums normal   Neck:   Supple, symmetrical, trachea midline, no adenopathy;        thyroid:  No enlargement/tenderness/nodules; no carotid    bruit or JVD   Back:     Symmetric, no curvature, ROM normal, no CVA tenderness   Lungs:     Clear to auscultation bilaterally, respirations unlabored   Chest wall:    No tenderness or deformity   Heart:    Regular rate and rhythm, S1 and S2 normal, no murmur,        rub or gallop   Abdomen:     Soft, non-tender, bowel sounds active all four quadrants,     no masses, no organomegaly   Extremities:   Extremities normal, atraumatic, no cyanosis or edema   Pulses:   2+ in right extremities. No pulse in left.     Skin:   Skin color, texture, turgor normal, no rashes or lesions   Lymph nodes:   Cervical, supraclavicular, and axillary nodes normal "   Neurologic:   CNII-XII intact. Normal strength, sensation and reflexes       throughout      Results for orders placed or performed in visit on 03/21/19   TSH   Result Value Ref Range    TSH 1.750 0.470 - 4.680 mIU/mL   T4, Free   Result Value Ref Range    Free T4 1.16 0.78 - 2.19 ng/dL   Comprehensive Metabolic Panel   Result Value Ref Range    Glucose 99 (H) 74 - 98 mg/dL    BUN 23 (H) 7 - 20 mg/dL    Creatinine 1.10 0.60 - 1.30 mg/dL    eGFR Non African Am 64 >60 mL/min/1.73    eGFR African Am 77 >60 mL/min/1.73    BUN/Creatinine Ratio 20.9 6.3 - 21.9    Sodium 139 137 - 145 mmol/L    Potassium 5.1 3.5 - 5.1 mmol/L    Chloride 101 98 - 107 mmol/L    Total CO2 28.0 26.0 - 30.0 mmol/L    Calcium 10.3 (H) 8.4 - 10.2 mg/dL    Total Protein 7.2 6.3 - 8.2 g/dL    Albumin 4.40 3.50 - 5.00 g/dL    Globulin 2.8 gm/dL    A/G Ratio 1.6 1.0 - 2.0 g/dL    Total Bilirubin 0.4 0.2 - 1.3 mg/dL    Alkaline Phosphatase 92 38 - 126 U/L    AST (SGOT) 49 (H) 15 - 46 U/L    ALT (SGPT) 54 13 - 69 U/L   Vitamin B12   Result Value Ref Range    Vitamin B-12 399 239 - 931 pg/mL   Lipid Panel   Result Value Ref Range    Total Cholesterol 190 0 - 199 mg/dL    Triglycerides 158 (H) <150 mg/dL    HDL Cholesterol 47 40 - 60 mg/dL    VLDL Cholesterol 31.6 mg/dL    LDL Cholesterol  111 (H) 0 - 99 mg/dL   Uric Acid   Result Value Ref Range    Uric Acid 7.3 2.5 - 8.5 mg/dL   Myoglobin, Serum   Result Value Ref Range    Myoglobin 59.7 0.0 - 101.0 ng/mL   CK   Result Value Ref Range    Creatine Kinase 62 30 - 170 U/L   CBC & Differential   Result Value Ref Range    WBC 7.82 4.80 - 10.80 10*3/mm3    RBC 4.60 (L) 4.70 - 6.10 10*6/mm3    Hemoglobin 13.5 (L) 14.0 - 18.0 g/dL    Hematocrit 40.9 (L) 42.0 - 52.0 %    MCV 88.9 80.0 - 94.0 fL    MCH 29.3 27.0 - 31.0 pg    MCHC 33.0 30.0 - 37.0 g/dL    RDW 13.7 11.5 - 14.5 %    Platelets 219 130 - 400 10*3/mm3    Neutrophil Rel % 59.0 37.0 - 80.0 %    Lymphocyte Rel % 27.9 10.0 - 50.0 %    Monocyte Rel % 9.8  0.0 - 12.0 %    Eosinophil Rel % 2.4 0.0 - 7.0 %    Basophil Rel % 0.6 0.0 - 2.5 %    Neutrophils Absolute 4.61 2.00 - 6.90 10*3/mm3    Lymphocytes Absolute 2.18 0.60 - 3.40 10*3/mm3    Monocytes Absolute 0.77 0.00 - 0.90 10*3/mm3    Eosinophils Absolute 0.19 0.00 - 0.70 10*3/mm3    Basophils Absolute 0.05 0.00 - 0.20 10*3/mm3    Immature Granulocyte Rel % 0.3 0.0 - 0.6 %    Immature Grans Absolute 0.02 0.00 - 0.06 10*3/mm3    nRBC 0.0 0.0 - 0.0 /100 WBC         Assessment/Plan       Ray was seen today for hypertension.    Diagnoses and all orders for this visit:    Essential hypertension  -     Vitamin B12  -     CBC & Differential  -     Lipid Panel  -     TSH  -     Comprehensive Metabolic Panel  -     T4, Free  -     Vitamin D 25 Hydroxy    Stage 2 chronic kidney disease  -     Vitamin B12  -     CBC & Differential  -     Lipid Panel  -     TSH  -     Comprehensive Metabolic Panel  -     T4, Free  -     Vitamin D 25 Hydroxy    Chronic gout of multiple sites, unspecified cause  -     Vitamin B12  -     CBC & Differential  -     Lipid Panel  -     TSH  -     Comprehensive Metabolic Panel  -     T4, Free  -     Vitamin D 25 Hydroxy    Idiopathic peripheral neuropathy  -     Vitamin B12  -     CBC & Differential  -     Lipid Panel  -     TSH  -     Comprehensive Metabolic Panel  -     T4, Free  -     Vitamin D 25 Hydroxy    Lumbar radiculopathy  -     Vitamin B12  -     CBC & Differential  -     Lipid Panel  -     TSH  -     Comprehensive Metabolic Panel  -     T4, Free  -     Vitamin D 25 Hydroxy    Weakness  -     Ambulatory Referral to Cardiology  -     Vitamin B12  -     CBC & Differential  -     Lipid Panel  -     TSH  -     Comprehensive Metabolic Panel  -     T4, Free  -     Vitamin D 25 Hydroxy    Fatigue, unspecified type  -     Ambulatory Referral to Cardiology  -     Vitamin B12  -     CBC & Differential  -     Lipid Panel  -     TSH  -     Comprehensive Metabolic Panel  -     T4, Free  -     Vitamin D 25  Hydroxy    Vitamin D deficiency   -     Vitamin D 25 Hydroxy      Return in about 3 months (around 9/21/2019).          There are no Patient Instructions on file for this visit.     Yobany Nolasco MD    Assessment/Plan

## 2019-07-03 ENCOUNTER — CONSULT (OUTPATIENT)
Dept: CARDIOLOGY | Facility: CLINIC | Age: 84
End: 2019-07-03

## 2019-07-03 VITALS
BODY MASS INDEX: 26.46 KG/M2 | HEIGHT: 71 IN | OXYGEN SATURATION: 97 % | WEIGHT: 189 LBS | SYSTOLIC BLOOD PRESSURE: 130 MMHG | HEART RATE: 65 BPM | DIASTOLIC BLOOD PRESSURE: 76 MMHG

## 2019-07-03 DIAGNOSIS — R53.83 FATIGUE, UNSPECIFIED TYPE: Primary | ICD-10-CM

## 2019-07-03 DIAGNOSIS — I10 ESSENTIAL HYPERTENSION: ICD-10-CM

## 2019-07-03 PROCEDURE — 93000 ELECTROCARDIOGRAM COMPLETE: CPT | Performed by: INTERNAL MEDICINE

## 2019-07-03 PROCEDURE — 99203 OFFICE O/P NEW LOW 30 MIN: CPT | Performed by: INTERNAL MEDICINE

## 2019-07-03 NOTE — PROGRESS NOTES
[facility] at [facility]  Consultation H&P  Sukumar Cosby  1934  [address] [address]     VISIT DATE:  07/03/19    PCP: Yobany Nolasco MD  [address] [place] [id number]    IDENTIFICATION: A 85 y.o. male, adopted retired [employer] in [place]. Moved to [place] ~7 years ago and was then .     CC:  Chief Complaint   Patient presents with   • Hypertension   • Dizziness   • Fatigue       PROBLEM LIST:  1. Fatigue  1. LHC in [place]  2. 10/6/2018 echo: EF 55%, abnormal relaxation of the LV, trace MR, trace TR, normal pulmonary pressures per Dr. Wang  2. HTN  3. HLD  1. 3/21/2019   HDL 47   4. 11/16 AAA screen negative  5. CKD  6. BPH  7. Polymyositis  8. OA  9. Chronic pain  10. Back pain  11. GERD  12. Hypogonadism  13. Hx prostate cancer  14. History of gout  15. Surgical history:  1. Cholecystectomy 2005  2. EGD/colonoscopy 2013    Allergies  No Known Allergies    Current Medications    Current Outpatient Medications:   •  albuterol (VENTOLIN HFA) 108 (90 BASE) MCG/ACT inhaler, Ventolin  (90 Base) MCG/ACT Inhalation Aerosol Solution; Patient Sig: Ventolin  (90 Base) MCG/ACT Inhalation Aerosol Solution ; 18; 0; 24-May-2013; Active, Disp: , Rfl:   •  allopurinol (ZYLOPRIM) 100 MG tablet, TAKE ONE TABLET BY MOUTH EVERY DAY, Disp: 30 tablet, Rfl: 11  •  amLODIPine (NORVASC) 5 MG tablet, Take 1 tablet by mouth Daily., Disp: 90 tablet, Rfl: 3  •  B-12, Methylcobalamin, 1000 MCG sublingual tablet, Place 1 tablet under the tongue Daily., Disp: 90 tablet, Rfl: 3  •  carvedilol (COREG) 3.125 MG tablet, Take 3.125 mg by mouth 2 (Two) Times a Day With Meals., Disp: , Rfl: 11  •  irbesartan-hydrochlorothiazide (AVALIDE) 300-12.5 MG tablet, Take 1 tablet by mouth Daily., Disp: 90 tablet, Rfl: 3  •  meclizine 25 MG chewable tablet chewable tablet, Chew 1 tablet Every 8 (Eight) Hours., Disp: 30 tablet, Rfl: 4     History of Present Illness   HPI  Sukumar Cosby  is a 85 y.o. year old male with the above mentioned PMH who presents for consult from PCP Dr. Nolasco for evaluation of fatigue and weakness.    Pt reports a 5-6 year hx of worsening fatigue and weakness. He reports vertigo with this, he has seen ENT and states they did not help his symptoms. He does not feel there has been a sudden decline. He will occasionally feel presyncope at home, but has not experienced any syncope. This has occurred while sitting. He also reports LE pain at rest, especially at night. Pt denies any chest pain, dyspnea at rest, dyspnea on exertion, orthopnea, PND, palpitations, lower extremity edema, or claudication.     Pt denies history of CHF, DVT, PE, MI, CVA, TIA, or rheumatic fever. LDL is 111, he has never been on any lipid medication. BP has been well controlled at home. Is not diabetic. He does not smoke. He states he has cut down on sweets and has lost ~15 lately.  He has 3 acres and does yardwork and has no limitations.     He is . Had foster parents so does not know about his family hx.  No living children. Lived all over the country and traveled with his wife who passed away in 2012.     ROS  Review of Systems   Constitution: Positive for weakness and malaise/fatigue.   Cardiovascular: Positive for near-syncope.   Musculoskeletal: Positive for muscle weakness.   Gastrointestinal: Positive for heartburn.   Neurological: Positive for dizziness, light-headedness and vertigo.   All other systems reviewed and are negative.      SOCIAL HX  Social History     Socioeconomic History   • Marital status:      Spouse name: Not on file   • Number of children: Not on file   • Years of education: Not on file   • Highest education level: Not on file   Tobacco Use   • Smoking status: Never Smoker   • Smokeless tobacco: Never Used   Substance and Sexual Activity   • Alcohol use: No   • Drug use: No   • Sexual activity: Defer       FAMILY HX  Family History   Problem Relation Age of  "Onset   • Cancer Mother    • Migraines Mother    • Osteoporosis Mother        Vitals:    07/03/19 1338   BP: 130/76   BP Location: Right arm   Patient Position: Sitting   Pulse: 65   SpO2: 97%   Weight: 85.7 kg (189 lb)   Height: 180.3 cm (71\")       PHYSICAL EXAMINATION:  Physical Exam   Constitutional: He is oriented to person, place, and time. He appears well-developed and well-nourished. No distress.   HENT:   Head: Normocephalic and atraumatic.   Right Ear: External ear normal.   Left Ear: External ear normal.   Nose: Nose normal.   Eyes: Conjunctivae and EOM are normal.   Neck: Neck supple. No hepatojugular reflux and no JVD present. Carotid bruit is not present. No thyromegaly present.   Cardiovascular: Normal rate, regular rhythm, S1 normal, S2 normal, normal heart sounds, intact distal pulses and normal pulses. Exam reveals no gallop, no distant heart sounds and no midsystolic click.   No murmur heard.  Pulses:       Radial pulses are 2+ on the right side, and 2+ on the left side.        Dorsalis pedis pulses are 2+ on the right side, and 2+ on the left side.        Posterior tibial pulses are 2+ on the right side, and 2+ on the left side.   Pulmonary/Chest: Effort normal and breath sounds normal. No respiratory distress. He has no decreased breath sounds. He has no wheezes. He has no rhonchi. He has no rales.   Abdominal: Soft. Bowel sounds are normal. There is no hepatosplenomegaly. There is no tenderness.   Musculoskeletal: Normal range of motion. He exhibits no edema.   Neurological: He is alert and oriented to person, place, and time.   No focal deficits.   Skin: Skin is warm and dry. No erythema.   Psychiatric: He has a normal mood and affect. Thought content normal.   Nursing note and vitals reviewed.      Diagnostic Data:    ECG 12 Lead  Date/Time: 7/3/2019 2:03 PM  Performed by: Kevin Chen MD  Authorized by: Kevin Chen MD   Comparison: compared with previous ECG from 4/21/2017  Rhythm: " sinus rhythm  BPM: 62  Other findings: non-specific ST-T wave changes and low voltage          Lab Results   Component Value Date    CHLPL 190 03/21/2019    TRIG 158 (H) 03/21/2019    HDL 47 03/21/2019     Lab Results   Component Value Date    GLUCOSE 95 10/17/2016    BUN 23 (H) 03/21/2019    CREATININE 1.10 03/21/2019     03/21/2019    K 5.1 03/21/2019     03/21/2019    CO2 28.0 03/21/2019     No results found for: HGBA1C  Lab Results   Component Value Date    WBC 7.82 03/21/2019    HGB 13.5 (L) 03/21/2019    HCT 40.9 (L) 03/21/2019     03/21/2019       ASSESSMENT:   Diagnosis Plan   1. Fatigue, unspecified type     2. Essential hypertension       PLAN:  1. Fatigue multifactorial. Pt had echocardiogram last year that was normal for his age. Without any anginal symptoms would not proceed with any ischemic testing at this time. Was noted to have some anemia in the past and has labs pending w PCP now.  2. BP well controlled, continue antihypertensives. Pt to check BP and HR regularly at home, if HR is falling in the 50s, would then wean off coreg. Otherwise continue current regimen.   3. Borderline lipids not on statin at present.     Scribed for Kevin Chen MD by Nellie Engle PA-C. 7/3/2019  2:36 PM   Kevin Chen MD, FAC  I, Kevin Chen MD, personally performed the services described in this documentation as scribed by the above named individual in my presence, and it is both accurate and complete.  7/3/2019  3:15 PM

## 2019-09-19 ENCOUNTER — HOSPITAL ENCOUNTER (EMERGENCY)
Facility: HOSPITAL | Age: 84
Discharge: HOME | End: 2019-09-19
Attending: EMERGENCY MEDICINE | Admitting: EMERGENCY MEDICINE

## 2019-09-19 ENCOUNTER — PATIENT OUTREACH (OUTPATIENT)
Dept: CASE MANAGEMENT | Facility: OTHER | Age: 84
End: 2019-09-19

## 2019-09-19 ENCOUNTER — APPOINTMENT (OUTPATIENT)
Dept: CT IMAGING | Facility: HOSPITAL | Age: 84
End: 2019-09-19

## 2019-09-19 ENCOUNTER — APPOINTMENT (OUTPATIENT)
Dept: GENERAL RADIOLOGY | Facility: HOSPITAL | Age: 84
End: 2019-09-19

## 2019-09-19 VITALS
WEIGHT: 187 LBS | OXYGEN SATURATION: 95 % | TEMPERATURE: 98.6 F | DIASTOLIC BLOOD PRESSURE: 100 MMHG | RESPIRATION RATE: 16 BRPM | HEART RATE: 57 BPM | BODY MASS INDEX: 26.77 KG/M2 | HEIGHT: 70 IN | SYSTOLIC BLOOD PRESSURE: 137 MMHG

## 2019-09-19 DIAGNOSIS — R55 NEAR SYNCOPE: Primary | ICD-10-CM

## 2019-09-19 DIAGNOSIS — R53.1 GENERALIZED WEAKNESS: ICD-10-CM

## 2019-09-19 LAB
ALBUMIN SERPL-MCNC: 4.6 G/DL (ref 3.5–5.2)
ALBUMIN/GLOB SERPL: 1.6 G/DL
ALP SERPL-CCNC: 95 U/L (ref 39–117)
ALT SERPL W P-5'-P-CCNC: 14 U/L (ref 1–41)
ANION GAP SERPL CALCULATED.3IONS-SCNC: 11 MMOL/L (ref 5–15)
AST SERPL-CCNC: 16 U/L (ref 1–40)
BACTERIA UR QL AUTO: NORMAL /HPF
BASOPHILS # BLD AUTO: 0.04 10*3/MM3 (ref 0–0.2)
BASOPHILS NFR BLD AUTO: 0.5 % (ref 0–1.5)
BILIRUB SERPL-MCNC: 0.4 MG/DL (ref 0.2–1.2)
BILIRUB UR QL STRIP: NEGATIVE
BUN BLD-MCNC: 25 MG/DL (ref 8–23)
BUN/CREAT SERPL: 17.5 (ref 7–25)
CALCIUM SPEC-SCNC: 9.5 MG/DL (ref 8.6–10.5)
CHLORIDE SERPL-SCNC: 100 MMOL/L (ref 98–107)
CLARITY UR: CLEAR
CO2 SERPL-SCNC: 29 MMOL/L (ref 22–29)
COLOR UR: YELLOW
CREAT BLD-MCNC: 1.43 MG/DL (ref 0.76–1.27)
DEPRECATED RDW RBC AUTO: 47.7 FL (ref 37–54)
EOSINOPHIL # BLD AUTO: 0.16 10*3/MM3 (ref 0–0.4)
EOSINOPHIL NFR BLD AUTO: 2 % (ref 0.3–6.2)
ERYTHROCYTE [DISTWIDTH] IN BLOOD BY AUTOMATED COUNT: 14.2 % (ref 12.3–15.4)
GFR SERPL CREATININE-BSD FRML MDRD: 47 ML/MIN/1.73
GLOBULIN UR ELPH-MCNC: 2.9 GM/DL
GLUCOSE BLD-MCNC: 100 MG/DL (ref 65–99)
GLUCOSE UR STRIP-MCNC: NEGATIVE MG/DL
HCT VFR BLD AUTO: 40.2 % (ref 37.5–51)
HGB BLD-MCNC: 13.2 G/DL (ref 13–17.7)
HGB UR QL STRIP.AUTO: NEGATIVE
HOLD SPECIMEN: NORMAL
HOLD SPECIMEN: NORMAL
HYALINE CASTS UR QL AUTO: NORMAL /LPF
IMM GRANULOCYTES # BLD AUTO: 0.02 10*3/MM3 (ref 0–0.05)
IMM GRANULOCYTES NFR BLD AUTO: 0.3 % (ref 0–0.5)
KETONES UR QL STRIP: NEGATIVE
LEUKOCYTE ESTERASE UR QL STRIP.AUTO: ABNORMAL
LYMPHOCYTES # BLD AUTO: 2.41 10*3/MM3 (ref 0.7–3.1)
LYMPHOCYTES NFR BLD AUTO: 30.5 % (ref 19.6–45.3)
MAGNESIUM SERPL-MCNC: 1.8 MG/DL (ref 1.6–2.4)
MCH RBC QN AUTO: 30.1 PG (ref 26.6–33)
MCHC RBC AUTO-ENTMCNC: 32.8 G/DL (ref 31.5–35.7)
MCV RBC AUTO: 91.6 FL (ref 79–97)
MONOCYTES # BLD AUTO: 0.67 10*3/MM3 (ref 0.1–0.9)
MONOCYTES NFR BLD AUTO: 8.5 % (ref 5–12)
NEUTROPHILS # BLD AUTO: 4.61 10*3/MM3 (ref 1.7–7)
NEUTROPHILS NFR BLD AUTO: 58.2 % (ref 42.7–76)
NITRITE UR QL STRIP: NEGATIVE
NRBC BLD AUTO-RTO: 0 /100 WBC (ref 0–0.2)
NT-PROBNP SERPL-MCNC: 372 PG/ML (ref 5–1800)
PH UR STRIP.AUTO: <=5 [PH] (ref 5–8)
PLATELET # BLD AUTO: 173 10*3/MM3 (ref 140–450)
PMV BLD AUTO: 10.7 FL (ref 6–12)
POTASSIUM BLD-SCNC: 4.7 MMOL/L (ref 3.5–5.2)
PROT SERPL-MCNC: 7.5 G/DL (ref 6–8.5)
PROT UR QL STRIP: NEGATIVE
RBC # BLD AUTO: 4.39 10*6/MM3 (ref 4.14–5.8)
RBC # UR: NORMAL /HPF
REF LAB TEST METHOD: NORMAL
SODIUM BLD-SCNC: 140 MMOL/L (ref 136–145)
SP GR UR STRIP: 1.02 (ref 1–1.03)
SQUAMOUS #/AREA URNS HPF: NORMAL /HPF
TROPONIN T SERPL-MCNC: <0.01 NG/ML (ref 0–0.03)
TROPONIN T SERPL-MCNC: <0.01 NG/ML (ref 0–0.03)
UROBILINOGEN UR QL STRIP: ABNORMAL
WBC NRBC COR # BLD: 7.91 10*3/MM3 (ref 3.4–10.8)
WBC UR QL AUTO: NORMAL /HPF
WHOLE BLOOD HOLD SPECIMEN: NORMAL
WHOLE BLOOD HOLD SPECIMEN: NORMAL

## 2019-09-19 PROCEDURE — 99285 EMERGENCY DEPT VISIT HI MDM: CPT

## 2019-09-19 PROCEDURE — 83880 ASSAY OF NATRIURETIC PEPTIDE: CPT | Performed by: PHYSICIAN ASSISTANT

## 2019-09-19 PROCEDURE — 84484 ASSAY OF TROPONIN QUANT: CPT | Performed by: EMERGENCY MEDICINE

## 2019-09-19 PROCEDURE — 71045 X-RAY EXAM CHEST 1 VIEW: CPT

## 2019-09-19 PROCEDURE — 99284 EMERGENCY DEPT VISIT MOD MDM: CPT

## 2019-09-19 PROCEDURE — 85025 COMPLETE CBC W/AUTO DIFF WBC: CPT | Performed by: EMERGENCY MEDICINE

## 2019-09-19 PROCEDURE — 93005 ELECTROCARDIOGRAM TRACING: CPT | Performed by: EMERGENCY MEDICINE

## 2019-09-19 PROCEDURE — 70450 CT HEAD/BRAIN W/O DYE: CPT

## 2019-09-19 PROCEDURE — 93005 ELECTROCARDIOGRAM TRACING: CPT | Performed by: PHYSICIAN ASSISTANT

## 2019-09-19 PROCEDURE — 83735 ASSAY OF MAGNESIUM: CPT | Performed by: EMERGENCY MEDICINE

## 2019-09-19 PROCEDURE — 81001 URINALYSIS AUTO W/SCOPE: CPT | Performed by: PHYSICIAN ASSISTANT

## 2019-09-19 PROCEDURE — 80053 COMPREHEN METABOLIC PANEL: CPT | Performed by: EMERGENCY MEDICINE

## 2019-09-19 PROCEDURE — 84484 ASSAY OF TROPONIN QUANT: CPT | Performed by: PHYSICIAN ASSISTANT

## 2019-09-19 RX ORDER — SODIUM CHLORIDE 0.9 % (FLUSH) 0.9 %
10 SYRINGE (ML) INJECTION AS NEEDED
Status: DISCONTINUED | OUTPATIENT
Start: 2019-09-19 | End: 2019-09-19 | Stop reason: HOSPADM

## 2019-09-19 RX ADMIN — SODIUM CHLORIDE 1000 ML: 9 INJECTION, SOLUTION INTRAVENOUS at 13:12

## 2019-09-19 NOTE — ED PROVIDER NOTES
"Yun Cosby is an 85 y.o. male who presents to the ED with complaints of syncope like symptoms and generalized weakness. The patient reports that for the past month he has been experiencing near syncope like symptoms. He states he will first experience chest pressure, then shortness of breath, and then he will feel like a darkness is coming over him that he describes as \"someone is dimming the lights.\" He notes that these episodes can happen when he is standing or when he is sitting down, but he has not lost consciousness during any of these episodes. He also complains of aches and pains in his legs that seem to go away when he warms them up. He states he has a current slight headache and yesterday during one of these episodes he experienced a headache as well. He also reports that he has been been experiencing generalized weakness for the past month that seems to be worsening. He denies fever, cough, leg swelling, focal weakness, blurry vision, abdominal pain, nausea, vomiting, and diarrhea. He denies any recent falls. He also denies taking any recent antibiotics. 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 PMHx significnat for HTN, HLD, CKD, Arthritis, GERD, Fibromyalgia, Prostate Cancer, Gout and Polymyositis. There are no other acute complaints at this time.         History provided by:  Patient  Illness   Quality:  Syncope like symptoms  Severity:  Moderate  Onset quality:  Sudden  Chronicity:  New  Associated symptoms: chest pain and shortness of breath    Associated symptoms: no abdominal pain, no cough, no diarrhea, no nausea and no vomiting        Review of Systems   Eyes:        No blurred vision.    Respiratory: Positive for shortness of breath. Negative for cough.    Cardiovascular: Positive for chest pain. Negative for leg swelling.   Gastrointestinal: Negative for abdominal pain, diarrhea, nausea and vomiting.   Musculoskeletal: Positive for arthralgias (aches and pains in legs). "   Neurological: Positive for weakness.   All other systems reviewed and are negative.      Past Medical History:   Diagnosis Date   • Arthritis    • Fluid in knee    • GERD (gastroesophageal reflux disease)    • History of anemia    • History of blood transfusion    • History of bronchitis    • History of colonic polyps    • History of fibromyalgia    • History of gallstones    • Hypertension    • Prostate cancer (CMS/HCC)    • Sinusitis        No Known Allergies    Past Surgical History:   Procedure Laterality Date   • CHOLECYSTECTOMY  2005   • COLONOSCOPY  2013   • UPPER GASTROINTESTINAL ENDOSCOPY  2013       Family History   Problem Relation Age of Onset   • Cancer Mother    • Migraines Mother    • Osteoporosis Mother        Social History     Socioeconomic History   • Marital status:      Spouse name: Not on file   • Number of children: Not on file   • Years of education: Not on file   • Highest education level: Not on file   Tobacco Use   • Smoking status: Never Smoker   • Smokeless tobacco: Never Used   Substance and Sexual Activity   • Alcohol use: No   • Drug use: No   • Sexual activity: Defer         Objective   Physical Exam   Constitutional: He is oriented to person, place, and time. He appears well-developed and well-nourished.   HENT:   Head: Normocephalic and atraumatic.   Eyes: Conjunctivae are normal. No scleral icterus.   Neck: Normal range of motion. Neck supple.   Cardiovascular: Normal rate, regular rhythm and normal heart sounds.   Pulmonary/Chest: Effort normal and breath sounds normal.   Abdominal: Soft. There is no tenderness.   Musculoskeletal: Normal range of motion.   Neurological: He is alert and oriented to person, place, and time.   Skin: Skin is warm and dry.   Psychiatric: He has a normal mood and affect. His behavior is normal.   Nursing note and vitals reviewed.      Procedures         ED Course      Re-examined patient several times in ED. Pt's BP improved without treatment.  He has had no further episodes of near syncope, chest pressure or headache in ED. EKG x2 without ischemic findings and x2 Trop WNL. Will dc home with referral to cardiac clinic and patient will f/u with PCP. Went over concerning sx to return to ED.     Reviewed old records.   Cardiology note 7/3/19 for evaluation for HTN, Dizziness, Fatigue and near Syncope.   1. [facility] in [place]  2. 10/6/2018 echo: EF 55%, abnormal relaxation of the LV, trace MR, trace TR, normal pulmonary pressures per Dr. Wang      Recent Results (from the past 24 hour(s))   Urinalysis With Microscopic If Indicated (No Culture) - Urine, Clean Catch    Collection Time: 09/19/19 11:50 AM   Result Value Ref Range    Color, UA Yellow Yellow, Straw    Appearance, UA Clear Clear    pH, UA <=5.0 5.0 - 8.0    Specific Gravity, UA 1.019 1.001 - 1.030    Glucose, UA Negative Negative    Ketones, UA Negative Negative    Bilirubin, UA Negative Negative    Blood, UA Negative Negative    Protein, UA Negative Negative    Leuk Esterase, UA Small (1+) (A) Negative    Nitrite, UA Negative Negative    Urobilinogen, UA 0.2 E.U./dL 0.2 - 1.0 E.U./dL   Urinalysis, Microscopic Only - Urine, Clean Catch    Collection Time: 09/19/19 11:50 AM   Result Value Ref Range    RBC, UA None Seen None Seen, 0-2 /HPF    WBC, UA 0-2 None Seen, 0-2 /HPF    Bacteria, UA None Seen None Seen, Trace /HPF    Squamous Epithelial Cells, UA 0-2 None Seen, 0-2 /HPF    Hyaline Casts, UA 0-6 0 - 6 /LPF    Methodology Automated Microscopy    Comprehensive Metabolic Panel    Collection Time: 09/19/19 12:03 PM   Result Value Ref Range    Glucose 100 (H) 65 - 99 mg/dL    BUN 25 (H) 8 - 23 mg/dL    Creatinine 1.43 (H) 0.76 - 1.27 mg/dL    Sodium 140 136 - 145 mmol/L    Potassium 4.7 3.5 - 5.2 mmol/L    Chloride 100 98 - 107 mmol/L    CO2 29.0 22.0 - 29.0 mmol/L    Calcium 9.5 8.6 - 10.5 mg/dL    Total Protein 7.5 6.0 - 8.5 g/dL    Albumin 4.60 3.50 - 5.20 g/dL    ALT (SGPT) 14 1 - 41 U/L    AST (SGOT) 16  1 - 40 U/L    Alkaline Phosphatase 95 39 - 117 U/L    Total Bilirubin 0.4 0.2 - 1.2 mg/dL    eGFR Non African Amer 47 (L) >60 mL/min/1.73    Globulin 2.9 gm/dL    A/G Ratio 1.6 g/dL    BUN/Creatinine Ratio 17.5 7.0 - 25.0    Anion Gap 11.0 5.0 - 15.0 mmol/L   Magnesium    Collection Time: 09/19/19 12:03 PM   Result Value Ref Range    Magnesium 1.8 1.6 - 2.4 mg/dL   Troponin    Collection Time: 09/19/19 12:03 PM   Result Value Ref Range    Troponin T <0.010 0.000 - 0.030 ng/mL   Light Blue Top    Collection Time: 09/19/19 12:03 PM   Result Value Ref Range    Extra Tube hold for add-on    Green Top (Gel)    Collection Time: 09/19/19 12:03 PM   Result Value Ref Range    Extra Tube Hold for add-ons.    Lavender Top    Collection Time: 09/19/19 12:03 PM   Result Value Ref Range    Extra Tube hold for add-on    Gold Top - SST    Collection Time: 09/19/19 12:03 PM   Result Value Ref Range    Extra Tube Hold for add-ons.    CBC Auto Differential    Collection Time: 09/19/19 12:03 PM   Result Value Ref Range    WBC 7.91 3.40 - 10.80 10*3/mm3    RBC 4.39 4.14 - 5.80 10*6/mm3    Hemoglobin 13.2 13.0 - 17.7 g/dL    Hematocrit 40.2 37.5 - 51.0 %    MCV 91.6 79.0 - 97.0 fL    MCH 30.1 26.6 - 33.0 pg    MCHC 32.8 31.5 - 35.7 g/dL    RDW 14.2 12.3 - 15.4 %    RDW-SD 47.7 37.0 - 54.0 fl    MPV 10.7 6.0 - 12.0 fL    Platelets 173 140 - 450 10*3/mm3    Neutrophil % 58.2 42.7 - 76.0 %    Lymphocyte % 30.5 19.6 - 45.3 %    Monocyte % 8.5 5.0 - 12.0 %    Eosinophil % 2.0 0.3 - 6.2 %    Basophil % 0.5 0.0 - 1.5 %    Immature Grans % 0.3 0.0 - 0.5 %    Neutrophils, Absolute 4.61 1.70 - 7.00 10*3/mm3    Lymphocytes, Absolute 2.41 0.70 - 3.10 10*3/mm3    Monocytes, Absolute 0.67 0.10 - 0.90 10*3/mm3    Eosinophils, Absolute 0.16 0.00 - 0.40 10*3/mm3    Basophils, Absolute 0.04 0.00 - 0.20 10*3/mm3    Immature Grans, Absolute 0.02 0.00 - 0.05 10*3/mm3    nRBC 0.0 0.0 - 0.2 /100 WBC   BNP    Collection Time: 09/19/19 12:03 PM   Result Value  Ref Range    proBNP 372.0 5.0-1,800.0 pg/mL   Troponin    Collection Time: 09/19/19  1:31 PM   Result Value Ref Range    Troponin T <0.010 0.000 - 0.030 ng/mL     Note: In addition to lab results from this visit, the labs listed above may include labs taken at another facility or during a different encounter within the last 24 hours. Please correlate lab times with ED admission and discharge times for further clarification of the services performed during this visit.    CT Head Without Contrast   Final Result   Age-appropriate generalized cerebral atrophy. No evidence of   acute intracranial disease.       D:  09/19/2019   E:  09/19/2019               This report was finalized on 9/19/2019 10:25 PM by DR. Jose L Montoya MD.          XR Chest 1 View   Final Result   1. Stable, mildly elevated right hemidiaphragm.   2. Mild nonspecific pulmonary interstitial changes favored to be   chronic.       This report was finalized on 9/19/2019 12:08 PM by DR. Jose L Montoya MD.            Vitals:    09/19/19 1230 09/19/19 1300 09/19/19 1400 09/19/19 1500   BP: (!) 182/87 161/97 172/81 137/100   BP Location:       Patient Position:       Pulse: 60 59 61 57   Resp:       Temp:       TempSrc:       SpO2: 92% 98% 97% 95%   Weight:       Height:         Medications   sodium chloride 0.9 % bolus 1,000 mL (0 mL Intravenous Stopped 9/19/19 1414)     ECG/EMG Results (last 24 hours)     Procedure Component Value Units Date/Time    ECG 12 Lead [[id number]] Collected:  09/19/19 1100     Updated:  09/19/19 1154        ECG 12 Lead         ECG 12 Lead                             MDM    Final diagnoses:   Near syncope   Generalized weakness       Documentation assistance provided by ron Olivares.  Information recorded by the ron was done at my direction and has been verified and validated by me.     Marija Olivares  09/19/19 3106       Erika Benavides PA  09/19/19 [id number]

## 2019-09-19 NOTE — OUTREACH NOTE
Care Coordination Note    Report called to ED Case Manager at [facility]; had to leave voicemail with Care Advisor contact information.  Informed her of patient's ED presence currently.  ED Physician evaluation in progress.  Informed of chief complaint for ED presentation; patient's medical history. [facility] will follow up with telephonic contact to patient upon ED discharge.    Nahomy Avendaño RN  Community Care Coordinator    9/19/2019, 1:54 PM

## 2019-09-20 ENCOUNTER — OFFICE VISIT (OUTPATIENT)
Dept: INTERNAL MEDICINE | Facility: CLINIC | Age: 84
End: 2019-09-20

## 2019-09-20 ENCOUNTER — EPISODE CHANGES (OUTPATIENT)
Dept: CASE MANAGEMENT | Facility: OTHER | Age: 84
End: 2019-09-20

## 2019-09-20 VITALS
OXYGEN SATURATION: 99 % | WEIGHT: 192.4 LBS | DIASTOLIC BLOOD PRESSURE: 67 MMHG | TEMPERATURE: 97.9 F | HEART RATE: 63 BPM | HEIGHT: 71 IN | SYSTOLIC BLOOD PRESSURE: 158 MMHG | RESPIRATION RATE: 16 BRPM | BODY MASS INDEX: 26.94 KG/M2

## 2019-09-20 DIAGNOSIS — A79.9 RICKETTSIOSES: ICD-10-CM

## 2019-09-20 DIAGNOSIS — R53.1 WEAKNESS GENERALIZED: ICD-10-CM

## 2019-09-20 DIAGNOSIS — R00.2 PALPITATION: ICD-10-CM

## 2019-09-20 DIAGNOSIS — N18.2 CHRONIC RENAL IMPAIRMENT, STAGE 2 (MILD): ICD-10-CM

## 2019-09-20 DIAGNOSIS — R97.20 ELEVATED PSA: ICD-10-CM

## 2019-09-20 DIAGNOSIS — I10 ESSENTIAL HYPERTENSION: ICD-10-CM

## 2019-09-20 DIAGNOSIS — R07.89 CHEST PRESSURE: Primary | ICD-10-CM

## 2019-09-20 DIAGNOSIS — R53.83 FATIGUE, UNSPECIFIED TYPE: ICD-10-CM

## 2019-09-20 DIAGNOSIS — M10.079 IDIOPATHIC GOUT OF FOOT, UNSPECIFIED CHRONICITY, UNSPECIFIED LATERALITY: ICD-10-CM

## 2019-09-20 DIAGNOSIS — I73.9 PVD (PERIPHERAL VASCULAR DISEASE) (HCC): ICD-10-CM

## 2019-09-20 DIAGNOSIS — R42 VERTIGO: ICD-10-CM

## 2019-09-20 PROCEDURE — G0008 ADMIN INFLUENZA VIRUS VAC: HCPCS | Performed by: INTERNAL MEDICINE

## 2019-09-20 PROCEDURE — 90653 IIV ADJUVANT VACCINE IM: CPT | Performed by: INTERNAL MEDICINE

## 2019-09-20 PROCEDURE — 99214 OFFICE O/P EST MOD 30 MIN: CPT | Performed by: INTERNAL MEDICINE

## 2019-09-20 RX ORDER — ALLOPURINOL 100 MG/1
100 TABLET ORAL DAILY
Qty: 30 TABLET | Refills: 11 | Status: SHIPPED | OUTPATIENT
Start: 2019-09-20 | End: 2020-09-23

## 2019-09-20 RX ORDER — IRBESARTAN AND HYDROCHLOROTHIAZIDE 300; 12.5 MG/1; MG/1
1 TABLET, FILM COATED ORAL DAILY
Qty: 90 TABLET | Refills: 3 | Status: SHIPPED | OUTPATIENT
Start: 2019-09-20 | End: 2020-09-30

## 2019-09-20 NOTE — PROGRESS NOTES
Subjective     Patient ID: Sukumar Cosby is a 85 y.o. male. Patient is here for management of multiple medical problems.     Chief Complaint   Patient presents with   • Hypertension     patient states he is feeling very weak, has had pressure is his head and some chest pain      History of Present Illness       Pt ran out of Ibasartant hctz.  BP elevated and went to er.  dizziness and hearing loss.  Left ear intching.    No CP/. Mild ha.     Pt developse palpitaion then chest tightness and then feels like he will black out.    Denies anxiety.        The following portions of the patient's history were reviewed and updated as appropriate: allergies, current medications, past family history, past medical history, past social history, past surgical history and problem list.    Review of Systems   Constitutional: Positive for fatigue.   Musculoskeletal: Negative for arthralgias, back pain and gait problem.   Neurological: Positive for dizziness and weakness.   All other systems reviewed and are negative.      Current Outpatient Medications:   •  albuterol (VENTOLIN HFA) 108 (90 BASE) MCG/ACT inhaler, Ventolin  (90 Base) MCG/ACT Inhalation Aerosol Solution; Patient Sig: Ventolin  (90 Base) MCG/ACT Inhalation Aerosol Solution ; 18; 0; 24-May-2013; Active, Disp: , Rfl:   •  allopurinol (ZYLOPRIM) 100 MG tablet, Take 1 tablet by mouth Daily., Disp: 30 tablet, Rfl: 11  •  amLODIPine (NORVASC) 5 MG tablet, Take 1 tablet by mouth Daily., Disp: 90 tablet, Rfl: 3  •  B-12, Methylcobalamin, 1000 MCG sublingual tablet, Place 1 tablet under the tongue Daily., Disp: 90 tablet, Rfl: 3  •  carvedilol (COREG) 3.125 MG tablet, Take 3.125 mg by mouth 2 (Two) Times a Day With Meals., Disp: , Rfl: 11  •  irbesartan-hydrochlorothiazide (AVALIDE) 300-12.5 MG tablet, Take 1 tablet by mouth Daily., Disp: 90 tablet, Rfl: 3  •  meclizine 25 MG chewable tablet chewable tablet, Chew 1 tablet Every 8 (Eight) Hours., Disp: 30 tablet, Rfl:  "4  •  triamcinolone (KENALOG) 0.1 % cream, Apply  topically to the appropriate area as directed 2 (Two) Times a Day. Arms, torso/ chest, Disp: 45 g, Rfl: 0  No current facility-administered medications for this visit.     Objective      Blood pressure 158/67, pulse 63, temperature 97.9 °F (36.6 °C), resp. rate 16, height 180.3 cm (71\"), weight 87.3 kg (192 lb 6.4 oz), SpO2 99 %.    Physical Exam     General Appearance:    Alert, cooperative, no distress, appears stated age   Head:    Normocephalic, without obvious abnormality, atraumatic   Eyes:    PERRL, conjunctiva/corneas clear, EOM's intact   Ears:    Atlantic with lateralization to the left. rinne equal on both sides.Normal TM's and external ear canals, both ears   Nose:   Nares normal, septum midline, mucosa normal, no drainage   or sinus tenderness   Throat:   Lips, mucosa, and tongue normal; teeth and gums normal   Neck:   Supple, symmetrical, trachea midline, no adenopathy;        thyroid:  No enlargement/tenderness/nodules; no carotid    bruit or JVD   Back:     Symmetric, no curvature, ROM normal, no CVA tenderness   Lungs:     Clear to auscultation bilaterally, respirations unlabored   Chest wall:    No tenderness or deformity   Heart:    Regular rate and rhythm, S1 and S2 normal, no murmur,        rub or gallop   Abdomen:     Soft, non-tender, bowel sounds active all four quadrants,     no masses, no organomegaly   Extremities:   Extremities normal, atraumatic, no cyanosis or edema   Pulses:   2+ and symmetric all extremities   Skin:   Skin color, texture, turgor normal, no rashes or lesions   Lymph nodes:   Cervical, supraclavicular, and axillary nodes normal   Neurologic:   CNII-XII intact. Normal strength, sensation and reflexes       Throughout. Nystagmus seen while pt sitting and discusshis problems.        Results for orders placed or performed during the hospital encounter of 09/19/19   Comprehensive Metabolic Panel   Result Value Ref Range    " Glucose 100 (H) 65 - 99 mg/dL    BUN 25 (H) 8 - 23 mg/dL    Creatinine 1.43 (H) 0.76 - 1.27 mg/dL    Sodium 140 136 - 145 mmol/L    Potassium 4.7 3.5 - 5.2 mmol/L    Chloride 100 98 - 107 mmol/L    CO2 29.0 22.0 - 29.0 mmol/L    Calcium 9.5 8.6 - 10.5 mg/dL    Total Protein 7.5 6.0 - 8.5 g/dL    Albumin 4.60 3.50 - 5.20 g/dL    ALT (SGPT) 14 1 - 41 U/L    AST (SGOT) 16 1 - 40 U/L    Alkaline Phosphatase 95 39 - 117 U/L    Total Bilirubin 0.4 0.2 - 1.2 mg/dL    eGFR Non African Amer 47 (L) >60 mL/min/1.73    Globulin 2.9 gm/dL    A/G Ratio 1.6 g/dL    BUN/Creatinine Ratio 17.5 7.0 - 25.0    Anion Gap 11.0 5.0 - 15.0 mmol/L   Magnesium   Result Value Ref Range    Magnesium 1.8 1.6 - 2.4 mg/dL   Troponin   Result Value Ref Range    Troponin T <0.010 0.000 - 0.030 ng/mL   CBC Auto Differential   Result Value Ref Range    WBC 7.91 3.40 - 10.80 10*3/mm3    RBC 4.39 4.14 - 5.80 10*6/mm3    Hemoglobin 13.2 13.0 - 17.7 g/dL    Hematocrit 40.2 37.5 - 51.0 %    MCV 91.6 79.0 - 97.0 fL    MCH 30.1 26.6 - 33.0 pg    MCHC 32.8 31.5 - 35.7 g/dL    RDW 14.2 12.3 - 15.4 %    RDW-SD 47.7 37.0 - 54.0 fl    MPV 10.7 6.0 - 12.0 fL    Platelets 173 140 - 450 10*3/mm3    Neutrophil % 58.2 42.7 - 76.0 %    Lymphocyte % 30.5 19.6 - 45.3 %    Monocyte % 8.5 5.0 - 12.0 %    Eosinophil % 2.0 0.3 - 6.2 %    Basophil % 0.5 0.0 - 1.5 %    Immature Grans % 0.3 0.0 - 0.5 %    Neutrophils, Absolute 4.61 1.70 - 7.00 10*3/mm3    Lymphocytes, Absolute 2.41 0.70 - 3.10 10*3/mm3    Monocytes, Absolute 0.67 0.10 - 0.90 10*3/mm3    Eosinophils, Absolute 0.16 0.00 - 0.40 10*3/mm3    Basophils, Absolute 0.04 0.00 - 0.20 10*3/mm3    Immature Grans, Absolute 0.02 0.00 - 0.05 10*3/mm3    nRBC 0.0 0.0 - 0.2 /100 WBC   Urinalysis With Microscopic If Indicated (No Culture) - Urine, Clean Catch   Result Value Ref Range    Color, UA Yellow Yellow, Straw    Appearance, UA Clear Clear    pH, UA <=5.0 5.0 - 8.0    Specific Gravity, UA 1.019 1.001 - 1.030    Glucose, UA  Negative Negative    Ketones, UA Negative Negative    Bilirubin, UA Negative Negative    Blood, UA Negative Negative    Protein, UA Negative Negative    Leuk Esterase, UA Small (1+) (A) Negative    Nitrite, UA Negative Negative    Urobilinogen, UA 0.2 E.U./dL 0.2 - 1.0 E.U./dL   BNP   Result Value Ref Range    proBNP 372.0 5.0-1,800.0 pg/mL   Urinalysis, Microscopic Only - Urine, Clean Catch   Result Value Ref Range    RBC, UA None Seen None Seen, 0-2 /HPF    WBC, UA 0-2 None Seen, 0-2 /HPF    Bacteria, UA None Seen None Seen, Trace /HPF    Squamous Epithelial Cells, UA 0-2 None Seen, 0-2 /HPF    Hyaline Casts, UA 0-6 0 - 6 /LPF    Methodology Automated Microscopy    Troponin   Result Value Ref Range    Troponin T <0.010 0.000 - 0.030 ng/mL   Light Blue Top   Result Value Ref Range    Extra Tube hold for add-on    Green Top (Gel)   Result Value Ref Range    Extra Tube Hold for add-ons.    Lavender Top   Result Value Ref Range    Extra Tube hold for add-on    Gold Top - SST   Result Value Ref Range    Extra Tube Hold for add-ons.          Assessment/Plan       Ray was seen today for hypertension.    Diagnoses and all orders for this visit:    Chest pressure  -     Ambulatory Referral to Cardiology  -     CT angiogram carotids; Future    Palpitation  -     Ambulatory Referral to Cardiology  -     CT angiogram carotids; Future    Vertigo  -     Ambulatory Referral to Cardiology  -     CT angiogram carotids; Future  -     allopurinol (ZYLOPRIM) 100 MG tablet; Take 1 tablet by mouth Daily.    Essential hypertension  -     irbesartan-hydrochlorothiazide (AVALIDE) 300-12.5 MG tablet; Take 1 tablet by mouth Daily.    PVD (peripheral vascular disease) (CMS/[facility])  -     CT angiogram carotids; Future    Chronic renal impairment, stage 2 (mild)  -     Basic Metabolic Panel    Idiopathic gout of foot, unspecified chronicity, unspecified laterality  -     allopurinol (ZYLOPRIM) 100 MG tablet; Take 1 tablet by mouth  Daily.    Rickettsioses  -     allopurinol (ZYLOPRIM) 100 MG tablet; Take 1 tablet by mouth Daily.    Fatigue, unspecified type  -     allopurinol (ZYLOPRIM) 100 MG tablet; Take 1 tablet by mouth Daily.    Weakness generalized  -     allopurinol (ZYLOPRIM) 100 MG tablet; Take 1 tablet by mouth Daily.    Elevated PSA  -     allopurinol (ZYLOPRIM) 100 MG tablet; Take 1 tablet by mouth Daily.    Other orders  -     Fluad Tri 65yr ([id number])      Return in about 1 week (around 9/27/2019).          There are no Patient Instructions on file for this visit.     Yobany Nolasco MD    Assessment/Plan

## 2019-09-23 ENCOUNTER — PATIENT OUTREACH (OUTPATIENT)
Dept: CASE MANAGEMENT | Facility: OTHER | Age: 84
End: 2019-09-23

## 2019-09-23 ENCOUNTER — HOSPITAL ENCOUNTER (OUTPATIENT)
Dept: CT IMAGING | Facility: HOSPITAL | Age: 84
Discharge: HOME | End: 2019-09-23
Admitting: INTERNAL MEDICINE

## 2019-09-23 DIAGNOSIS — R00.2 PALPITATION: ICD-10-CM

## 2019-09-23 DIAGNOSIS — R07.89 CHEST PRESSURE: ICD-10-CM

## 2019-09-23 DIAGNOSIS — I73.9 PVD (PERIPHERAL VASCULAR DISEASE) (HCC): ICD-10-CM

## 2019-09-23 DIAGNOSIS — R42 VERTIGO: ICD-10-CM

## 2019-09-23 PROCEDURE — 70498 CT ANGIOGRAPHY NECK: CPT

## 2019-09-23 PROCEDURE — 0 IOPAMIDOL PER 1 ML: Performed by: INTERNAL MEDICINE

## 2019-09-23 RX ADMIN — IOPAMIDOL 100 ML: 510 INJECTION, SOLUTION INTRAVASCULAR at 17:35

## 2019-09-23 NOTE — OUTREACH NOTE
Patient Outreach Note    Called patient in follow up to  ED visit 9-19-19 with syncope symptoms and weakness. Explained role of Care Advisor and contact information given to patient. Patient stated he is doing okay; he saw his PCP on Friday (9-20-19); he has an appt at the hospital for a test today. Reviewed Gaps in Care (Flu vaccine) and need to schedule Annual Wellness Visit. Patient said he received the flu shot at PCP on Friday, 9/20; said he would discuss AWV with PCP. No questions or concerns voiced at this time. Patient declined [facility] offer of the [employer] 1-800# for Care Management Nurse; stated he has all their numbers.  Conversation brief, per patient    Nahomy Avendaño RN  Community Care Coordinator    9/23/2019, 2:36 PM

## 2019-09-27 ENCOUNTER — EPISODE CHANGES (OUTPATIENT)
Dept: CASE MANAGEMENT | Facility: OTHER | Age: 84
End: 2019-09-27

## 2019-09-27 ENCOUNTER — CONSULT (OUTPATIENT)
Dept: CARDIOLOGY | Facility: CLINIC | Age: 84
End: 2019-09-27

## 2019-09-27 VITALS
WEIGHT: 198 LBS | DIASTOLIC BLOOD PRESSURE: 60 MMHG | HEIGHT: 71 IN | SYSTOLIC BLOOD PRESSURE: 110 MMHG | HEART RATE: 74 BPM | BODY MASS INDEX: 27.72 KG/M2 | OXYGEN SATURATION: 99 %

## 2019-09-27 DIAGNOSIS — R53.83 FATIGUE, UNSPECIFIED TYPE: ICD-10-CM

## 2019-09-27 DIAGNOSIS — H81.10 BENIGN PAROXYSMAL POSITIONAL VERTIGO, UNSPECIFIED LATERALITY: ICD-10-CM

## 2019-09-27 DIAGNOSIS — R00.2 PALPITATIONS: Primary | ICD-10-CM

## 2019-09-27 DIAGNOSIS — I10 ESSENTIAL HYPERTENSION: ICD-10-CM

## 2019-09-27 PROCEDURE — 99214 OFFICE O/P EST MOD 30 MIN: CPT | Performed by: INTERNAL MEDICINE

## 2019-09-27 RX ORDER — CARVEDILOL 3.12 MG/1
TABLET ORAL
Qty: 60 TABLET | Refills: 11 | Status: SHIPPED | OUTPATIENT
Start: 2019-09-27 | End: 2020-10-21

## 2019-09-27 NOTE — PROGRESS NOTES
"     [facility] Cardiology OP Consult Note    Sukumar Cosby  [id number]  2019    Referred By: Yobany Nolasco MD    Chief Complaint: Weakness    History of Present Illness:   Mr. Sukumar Cosby is a 85 y.o. male who presents to the Cardiology Clinic for evaluation of weakness.  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  He does not have any significant past cardiac history.  He presents to the cardiology clinic for evaluation of weakness.  The patient reports a long history of weakness, which she believes is been progressive.  He describes his weakness as a general lack of energy.  States he has typically been an active person, however he now lacks the energy to do activities he previously enjoyed doing.  He denies any exertional chest pain or chest discomfort.  No exertional dyspnea.  He does report an intermittent dull chest discomfort in the morning upon awakening, which is short-lived and improves after getting out of bed and ambulating.  He denies orthopnea, PND, or lower extremity edema.  He does report frequent episodes of a \"fluttering\" sensation with a rapid heart rate at that time.  The episodes are also brief, resolving spontaneously within several minutes.  He denies any associated dizziness or lightheadedness.  No prior syncopal episodes.  He denies any heat or cold intolerance.  No other complaints at this time.    Past Cardiac Testin. Last Coronary Angio: None  2. Prior Stress Testing: None  3. Last Echo: 2018   -LVEF 60-65%   -Trace mitral regurgitation   -Mild tricuspid regurgitation, RVSP 35 mmHg  4. Prior Holter Monitor: None    Review of Systems:   Review of Systems   Constitutional: Positive for fatigue. Negative for activity change, appetite change, chills, diaphoresis, fever, unexpected weight gain and unexpected weight loss.   Respiratory: Positive for chest tightness. Negative for " cough, shortness of breath and wheezing.    Cardiovascular: Negative for chest pain, palpitations and leg swelling.   Gastrointestinal: Negative for abdominal pain, anal bleeding, blood in stool and GERD.   Endocrine: Negative for cold intolerance and heat intolerance.   Genitourinary: Negative for hematuria.   Neurological: Positive for dizziness and weakness. Negative for syncope and light-headedness.   Hematological: Does not bruise/bleed easily.   Psychiatric/Behavioral: Negative for depressed mood and stress. The patient is not nervous/anxious.        Past Medical History:   Past Medical History:   Diagnosis Date   • Arthritis    • Colon polyps    • Depression    • Fluid in knee    • Frequent UTI    • Gallstones    • GERD (gastroesophageal reflux disease)    • History of anemia    • History of blood transfusion    • History of bronchitis    • History of colonic polyps    • History of fibromyalgia    • History of gallstones    • Hypertension    • Prostate cancer (CMS/HCC)    • Sinusitis        Past Surgical History:   Past Surgical History:   Procedure Laterality Date   • CHOLECYSTECTOMY  2005   • COLONOSCOPY  2013   • UPPER GASTROINTESTINAL ENDOSCOPY  2013       Family History:   Family History   Problem Relation Age of Onset   • Cancer Mother    • Migraines Mother    • Osteoporosis Mother        Social History:   Social History     Socioeconomic History   • Marital status:      Spouse name: Not on file   • Number of children: Not on file   • Years of education: Not on file   • Highest education level: Not on file   Tobacco Use   • Smoking status: Never Smoker   • Smokeless tobacco: Never Used   Substance and Sexual Activity   • Alcohol use: No   • Drug use: No   • Sexual activity: Defer       Medications:     Current Outpatient Medications:   •  albuterol (VENTOLIN HFA) 108 (90 BASE) MCG/ACT inhaler, Ventolin  (90 Base) MCG/ACT Inhalation Aerosol Solution; Patient Sig: Ventolin  (90 Base)  "MCG/ACT Inhalation Aerosol Solution ; 18; 0; 24-May-2013; Active, Disp: , Rfl:   •  amLODIPine (NORVASC) 5 MG tablet, Take 1 tablet by mouth Daily., Disp: 90 tablet, Rfl: 3  •  B-12, Methylcobalamin, 1000 MCG sublingual tablet, Place 1 tablet under the tongue Daily., Disp: 90 tablet, Rfl: 3  •  carvedilol (COREG) 3.125 MG tablet, Take 3.125 mg by mouth 2 (Two) Times a Day With Meals., Disp: , Rfl: 11  •  irbesartan-hydrochlorothiazide (AVALIDE) 300-12.5 MG tablet, Take 1 tablet by mouth Daily., Disp: 90 tablet, Rfl: 3  •  meclizine 25 MG chewable tablet chewable tablet, Chew 1 tablet Every 8 (Eight) Hours., Disp: 30 tablet, Rfl: 4  •  triamcinolone (KENALOG) 0.1 % cream, Apply  topically to the appropriate area as directed 2 (Two) Times a Day. Arms, torso/ chest, Disp: 45 g, Rfl: 0  •  allopurinol (ZYLOPRIM) 100 MG tablet, Take 1 tablet by mouth Daily., Disp: 30 tablet, Rfl: 11    Allergies:   No Known Allergies    Physical Exam:  Vital Signs:   Vitals:    09/27/19 0942 09/27/19 0955 09/27/19 0956   BP: 134/72 134/74 110/60   BP Location: Right arm Left arm Right arm   Patient Position: Sitting Sitting Standing   Cuff Size: Adult Adult Adult   Pulse: 74     SpO2: 99%     Weight: 89.8 kg (198 lb)     Height: 180.3 cm (71\")         Physical Exam   Constitutional: He is oriented to person, place, and time. He appears well-developed and well-nourished. No distress.   Elderly male in no acute distress.   HENT:   Head: Normocephalic and atraumatic.   Moist Mucous Membranes.    Eyes: EOM are normal. Pupils are equal, round, and reactive to light. No scleral icterus.   Neck: No tracheal deviation present.   Cardiovascular: Normal rate, regular rhythm, normal heart sounds and intact distal pulses. Exam reveals no gallop and no friction rub.   No murmur heard.  Normal JVD.     Pulmonary/Chest: Effort normal and breath sounds normal. No stridor. No respiratory distress. He has no wheezes. He has no rales. He exhibits no " tenderness.   Abdominal: Soft. Bowel sounds are normal. He exhibits no distension. There is no tenderness. There is no rebound and no guarding.   Musculoskeletal: Normal range of motion. He exhibits no edema.   Lymphadenopathy:     He has no cervical adenopathy.   Neurological: He is alert and oriented to person, place, and time.   Skin: Skin is warm and dry. He is not diaphoretic. No erythema.   Psychiatric: He has a normal mood and affect. His behavior is normal.       Results Review:    I reviewed the patient's new clinical results.  I personally viewed and interpreted the patient's EKG/Telemetry data    ECG 9/19/2019: Sinus bradycardia at 57 bpm.  First-degree AV block.  Normal axis.  No specific ST changes.    Assessment / Plan:   Ray was seen today for advice only, dizziness, chest pain and shortness of breath.    Diagnoses and all orders for this visit:    1.  Fatigue / Weakness  --Fatigue and weakness appears to be a chronic issue  --On review of records, the patient has previously undergone cardiac evaluation with an unremarkable echocardiogram approximately 1 year ago  --Prior TSH is within normal limits  --Most recent labs did not show significant anemia  --Patient does have moderate obstructive sleep apnea noted on a prior sleep study, which may be a potential etiology for his fatigue  --Given the patient believes his fatigue is now somewhat worse, will repeat echocardiogram to evaluate for any significant changes  --Given reported episodes of palpitations, will obtain 48-hour Holter monitor to rule out underlying paroxysmal arrhythmia which could contribute to fatigue  --As the patient does not appear to have anginal symptoms at this time, will defer stress testing and reconsider if there is significant changes on his echocardiogram  --Will schedule follow-up in 1 month to review results of his cardiac testing    2.  Palpitations  --Will obtain 48-hour Holter monitor to rule out underlying  arrhythmia    3.  Essential hypertension  --Remains well controlled    4.  Benign paroxysmal positional vertigo  --Long history of BPV, which may be contributing to his fatigue      Preventative Cardiology:   Tobacco Cessation: N/A  Obstructive Sleep Apnea Screening: Previously completed  AAA Screening: N/A  Peripheral Arterial Disease Screening: N/A    Follow Up:   Return in about 1 month (around 10/27/2019).    Thank you for allowing me to participate in the care of your patient. Please to not hesitate to contact me with additional questions or concerns.     JERMAINE Brown MD  Interventional Cardiology   09/27/2019  9:32 AM

## 2019-10-28 ENCOUNTER — TELEPHONE (OUTPATIENT)
Dept: CARDIOLOGY | Facility: CLINIC | Age: 84
End: 2019-10-28

## 2019-10-28 NOTE — TELEPHONE ENCOUNTER
----- Message from David Brown MD sent at 10/26/2019  3:23 PM EDT -----  Please let the patient know overall his echocardiogram looked good. The echo did show evidence of diastolic dysfunction, which we will discuss at his next follow-up visit. We will discuss the results of his monitor at the time of his follow-up.     Thanks.

## 2019-10-29 ENCOUNTER — OFFICE VISIT (OUTPATIENT)
Dept: CARDIOLOGY | Facility: CLINIC | Age: 84
End: 2019-10-29

## 2019-10-29 VITALS
OXYGEN SATURATION: 97 % | BODY MASS INDEX: 27.72 KG/M2 | DIASTOLIC BLOOD PRESSURE: 76 MMHG | HEART RATE: 68 BPM | HEIGHT: 71 IN | SYSTOLIC BLOOD PRESSURE: 128 MMHG | WEIGHT: 198 LBS

## 2019-10-29 DIAGNOSIS — M79.605 BILATERAL LEG PAIN: Primary | ICD-10-CM

## 2019-10-29 DIAGNOSIS — I10 ESSENTIAL HYPERTENSION: ICD-10-CM

## 2019-10-29 DIAGNOSIS — R53.83 FATIGUE, UNSPECIFIED TYPE: ICD-10-CM

## 2019-10-29 DIAGNOSIS — M79.604 BILATERAL LEG PAIN: Primary | ICD-10-CM

## 2019-10-29 DIAGNOSIS — I73.9 CLAUDICATION (HCC): ICD-10-CM

## 2019-10-29 PROCEDURE — 99213 OFFICE O/P EST LOW 20 MIN: CPT | Performed by: INTERNAL MEDICINE

## 2019-10-29 NOTE — PROGRESS NOTES
"             [facility] Cardiology Office Follow Up Note    Sukumar Cosby  [id number]  10/29/2019    Primary care provider: Yobany Nolasco MD    Chief Complaint: Follow-up for fatigue and weakness    History of Present Illness:   Mr. Sukumar Cosby is a 85 y.o. male who presents to the Cardiology Clinic for follow-up of fatigue and weakness.  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  He does not have any significant past cardiac history.    Patient's past medical history is also significant for chronic fatigue and weakness, for which he is undergone multiple evaluations in the past.  He initially presented to the cardiology clinic in  for reevaluation of fatigue and weakness.  The patient subsequently underwent an echocardiogram, which showed normal LV systolic function, grade 1 diastolic dysfunction, and no significant valvular abnormalities.  He is undergone an outpatient cardiac monitor, which is currently pending.  He presents today for follow-up.  Since his last appointment, the patient reports continued fatigue and weakness, which is no worse compared to his last appointment.  He reports his fatigue and weakness has been stable for at least several years.  His chief complaint today, however, his chronic bilateral lower extremity pain.  He reports discomfort in his bilateral thighs, which occurs both at rest and during exertion.  He reports his lower extremity discomfort causes him to feel \"wobbly\" during exertion.  He has not sustained any falls.  He does not notice significant worsening of his lower extremity discomfort during ambulation.  No history of lower extremity ulceration.  He continues to deny exertional chest pain, chest discomfort, or exertional dyspnea.  No orthopnea, PND, or lower extremity swelling.  No other new complaints at this time.    Past Cardiac Testin. Last Coronary Angio: None  2. Prior " Stress Testing:  Remote  3. Last Echo:  10/16/2019   1.  Normal left ventricular size and systolic function, LVEF 55-60%.   2.  Mild concentric LVH.   3.  Impaired LV diastolic filling consistent with grade 1 diastolic dysfunction.   4.  Mild left atrial dilation.   5.  Normal right ventricular size and systolic function.   6.  Trace MR and TR.  4. Prior Holter Monitor: Pending     Review of Systems:   Review of Systems   Constitutional: Positive for fatigue. Negative for activity change, appetite change, chills, diaphoresis, fever, unexpected weight gain and unexpected weight loss.   Respiratory: Negative for cough, chest tightness, shortness of breath and wheezing.    Cardiovascular: Negative for chest pain, palpitations and leg swelling.   Gastrointestinal: Negative for abdominal pain, anal bleeding, blood in stool and GERD.   Endocrine: Negative for cold intolerance and heat intolerance.   Genitourinary: Negative for hematuria.   Musculoskeletal: Positive for arthralgias, gait problem and myalgias.   Neurological: Positive for weakness. Negative for dizziness, syncope and light-headedness.   Hematological: Does not bruise/bleed easily.   Psychiatric/Behavioral: Negative for depressed mood and stress. The patient is not nervous/anxious.        I have reviewed and/or updated the patient's past medical, past surgical, family, social history, problem list and allergies as appropriate.     Medications:     Current Outpatient Medications:   •  albuterol (VENTOLIN HFA) 108 (90 BASE) MCG/ACT inhaler, Ventolin  (90 Base) MCG/ACT Inhalation Aerosol Solution; Patient Sig: Ventolin  (90 Base) MCG/ACT Inhalation Aerosol Solution ; 18; 0; 24-May-2013; Active, Disp: , Rfl:   •  allopurinol (ZYLOPRIM) 100 MG tablet, Take 1 tablet by mouth Daily., Disp: 30 tablet, Rfl: 11  •  amLODIPine (NORVASC) 5 MG tablet, Take 1 tablet by mouth Daily., Disp: 90 tablet, Rfl: 3  •  B-12, Methylcobalamin, 1000 MCG sublingual  "tablet, Place 1 tablet under the tongue Daily., Disp: 90 tablet, Rfl: 3  •  carvedilol (COREG) 3.125 MG tablet, TAKE ONE TABLET BY MOUTH TWICE DAILY WITH MEALS, Disp: 60 tablet, Rfl: 11  •  irbesartan-hydrochlorothiazide (AVALIDE) 300-12.5 MG tablet, Take 1 tablet by mouth Daily., Disp: 90 tablet, Rfl: 3  •  meclizine 25 MG chewable tablet chewable tablet, Chew 1 tablet Every 8 (Eight) Hours., Disp: 30 tablet, Rfl: 4  •  triamcinolone (KENALOG) 0.1 % cream, Apply  topically to the appropriate area as directed 2 (Two) Times a Day. Arms, torso/ chest, Disp: 45 g, Rfl: 0    Physical Exam:  Vital Signs:   Vitals:    10/29/19 1054   BP: 128/76   BP Location: Right arm   Patient Position: Sitting   Cuff Size: Adult   Pulse: 68   SpO2: 97%   Weight: 89.8 kg (198 lb)   Height: 180.3 cm (71\")       Physical Exam   Constitutional: He is oriented to person, place, and time. He appears well-developed and well-nourished.   Elderly male in no acute distress.   HENT:   Head: Normocephalic and atraumatic.   Moist Mucous Membranes.    Eyes: EOM are normal. Pupils are equal, round, and reactive to light. No scleral icterus.   Neck: No tracheal deviation present.   Cardiovascular: Normal rate, regular rhythm, normal heart sounds and intact distal pulses. Exam reveals no gallop and no friction rub.   No murmur heard.  Normal JVD.  2+ bilateral AT and DP pulses.   Pulmonary/Chest: Effort normal and breath sounds normal. No stridor. No respiratory distress. He has no wheezes. He has no rales. He exhibits no tenderness.   Abdominal: Soft. Bowel sounds are normal. He exhibits no distension. There is no tenderness. There is no rebound and no guarding.   Musculoskeletal: Normal range of motion. He exhibits no edema.   Lymphadenopathy:     He has no cervical adenopathy.   Neurological: He is alert and oriented to person, place, and time.   Skin: Skin is warm and dry. He is not diaphoretic. No erythema.   Psychiatric: He has a normal mood and " affect. His behavior is normal.       Results Review:   I reviewed the patient's new clinical results.        Assessment / Plan:   1.  Fatigue / Weakness  --Fatigue and weakness appears to be a chronic issue  --On review of records, the patient has undergone several prior evaluations with no clear etiology for his fatigue/weakness  --Prior TSH is within normal limits  --Significant anemia or abnormalities on recent labs  --Patient does have moderate obstructive sleep apnea noted on a prior sleep study, which may be a potential etiology for his fatigue  --Repeat echocardiogram shows normal LV systolic function, grade 1 diastolic dysfunction, no significant valvular abnormalities  --Outpatient cardiac monitor pending to rule out underlying paroxysmal arrhythmias which could contribute to fatigue  --The patient does not endorse anginal symptoms, however given the possibility fatigue being an anginal equivalent, discussed proceeding with pharmacologic MPS, however given the stability of the patient's symptoms and lack of chest pain he has elected to defer ischemic evaluation at this time     2.  Palpitations  --Outpatient cardiac monitor currently pending    3.  Bilateral leg pain  --History of bilateral lower extremity discomfort, appears to be worsening over the past several months  --Etiology currently unclear, symptoms not entirely consistent with claudication  --2+ bilateral lower extremity pulses on exam today  --Will proceed with bilateral lower extremity ABIs to further rule out PAD as contributing to his current symptoms  --If ABIs are unremarkable, would then consider alternate etiologies such as neuropathy (the patient does appear to have a history of neuropathy)    4. Essential hypertension  --Remains well controlled     5.  Benign paroxysmal positional vertigo  --Long history of BPV, which may be contributing to his fatigue        Preventative Cardiology:   Tobacco Cessation: N/A  Obstructive Sleep Apnea  Screening: Previously completed  AAA Screening: N/A  Peripheral Arterial Disease Screening: N/A    Follow Up:   No Follow-up on file.    Thank you for allowing me to participate in the care of your patient. Please to not hesitate to contact me with additional questions or concerns.     Please note, this document was produced using voice recognition software.       JERMAINE Brown MD  Interventional Cardiology   10/29/2019  11:15 AM

## 2019-10-31 ENCOUNTER — HOSPITAL ENCOUNTER (OUTPATIENT)
Dept: ULTRASOUND IMAGING | Facility: HOSPITAL | Age: 84
Discharge: HOME | End: 2019-10-31
Admitting: INTERNAL MEDICINE

## 2019-10-31 DIAGNOSIS — M79.604 BILATERAL LEG PAIN: ICD-10-CM

## 2019-10-31 DIAGNOSIS — M79.605 BILATERAL LEG PAIN: ICD-10-CM

## 2019-10-31 DIAGNOSIS — I73.9 CLAUDICATION (HCC): ICD-10-CM

## 2019-10-31 PROCEDURE — 93923 UPR/LXTR ART STDY 3+ LVLS: CPT

## 2019-11-06 ENCOUNTER — TELEPHONE (OUTPATIENT)
Dept: CARDIOLOGY | Facility: CLINIC | Age: 84
End: 2019-11-06

## 2019-11-06 NOTE — TELEPHONE ENCOUNTER
----- Message from David Brown MD sent at 11/4/2019 11:07 AM EST -----  Please let the patient know the ultrasound of his legs generally looked OK.  No further testing of his lower extremity arteries are necessary at this time.  We will discuss results in more detail at the time of his next follow-up.    Thanks.

## 2019-11-11 RX ORDER — MECLIZINE HCL 25 MG/1
TABLET, CHEWABLE ORAL
Qty: 30 TABLET | Refills: 4 | Status: SHIPPED | OUTPATIENT
Start: 2019-11-11 | End: 2020-06-10 | Stop reason: SDUPTHER

## 2019-11-21 ENCOUNTER — TELEPHONE (OUTPATIENT)
Dept: CARDIOLOGY | Facility: CLINIC | Age: 84
End: 2019-11-21

## 2019-11-21 NOTE — TELEPHONE ENCOUNTER
Patient notified of holter results that showed some extra beats, but nothing that requires treatment. Dr. Brown will see patient at his next scheduled appt. Patient states understanding.

## 2020-05-05 RX ORDER — MECLIZINE HCL 25 MG/1
TABLET, CHEWABLE ORAL
Qty: 30 TABLET | Refills: 4 | OUTPATIENT
Start: 2020-05-05

## 2020-06-10 ENCOUNTER — OFFICE VISIT (OUTPATIENT)
Dept: INTERNAL MEDICINE | Facility: CLINIC | Age: 85
End: 2020-06-10

## 2020-06-10 VITALS
TEMPERATURE: 98.2 F | WEIGHT: 191.8 LBS | HEART RATE: 64 BPM | BODY MASS INDEX: 26.85 KG/M2 | SYSTOLIC BLOOD PRESSURE: 142 MMHG | RESPIRATION RATE: 16 BRPM | HEIGHT: 71 IN | OXYGEN SATURATION: 98 % | DIASTOLIC BLOOD PRESSURE: 60 MMHG

## 2020-06-10 DIAGNOSIS — R30.0 DYSURIA: Primary | ICD-10-CM

## 2020-06-10 DIAGNOSIS — I10 ESSENTIAL HYPERTENSION: ICD-10-CM

## 2020-06-10 DIAGNOSIS — R41.3 MEMORY LOSS: ICD-10-CM

## 2020-06-10 DIAGNOSIS — Z00.00 ROUTINE GENERAL MEDICAL EXAMINATION AT A HEALTH CARE FACILITY: ICD-10-CM

## 2020-06-10 DIAGNOSIS — Z12.5 PROSTATE CANCER SCREENING: ICD-10-CM

## 2020-06-10 DIAGNOSIS — M19.90 ARTHRITIS: ICD-10-CM

## 2020-06-10 DIAGNOSIS — N18.2 CRI (CHRONIC RENAL INSUFFICIENCY), STAGE 2 (MILD): ICD-10-CM

## 2020-06-10 LAB
BILIRUB BLD-MCNC: NEGATIVE MG/DL
CLARITY, POC: CLEAR
COLOR UR: YELLOW
GLUCOSE UR STRIP-MCNC: NEGATIVE MG/DL
KETONES UR QL: NEGATIVE
LEUKOCYTE EST, POC: NEGATIVE
NITRITE UR-MCNC: NEGATIVE MG/ML
PH UR: 6 [PH] (ref 5–8)
PROT UR STRIP-MCNC: NEGATIVE MG/DL
RBC # UR STRIP: NEGATIVE /UL
SP GR UR: 1.01 (ref 1–1.03)
UROBILINOGEN UR QL: NORMAL

## 2020-06-10 PROCEDURE — 96160 PT-FOCUSED HLTH RISK ASSMT: CPT | Performed by: INTERNAL MEDICINE

## 2020-06-10 PROCEDURE — 81003 URINALYSIS AUTO W/O SCOPE: CPT | Performed by: INTERNAL MEDICINE

## 2020-06-10 PROCEDURE — 99214 OFFICE O/P EST MOD 30 MIN: CPT | Performed by: INTERNAL MEDICINE

## 2020-06-10 PROCEDURE — G0439 PPPS, SUBSEQ VISIT: HCPCS | Performed by: INTERNAL MEDICINE

## 2020-06-10 PROCEDURE — 99397 PER PM REEVAL EST PAT 65+ YR: CPT | Performed by: INTERNAL MEDICINE

## 2020-06-10 RX ORDER — AMOXICILLIN AND CLAVULANATE POTASSIUM 875; 125 MG/1; MG/1
1 TABLET, FILM COATED ORAL EVERY 12 HOURS SCHEDULED
Qty: 20 TABLET | Refills: 0 | OUTPATIENT
Start: 2020-06-10 | End: 2020-06-30

## 2020-06-10 RX ORDER — MECLIZINE HCL 25MG 25 MG/1
25 TABLET, CHEWABLE ORAL EVERY 8 HOURS SCHEDULED
Qty: 30 TABLET | Refills: 12 | Status: SHIPPED | OUTPATIENT
Start: 2020-06-10 | End: 2021-06-18

## 2020-06-10 NOTE — PROGRESS NOTES
QUICK REFERENCE INFORMATION:  The ABCs of the Annual Wellness Visit    Medicare Annual Wellness Visit    Subjective   History of Present Illness    Sukumar Cosby is a 86 y.o. male who presents for an Annual Wellness Visit. In addition, we addressed the following health issues: htn, cri, dysuria,        Chronic pain 5/10        PMH, PSH, SocHx, FamHx, Allergies, and Medications: Reviewed and updated.     Outpatient Medications Prior to Visit   Medication Sig Dispense Refill   • albuterol (VENTOLIN HFA) 108 (90 BASE) MCG/ACT inhaler Ventolin  (90 Base) MCG/ACT Inhalation Aerosol Solution; Patient Sig: Ventolin  (90 Base) MCG/ACT Inhalation Aerosol Solution ; 18; 0; 24-May-2013; Active     • allopurinol (ZYLOPRIM) 100 MG tablet Take 1 tablet by mouth Daily. 30 tablet 11   • amLODIPine (NORVASC) 5 MG tablet Take 1 tablet by mouth Daily. 90 tablet 3   • B-12, Methylcobalamin, 1000 MCG sublingual tablet Place 1 tablet under the tongue Daily. 90 tablet 3   • carvedilol (COREG) 3.125 MG tablet TAKE ONE TABLET BY MOUTH TWICE DAILY WITH MEALS 60 tablet 11   • irbesartan-hydrochlorothiazide (AVALIDE) 300-12.5 MG tablet Take 1 tablet by mouth Daily. 90 tablet 3   • triamcinolone (KENALOG) 0.1 % cream Apply  topically to the appropriate area as directed 2 (Two) Times a Day. Arms, torso/ chest 45 g 0   • TRAVEL SICKNESS 25 MG chewable tablet chewable tablet CHEW ONE TABLET BY MOUTH EVERY 8 HOURS 30 tablet 4     No facility-administered medications prior to visit.        Patient Active Problem List   Diagnosis   • Arthritis   • Ataxic gait   • BMI 30.0-30.9,adult   • BPPV (benign paroxysmal positional vertigo)   • Chronic pain   • Chronic kidney disease   • Depression   • Enlarged prostate without lower urinary tract symptoms (luts)   • GERD without esophagitis   • Gout   • Hypertension   • Hypogonadism in male   • Lumbar radiculopathy   • Peripheral neuropathy   • Restless legs syndrome   • Spinal stenosis   • Ventral  hernia   • Polymyositis (CMS/HCC)   • Prostate CA (CMS/HCC)       Health Habits:  Dental Exam. up to date  Eye Exam. not up to date - will get done  No exam data present  Exercise: 2 times/week.  Current exercise activities include: walking    Health Risk Assessment:  The patient has completed a Health Risk Assessment. This has been reviewed with them and has been scanned into the patient's chart.        Current Medical Providers:  Patient Care Team:  Yobany Nolasco MD as PCP - Doris Barba MD as Surgeon (General Surgery)    The [facility] providers who are involved in the care of this patient are listed above. Additional providers and suppliers are listed below:      Recent Hospitalizations:  No hospitalization(s) within the last year..    Recent Lab Results:  CMP:  Lab Results   Component Value Date    GLU 99 (H) 03/21/2019    BUN 25 (H) 09/19/2019    CREATININE 1.43 (H) 09/19/2019    EGFRIFNONA 47 (L) 09/19/2019    EGFRIFAFRI 77 03/21/2019    BCR 17.5 09/19/2019     09/19/2019    K 4.7 09/19/2019    CO2 29.0 09/19/2019    CALCIUM 9.5 09/19/2019    PROTENTOTREF 7.2 03/21/2019    ALBUMIN 4.60 09/19/2019    LABGLOBREF 2.8 03/21/2019    LABIL2 1.6 03/21/2019    BILITOT 0.4 09/19/2019    ALKPHOS 95 09/19/2019    AST 16 09/19/2019    ALT 14 09/19/2019       LIPID PANEL:  Lab Results   Component Value Date    CHLPL 190 03/21/2019    TRIG 158 (H) 03/21/2019    HDL 47 03/21/2019    VLDL 31.6 03/21/2019     (H) 03/21/2019       HbA1c:  No results found for: HGBA1C    URINE MICROALBUMIN:  No results found for: MICROALBUR, POCMALB    PSA:  Lab Results   Component Value Date    PSA 0.363 06/07/2018       Age-appropriate Screening Schedule:  Refer to the list below for future screening recommendations based on patient's age, sex and/or medical conditions. Orders for these recommended tests are listed in the plan section. The patient has been provided with a written plan.    Health Maintenance    Topic Date Due   • TDAP/TD VACCINES (1 - Tdap) 02/13/1945   • ZOSTER VACCINE (1 of 2) 02/13/1984   • INFLUENZA VACCINE  08/01/2020       Depression Screen:   PHQ-2/PHQ-9 Depression Screening 6/10/2020   Little interest or pleasure in doing things 0   Feeling down, depressed, or hopeless 0   Trouble falling or staying asleep, or sleeping too much 0   Feeling tired or having little energy 0   Poor appetite or overeating 0   Feeling bad about yourself - or that you are a failure or have let yourself or your family down 0   Trouble concentrating on things, such as reading the newspaper or watching television 0   Moving or speaking so slowly that other people could have noticed. Or the opposite - being so fidgety or restless that you have been moving around a lot more than usual 0   Thoughts that you would be better off dead, or of hurting yourself in some way -   Total Score 0         Functional and Cognitive Screening:  Functional & Cognitive Status 6/10/2020   Do you have difficulty preparing food and eating? No   Do you have difficulty bathing yourself, getting dressed or grooming yourself? No   Do you have difficulty using the toilet? No   Do you have difficulty moving around from place to place? No   Do you have trouble with steps or getting out of a bed or a chair? No   Current Diet Well Balanced Diet   Dental Exam Not up to date   Eye Exam Not up to date   Exercise (times per week) 2 times per week   Current Exercise Activities Include Walking   Do you need help using the phone?  No   Are you deaf or do you have serious difficulty hearing?  No   Do you need help with transportation? No   Do you need help shopping? No   Do you need help preparing meals?  No   Do you need help with housework?  No   Do you need help with laundry? No   Do you need help taking your medications? No   Do you need help managing money? No   Do you ever drive or ride in a car without wearing a seat belt? No   Have you felt unusual  "stress, anger or loneliness in the last month? No   Who do you live with? Alone   If you need help, do you have trouble finding someone available to you? No   Do you have difficulty concentrating, remembering or making decisions? No       Does the patient have evidence of cognitive impairment? No    Advanced Care Planning:  ACP discussion was held with the patient during this visit. Patient has an advance directive (not in EMR), copy requested.    Identification of Risk Factors:  Risk factors include: Advance Directive Discussion  Chronic Pain   Inactivity/Sedentary.    Review of Systems    Compared to one year ago, the patient feels his physical health is the same.  Compared to one year ago, the patient feels his mental health is the same.    Objective     Physical Exam    Vitals:    06/10/20 1028 06/10/20 1030   BP: 162/69 142/60   BP Location: Left arm Right arm   Patient Position: Sitting Sitting   Cuff Size: Adult Adult   Pulse: 66 64   Resp: 16 16   Temp: 98 °F (36.7 °C) 98.2 °F (36.8 °C)   TempSrc: Temporal Temporal   SpO2: 99% 98%   Weight: 87 kg (191 lb 12.8 oz) 87 kg (191 lb 12.8 oz)   Height: 180.3 cm (71\") 180.3 cm (71\")       Body mass index is 26.75 kg/m².  Discussed the patient's BMI with him. The BMI is in the acceptable range.    Assessment/Plan   Patient Self-Management and Personalized Health Advice  The patient has been provided with information about: diet, exercise, weight management and fall prevention and preventive services including:   · Annual Wellness Visit (AWV).    Visit Diagnoses:    ICD-10-CM ICD-9-CM   1. Dysuria R30.0 788.1   2. Essential hypertension I10 401.9   3. Prostate cancer screening Z12.5 V76.44   4. CRI (chronic renal insufficiency), stage 2 (mild) N18.2 585.2   5. Routine general medical examination at a health care facility Z00.00 V70.0       Orders Placed This Encounter   Procedures   • Basic Metabolic Panel   • Uric Acid   • Vitamin B12   • TSH   • T4, Free   • PSA " Screen   • CBC & Differential     Order Specific Question:   Manual Differential     Answer:   No       Outpatient Encounter Medications as of 6/10/2020   Medication Sig Dispense Refill   • albuterol (VENTOLIN HFA) 108 (90 BASE) MCG/ACT inhaler Ventolin  (90 Base) MCG/ACT Inhalation Aerosol Solution; Patient Sig: Ventolin  (90 Base) MCG/ACT Inhalation Aerosol Solution ; 18; 0; 24-May-2013; Active     • allopurinol (ZYLOPRIM) 100 MG tablet Take 1 tablet by mouth Daily. 30 tablet 11   • amLODIPine (NORVASC) 5 MG tablet Take 1 tablet by mouth Daily. 90 tablet 3   • amoxicillin-clavulanate (Augmentin) 875-125 MG per tablet Take 1 tablet by mouth Every 12 (Twelve) Hours. 20 tablet 0   • B-12, Methylcobalamin, 1000 MCG sublingual tablet Place 1 tablet under the tongue Daily. 90 tablet 3   • carvedilol (COREG) 3.125 MG tablet TAKE ONE TABLET BY MOUTH TWICE DAILY WITH MEALS 60 tablet 11   • irbesartan-hydrochlorothiazide (AVALIDE) 300-12.5 MG tablet Take 1 tablet by mouth Daily. 90 tablet 3   • meclizine (Travel Sickness) 25 MG chewable tablet chewable tablet Chew 1 tablet Every 8 (Eight) Hours. 30 tablet 12   • triamcinolone (KENALOG) 0.1 % cream Apply  topically to the appropriate area as directed 2 (Two) Times a Day. Arms, torso/ chest 45 g 0   • [DISCONTINUED] TRAVEL SICKNESS 25 MG chewable tablet chewable tablet CHEW ONE TABLET BY MOUTH EVERY 8 HOURS 30 tablet 4     No facility-administered encounter medications on file as of 6/10/2020.        Reviewed use of high risk medication in the elderly: yes  Reviewed for potential of harmful drug interactions in the elderly: yes    Follow Up:  Return in about 6 weeks (around 7/22/2020).     An After Visit Summary and PPPS with all of these plans were given to the patient.             Sukumar was seen today for dizziness and urinary tract infection.    Diagnoses and all orders for this visit:  Prostate cancer screening  -     PSA Screen      Routine general medical  examination at a health care facility

## 2020-06-10 NOTE — PROGRESS NOTES
Subjective     Patient ID: Sukumar Cosby is a 86 y.o. male. Patient is here for management of multiple medical problems.     Chief Complaint   Patient presents with   • Dizziness     patient having increased issues vertigo   • Urinary Tract Infection     patient having urinary frequency and burning with urination x 3 weeks      History of Present Illness     Dizziness occ vertigo.  No meds. rf ran out.   Pt staggering.      + dysuria.   + frequency.    Aching all over with memory loss. Mild but noticeable.  Aching 5/10 pain          The following portions of the patient's history were reviewed and updated as appropriate: allergies, current medications, past family history, past medical history, past social history, past surgical history and problem list.    Review of Systems   Constitutional: Negative for fatigue.   Neurological: Positive for dizziness.   Psychiatric/Behavioral: Negative for agitation, behavioral problems, decreased concentration, self-injury, sleep disturbance and suicidal ideas. The patient is not hyperactive.    All other systems reviewed and are negative.      Current Outpatient Medications:   •  albuterol (VENTOLIN HFA) 108 (90 BASE) MCG/ACT inhaler, Ventolin  (90 Base) MCG/ACT Inhalation Aerosol Solution; Patient Sig: Ventolin  (90 Base) MCG/ACT Inhalation Aerosol Solution ; 18; 0; 24-May-2013; Active, Disp: , Rfl:   •  allopurinol (ZYLOPRIM) 100 MG tablet, Take 1 tablet by mouth Daily., Disp: 30 tablet, Rfl: 11  •  amLODIPine (NORVASC) 5 MG tablet, Take 1 tablet by mouth Daily., Disp: 90 tablet, Rfl: 3  •  amoxicillin-clavulanate (Augmentin) 875-125 MG per tablet, Take 1 tablet by mouth Every 12 (Twelve) Hours., Disp: 20 tablet, Rfl: 0  •  B-12, Methylcobalamin, 1000 MCG sublingual tablet, Place 1 tablet under the tongue Daily., Disp: 90 tablet, Rfl: 3  •  carvedilol (COREG) 3.125 MG tablet, TAKE ONE TABLET BY MOUTH TWICE DAILY WITH MEALS, Disp: 60 tablet, Rfl: 11  •   "irbesartan-hydrochlorothiazide (AVALIDE) 300-12.5 MG tablet, Take 1 tablet by mouth Daily., Disp: 90 tablet, Rfl: 3  •  meclizine (Travel Sickness) 25 MG chewable tablet chewable tablet, Chew 1 tablet Every 8 (Eight) Hours., Disp: 30 tablet, Rfl: 12  •  triamcinolone (KENALOG) 0.1 % cream, Apply  topically to the appropriate area as directed 2 (Two) Times a Day. Arms, torso/ chest, Disp: 45 g, Rfl: 0    Objective      Blood pressure 142/60, pulse 64, temperature 98.2 °F (36.8 °C), temperature source Temporal, resp. rate 16, height 180.3 cm (71\"), weight 87 kg (191 lb 12.8 oz), SpO2 98 %.    Physical Exam     General Appearance:    Alert, cooperative, no distress, appears stated age   Head:    Normocephalic, without obvious abnormality, atraumatic   Eyes:    PERRL, conjunctiva/corneas clear, EOM's intact   Ears:    Normal TM's and external ear canals, both ears   Nose:   Nares normal, septum midline, mucosa normal, no drainage   or sinus tenderness   Throat:   Lips, mucosa, and tongue normal; teeth and gums normal   Neck:   Supple, symmetrical, trachea midline, no adenopathy;        thyroid:  No enlargement/tenderness/nodules; no carotid    bruit or JVD   Back:     Symmetric, no curvature, ROM normal, no CVA tenderness   Lungs:     Clear to auscultation bilaterally, respirations unlabored   Chest wall:    No tenderness or deformity   Heart:    Regular rate and rhythm, S1 and S2 normal, no murmur,        rub or gallop   Abdomen:     Soft, non-tender, bowel sounds active all four quadrants,     no masses, no organomegaly   Extremities:   Extremities normal, atraumatic, no cyanosis or edema   Pulses:   2+ and symmetric all extremities   Skin:   Skin color, texture, turgor normal, no rashes or lesions   Lymph nodes:   Cervical, supraclavicular, and axillary nodes normal   Neurologic:   CNII-XII intact. Normal strength, sensation and reflexes       throughout      Results for orders placed or performed during the hospital " encounter of 10/16/19   Adult Transthoracic Echo Complete W/ Cont if Necessary Per Protocol   Result Value Ref Range    IVSd 1.3 cm    IVSs 1.7 cm    LVIDd 5.3 cm    LVIDs 3.9 cm    LVPWd 1.2 cm    BH CV ECHO SHARDA - LVPWS 1.5 cm    IVS/LVPW 1.1     FS 26.4 %    EDV(Teich) 135.3 ml    ESV(Teich) 65.9 ml    EF(Teich) 51.3 %    EDV(cubed) 148.9 ml    ESV(cubed) 59.3 ml    EF(cubed) 60.2 %    % IVS thick 36.0 %    % LVPW thick 30.4 %    LV mass(C)d 256.6 grams    LV mass(C)s 249.0 grams    SV(Teich) 69.4 ml    SV(cubed) 89.6 ml    Ao root diam 2.5 cm    Ao root area 4.9 cm^2    LA dimension 5.0 cm    LA/Ao 2.0     LVOT diam 2.3 cm    LVOT area 4.2 cm^2    LVOT area(traced) 4.2 cm^2    LAd major 4.9 cm    LVLd ap4 8.5 cm    EDV(MOD-sp4) 111.0 ml    LVLs ap4 7.6 cm    ESV(MOD-sp4) 43.0 ml    EF(MOD-sp4) 61.3 %    LA volume 30.0 ml    SV(MOD-sp4) 68.0 ml    MV E max angelo 61.2 cm/sec    MV A max angelo 88.7 cm/sec    MV E/A 0.69     LV IVRT 0.14 sec    MV V2 max 97.9 cm/sec    MV max PG 3.8 mmHg    MV V2 mean 54.2 cm/sec    MV mean PG 1.0 mmHg    MV V2 VTI 23.4 cm    MVA(VTI) 3.7 cm^2    MV P1/2t max angelo 61.2 cm/sec    MV P1/2t 69.5 msec    MVA(P1/2t) 3.2 cm^2    MV dec slope 258.0 cm/sec^2    MV dec time 0.22 sec    Ao pk angelo 118.0 cm/sec    Ao max PG 6.0 mmHg    Ao max PG (full) 2.0 mmHg    Ao V2 mean 70.0 cm/sec    Ao mean PG 2.0 mmHg    Ao mean PG (full) 0 mmHg    Ao V2 VTI 18.9 cm    PEYMAN(I,A) 4.6 cm^2    PEYMAN(I,D) 4.6 cm^2    PEYMAN(V,A) 3.5 cm^2    PEYMAN(V,D) 3.5 cm^2    LV V1 max PG 4.0 mmHg    LV V1 mean PG 2.0 mmHg    LV V1 max 100.3 cm/sec    LV V1 mean 71.8 cm/sec    LV V1 VTI 20.9 cm    SV(Ao) 92.8 ml    SV(LVOT) 86.8 ml    TV V2 max 48.8 cm/sec    TV max PG 0.95 mmHg    PA V2 max 92.1 cm/sec    PA max PG 3.4 mmHg    TR max angelo 269.0 cm/sec    TR max PG 29.0 mmHg    RVSP(TR) 39.0 mmHg    RAP systole 10.0 mmHg    MVA P1/2T LCG 3.6 cm^2    Lat E/e'  7.7     Med E/e' 9.5     Lat Peak E' Angelo 7.9 cm/sec    Med Peak E' Angelo 6.4  cm/sec    Avg E/e' ratio 8.56     Target HR (85%) 115 bpm    Max. Pred. HR (100%) 135 bpm         Assessment/Plan   Htn. Pt already with dizziness and vertigo. Will hold on changes to bp meds.    Cri. Get fresh labs.      Sukumar was seen today for dizziness and urinary tract infection.    Diagnoses and all orders for this visit:    Dysuria  -     Basic Metabolic Panel  -     Uric Acid  -     Vitamin B12  -     TSH  -     T4, Free  -     CBC & Differential  -     PSA Screen    Essential hypertension  -     Basic Metabolic Panel  -     Uric Acid  -     Vitamin B12  -     TSH  -     T4, Free  -     CBC & Differential  -     PSA Screen    Prostate cancer screening  -     PSA Screen    CRI (chronic renal insufficiency), stage 2 (mild)  -     Basic Metabolic Panel  -     Uric Acid  -     Vitamin B12  -     TSH  -     T4, Free  -     CBC & Differential  -     PSA Screen    Other orders  -     amoxicillin-clavulanate (Augmentin) 875-125 MG per tablet; Take 1 tablet by mouth Every 12 (Twelve) Hours.  -     meclizine (Travel Sickness) 25 MG chewable tablet chewable tablet; Chew 1 tablet Every 8 (Eight) Hours.      Return in about 6 weeks (around 7/22/2020).          There are no Patient Instructions on file for this visit.     Yobany Nolasco MD    Assessment/Plan

## 2020-07-06 ENCOUNTER — OFFICE VISIT (OUTPATIENT)
Dept: CARDIOLOGY | Facility: CLINIC | Age: 85
End: 2020-07-06

## 2020-07-06 VITALS
HEART RATE: 71 BPM | HEIGHT: 71 IN | WEIGHT: 189 LBS | OXYGEN SATURATION: 95 % | DIASTOLIC BLOOD PRESSURE: 76 MMHG | SYSTOLIC BLOOD PRESSURE: 150 MMHG | BODY MASS INDEX: 26.46 KG/M2

## 2020-07-06 DIAGNOSIS — E78.2 MIXED HYPERLIPIDEMIA: ICD-10-CM

## 2020-07-06 DIAGNOSIS — I10 ESSENTIAL HYPERTENSION: ICD-10-CM

## 2020-07-06 DIAGNOSIS — I25.10 CORONARY ARTERY DISEASE INVOLVING NATIVE CORONARY ARTERY OF NATIVE HEART WITHOUT ANGINA PECTORIS: Primary | ICD-10-CM

## 2020-07-06 PROCEDURE — 99213 OFFICE O/P EST LOW 20 MIN: CPT | Performed by: INTERNAL MEDICINE

## 2020-07-06 NOTE — PROGRESS NOTES
[facility] at [facility]  Consultation H&P  Sukumar Cosby  1934  [address] [address]     VISIT DATE:  07/06/20    PCP: Yobany Nolasco MD  [address] [place] [id number]    IDENTIFICATION: A 86 y.o. male, adopted retired [employer] in [place]. Moved to [place] ~7 years ago and was then .     CC:  Chief Complaint   Patient presents with   • Hypertension       PROBLEM LIST:  1. Fatigue  1. LHC in [place]  2. 10/6/2018 echo: EF 55%, abnormal relaxation of the LV, trace MR, trace TR, normal pulmonary pressures per Dr. Wang  2. HTN  3. HLD  1. 3/21/2019   HDL 47   4. 11/16 AAA screen negative  5. CKD  6. BPH  7. Polymyositis  8. OA  9. Chronic pain  10. Back pain  11. GERD  12. Hypogonadism  13. Hx prostate cancer  14. History of gout  15. Surgical history:  1. Cholecystectomy 2005  2. EGD/colonoscopy 2013    Allergies  No Known Allergies    Current Medications    Current Outpatient Medications:   •  albuterol (VENTOLIN HFA) 108 (90 BASE) MCG/ACT inhaler, Ventolin  (90 Base) MCG/ACT Inhalation Aerosol Solution; Patient Sig: Ventolin  (90 Base) MCG/ACT Inhalation Aerosol Solution ; 18; 0; 24-May-2013; Active, Disp: , Rfl:   •  allopurinol (ZYLOPRIM) 100 MG tablet, Take 1 tablet by mouth Daily., Disp: 30 tablet, Rfl: 11  •  amLODIPine (NORVASC) 5 MG tablet, Take 1 tablet by mouth Daily., Disp: 90 tablet, Rfl: 3  •  B-12, Methylcobalamin, 1000 MCG sublingual tablet, Place 1 tablet under the tongue Daily., Disp: 90 tablet, Rfl: 3  •  carvedilol (COREG) 3.125 MG tablet, TAKE ONE TABLET BY MOUTH TWICE DAILY WITH MEALS, Disp: 60 tablet, Rfl: 11  •  irbesartan-hydrochlorothiazide (AVALIDE) 300-12.5 MG tablet, Take 1 tablet by mouth Daily., Disp: 90 tablet, Rfl: 3  •  meclizine (Travel Sickness) 25 MG chewable tablet chewable tablet, Chew 1 tablet Every 8 (Eight) Hours., Disp: 30 tablet, Rfl: 12  •  traMADol (ULTRAM) 50 MG tablet, tramadol 50 mg  "tablet, Disp: , Rfl:   •  triamcinolone (KENALOG) 0.1 % cream, Apply  topically to the appropriate area as directed 2 (Two) Times a Day. Arms, torso/ chest, Disp: 45 g, Rfl: 0  •  valACYclovir (Valtrex) 1000 MG tablet, Take 1 tablet by mouth 2 (Two) Times a Day for 7 days., Disp: 14 tablet, Rfl: 0     History of Present Illness   ESTRADA Cosby is a 86 y.o. year old male with the above mentioned PMH who presents for fu.  No new chest sxs.  Does little activities out of home due to pandemic.    SOCIAL HX  Social History     Socioeconomic History   • Marital status:      Spouse name: Not on file   • Number of children: Not on file   • Years of education: Not on file   • Highest education level: Not on file   Tobacco Use   • Smoking status: Never Smoker   • Smokeless tobacco: Never Used   Substance and Sexual Activity   • Alcohol use: No   • Drug use: No   • Sexual activity: Defer       FAMILY HX  Family History   Problem Relation Age of Onset   • Cancer Mother    • Migraines Mother    • Osteoporosis Mother        Vitals:    07/06/20 1509   BP: 150/76   BP Location: Right arm   Patient Position: Sitting   Pulse: 71   SpO2: 95%   Weight: 85.7 kg (189 lb)   Height: 180.3 cm (71\")       PHYSICAL EXAMINATION:  Physical Exam   Constitutional: He is oriented to person, place, and time. He appears well-developed and well-nourished. No distress.   HENT:   Head: Normocephalic and atraumatic.   Right Ear: External ear normal.   Left Ear: External ear normal.   Nose: Nose normal.   Eyes: Conjunctivae and EOM are normal.   Neck: Neck supple. No hepatojugular reflux and no JVD present. Carotid bruit is not present. No thyromegaly present.   Cardiovascular: Normal rate, regular rhythm, S1 normal, S2 normal, normal heart sounds, intact distal pulses and normal pulses. Exam reveals no gallop, no distant heart sounds and no midsystolic click.   No murmur heard.  Pulses:       Radial pulses are 2+ on the right side, and 2+ on the " left side.        Dorsalis pedis pulses are 2+ on the right side, and 2+ on the left side.        Posterior tibial pulses are 2+ on the right side, and 2+ on the left side.   Pulmonary/Chest: Effort normal and breath sounds normal. No respiratory distress. He has no decreased breath sounds. He has no wheezes. He has no rhonchi. He has no rales.   Abdominal: Soft. Bowel sounds are normal. There is no hepatosplenomegaly. There is no tenderness.   Musculoskeletal: Normal range of motion. He exhibits no edema.   Neurological: He is alert and oriented to person, place, and time.   No focal deficits.   Skin: Skin is warm and dry. No erythema.   Psychiatric: He has a normal mood and affect. Thought content normal.   Nursing note and vitals reviewed.      Diagnostic Data:  Procedures  Lab Results   Component Value Date    CHLPL 190 03/21/2019    TRIG 158 (H) 03/21/2019    HDL 47 03/21/2019     Lab Results   Component Value Date    GLUCOSE 100 (H) 09/19/2019    BUN 25 (H) 09/19/2019    CREATININE 1.43 (H) 09/19/2019     09/19/2019    K 4.7 09/19/2019     09/19/2019    CO2 29.0 09/19/2019     No results found for: HGBA1C  Lab Results   Component Value Date    WBC 7.91 09/19/2019    HGB 13.2 09/19/2019    HCT 40.2 09/19/2019     09/19/2019       ASSESSMENT:   Diagnosis Plan   1. Coronary artery disease involving native coronary artery of native heart without angina pectoris     2. Essential hypertension     3. Mixed hyperlipidemia       PLAN:  1. Fatigue multifactorial.  Was noted to have some anemia in the past and has labs pending w PCP now.  2. BP well controlled, continue antihypertensives. Pt to check BP and HR regularly at home, if HR is falling in the 50s, would then wean off coreg. Otherwise continue current regimen.   3. Borderline lipids not on statin at present.

## 2020-08-08 ENCOUNTER — HOSPITAL ENCOUNTER (EMERGENCY)
Facility: HOSPITAL | Age: 85
Discharge: HOME | End: 2020-08-08
Attending: EMERGENCY MEDICINE | Admitting: EMERGENCY MEDICINE

## 2020-08-08 ENCOUNTER — APPOINTMENT (OUTPATIENT)
Dept: GENERAL RADIOLOGY | Facility: HOSPITAL | Age: 85
End: 2020-08-08

## 2020-08-08 VITALS
HEIGHT: 71 IN | TEMPERATURE: 97.6 F | SYSTOLIC BLOOD PRESSURE: 125 MMHG | DIASTOLIC BLOOD PRESSURE: 71 MMHG | WEIGHT: 182 LBS | OXYGEN SATURATION: 93 % | RESPIRATION RATE: 16 BRPM | HEART RATE: 62 BPM | BODY MASS INDEX: 25.48 KG/M2

## 2020-08-08 DIAGNOSIS — Z20.822 SUSPECTED COVID-19 VIRUS INFECTION: ICD-10-CM

## 2020-08-08 DIAGNOSIS — R53.83 OTHER FATIGUE: Primary | ICD-10-CM

## 2020-08-08 LAB
ALBUMIN SERPL-MCNC: 3.9 G/DL (ref 3.5–5.2)
ALBUMIN/GLOB SERPL: 1.3 G/DL
ALP SERPL-CCNC: 85 U/L (ref 39–117)
ALT SERPL W P-5'-P-CCNC: 22 U/L (ref 1–41)
ANION GAP SERPL CALCULATED.3IONS-SCNC: 10.9 MMOL/L (ref 5–15)
AST SERPL-CCNC: 30 U/L (ref 1–40)
BACTERIA UR QL AUTO: ABNORMAL /HPF
BASOPHILS # BLD AUTO: 0.03 10*3/MM3 (ref 0–0.2)
BASOPHILS NFR BLD AUTO: 0.6 % (ref 0–1.5)
BILIRUB SERPL-MCNC: 0.7 MG/DL (ref 0–1.2)
BILIRUB UR QL STRIP: NEGATIVE
BUN SERPL-MCNC: 26 MG/DL (ref 8–23)
BUN/CREAT SERPL: 16.6 (ref 7–25)
CALCIUM SPEC-SCNC: 8.9 MG/DL (ref 8.6–10.5)
CHLORIDE SERPL-SCNC: 100 MMOL/L (ref 98–107)
CLARITY UR: CLEAR
CO2 SERPL-SCNC: 21.1 MMOL/L (ref 22–29)
COLOR UR: YELLOW
CREAT SERPL-MCNC: 1.57 MG/DL (ref 0.76–1.27)
DEPRECATED RDW RBC AUTO: 49.3 FL (ref 37–54)
EOSINOPHIL # BLD AUTO: 0.03 10*3/MM3 (ref 0–0.4)
EOSINOPHIL NFR BLD AUTO: 0.6 % (ref 0.3–6.2)
ERYTHROCYTE [DISTWIDTH] IN BLOOD BY AUTOMATED COUNT: 14.6 % (ref 12.3–15.4)
GFR SERPL CREATININE-BSD FRML MDRD: 42 ML/MIN/1.73
GLOBULIN UR ELPH-MCNC: 3.1 GM/DL
GLUCOSE SERPL-MCNC: 101 MG/DL (ref 65–99)
GLUCOSE UR STRIP-MCNC: NEGATIVE MG/DL
HCT VFR BLD AUTO: 38.4 % (ref 37.5–51)
HGB BLD-MCNC: 13.1 G/DL (ref 13–17.7)
HGB UR QL STRIP.AUTO: NEGATIVE
HYALINE CASTS UR QL AUTO: ABNORMAL /LPF
IMM GRANULOCYTES # BLD AUTO: 0.01 10*3/MM3 (ref 0–0.05)
IMM GRANULOCYTES NFR BLD AUTO: 0.2 % (ref 0–0.5)
KETONES UR QL STRIP: NEGATIVE
LEUKOCYTE ESTERASE UR QL STRIP.AUTO: NEGATIVE
LYMPHOCYTES # BLD AUTO: 1.69 10*3/MM3 (ref 0.7–3.1)
LYMPHOCYTES NFR BLD AUTO: 32.7 % (ref 19.6–45.3)
MCH RBC QN AUTO: 31.2 PG (ref 26.6–33)
MCHC RBC AUTO-ENTMCNC: 34.1 G/DL (ref 31.5–35.7)
MCV RBC AUTO: 91.4 FL (ref 79–97)
MONOCYTES # BLD AUTO: 0.78 10*3/MM3 (ref 0.1–0.9)
MONOCYTES NFR BLD AUTO: 15.1 % (ref 5–12)
MUCOUS THREADS URNS QL MICRO: ABNORMAL /HPF
NEUTROPHILS NFR BLD AUTO: 2.63 10*3/MM3 (ref 1.7–7)
NEUTROPHILS NFR BLD AUTO: 50.8 % (ref 42.7–76)
NITRITE UR QL STRIP: NEGATIVE
NRBC BLD AUTO-RTO: 0 /100 WBC (ref 0–0.2)
PH UR STRIP.AUTO: <=5 [PH] (ref 5–8)
PLATELET # BLD AUTO: 163 10*3/MM3 (ref 140–450)
PMV BLD AUTO: 10.1 FL (ref 6–12)
POTASSIUM SERPL-SCNC: 4.2 MMOL/L (ref 3.5–5.2)
PROT SERPL-MCNC: 7 G/DL (ref 6–8.5)
PROT UR QL STRIP: ABNORMAL
RBC # BLD AUTO: 4.2 10*6/MM3 (ref 4.14–5.8)
RBC # UR: ABNORMAL /HPF
REF LAB TEST METHOD: ABNORMAL
SODIUM SERPL-SCNC: 132 MMOL/L (ref 136–145)
SP GR UR STRIP: 1.02 (ref 1–1.03)
SQUAMOUS #/AREA URNS HPF: ABNORMAL /HPF
UROBILINOGEN UR QL STRIP: ABNORMAL
WBC # BLD AUTO: 5.17 10*3/MM3 (ref 3.4–10.8)
WBC UR QL AUTO: ABNORMAL /HPF

## 2020-08-08 PROCEDURE — 80053 COMPREHEN METABOLIC PANEL: CPT | Performed by: EMERGENCY MEDICINE

## 2020-08-08 PROCEDURE — 85025 COMPLETE CBC W/AUTO DIFF WBC: CPT | Performed by: EMERGENCY MEDICINE

## 2020-08-08 PROCEDURE — 71045 X-RAY EXAM CHEST 1 VIEW: CPT

## 2020-08-08 PROCEDURE — 93005 ELECTROCARDIOGRAM TRACING: CPT | Performed by: EMERGENCY MEDICINE

## 2020-08-08 PROCEDURE — 81001 URINALYSIS AUTO W/SCOPE: CPT | Performed by: EMERGENCY MEDICINE

## 2020-08-08 PROCEDURE — U0004 COV-19 TEST NON-CDC HGH THRU: HCPCS | Performed by: EMERGENCY MEDICINE

## 2020-08-08 PROCEDURE — U0002 COVID-19 LAB TEST NON-CDC: HCPCS | Performed by: EMERGENCY MEDICINE

## 2020-08-08 PROCEDURE — 99284 EMERGENCY DEPT VISIT MOD MDM: CPT

## 2020-08-08 RX ORDER — SODIUM CHLORIDE 0.9 % (FLUSH) 0.9 %
10 SYRINGE (ML) INJECTION AS NEEDED
Status: DISCONTINUED | OUTPATIENT
Start: 2020-08-08 | End: 2020-08-08 | Stop reason: HOSPADM

## 2020-08-10 ENCOUNTER — TELEPHONE (OUTPATIENT)
Dept: EMERGENCY DEPT | Facility: HOSPITAL | Age: 85
End: 2020-08-10

## 2020-08-10 ENCOUNTER — PATIENT OUTREACH (OUTPATIENT)
Dept: CASE MANAGEMENT | Facility: OTHER | Age: 85
End: 2020-08-10

## 2020-08-10 ENCOUNTER — EPISODE CHANGES (OUTPATIENT)
Dept: CASE MANAGEMENT | Facility: OTHER | Age: 85
End: 2020-08-10

## 2020-08-10 LAB
REF LAB TEST METHOD: ABNORMAL
SARS-COV-2 RNA RESP QL NAA+PROBE: DETECTED

## 2020-08-10 NOTE — OUTREACH NOTE
"ED Potential Covid Discharge Follow-up    Called patient in follow up to ED visit 8-8-20 with fatigue and weakness.  Patient was tested for Covid-19 virus; awaits results.  Patient reports: \"doing alright\"; just feels weak; has a low appetite, but makes himself eat everyday anyway; denied fever, denied sore throat; denied cough. Stated he is very independent, with ADL's, Mobility (does not use assistive device), Driving and Medications; voiced compliance with daily medications as ordered.  Patient lives alone.  Has assistance from helpful neighbors who check on him regularly, patient said.  Reports no barriers to getting prescriptions filled and taking medications as ordered.  Reports no food or transportation insecurities.    Reviewed with patient: Quarantine precautions (voiced understanding and compliance); ED discharge recommendations, AVS from ED; 24/7 Nurse Triage Line, [phone]; Covid-19 Hotline#, [phone].  Discussed importance of close PCP f/u, telehealth and video appts.  Patient gave permission for RN-ACM to call PCP office for a f/u appt.    Nahomy Avendaño RN  Ambulatory     8/10/2020, 14:42      "

## 2020-08-10 NOTE — OUTREACH NOTE
Care Coordination Note    Called patient's PCP office, Dr. KERA Nolasco, and spoke with Therese.  Informed them of patient's ED visit 8-8-20; of Covid Test, and patient results are still pending at this time; of need for a f/u appt.  Received appt for patient with ELADIA Moon, for Wednesday, 8-12-20, at 11:15am.  Therese said the office will call patient tomorrow to screen him for Covid, and they will confirm the Covid Test results.    Nahomy Avendaño RN  Ambulatory     8/10/2020, 14:48

## 2020-08-10 NOTE — OUTREACH NOTE
Patient Outreach Note    Called patient back and informed him of his appointment at Dr. Nolasco's office, with ELADIA Moon, on Wednesday, 8-12-20, at 11:15am.  Patient said he wrote it down, and he will be there.  Told patient that Dr. Nolasco's office will be calling him tomorrow to ask some questions about Covid screening, and by that time they will know Covid results.  Patient voiced understanding.     Nahomy Avendaño RN  Ambulatory     8/10/2020, 14:51

## 2020-08-10 NOTE — ED NOTES
Pt called and advised of [facility], advised to quarantine, and they will be contacted by health department tomorrow.      Gabby Monroe, RN  08/10/20 2344

## 2020-08-12 ENCOUNTER — TELEPHONE (OUTPATIENT)
Dept: URGENT CARE | Facility: CLINIC | Age: 85
End: 2020-08-12

## 2020-08-12 ENCOUNTER — TELEPHONE (OUTPATIENT)
Dept: INTERNAL MEDICINE | Facility: CLINIC | Age: 85
End: 2020-08-12

## 2020-08-12 NOTE — TELEPHONE ENCOUNTER
Patient was seen here initially and then sent to the ER, the ER swab him for COVID-19 virus.  The health department since notified him of the test results.  He called here to confirm the results.  I confirmed with him that his symptoms have improved.  And that he should follow-up with his PCP or the ER if symptoms returned or did not continue to improve.  He should continue with his quarantine note as given to him at his visit.

## 2020-08-19 NOTE — ED PROVIDER NOTES
Subjective   History of Present Illness    Chief Complaint: General weakness  History of Present Illness: 86-year-old male presents with complaint x5days.  States just does not feel well generalized body aches and malaise  Onset: 4 to 5 days  Duration: Cyst  Exacerbating / Alleviating factors: None  Associated symptoms: None      Nurses Notes reviewed and agree, including vitals, allergies, social history and prior medical history.     REVIEW OF SYSTEMS: All systems reviewed and not pertinent unless noted.    Positive for: General weakness and malaise    Negative for: Fever flank pain hematuria cough shortness of breath hemoptysis syncope  Review of Systems    Past Medical History:   Diagnosis Date   • Arthritis    • Colon polyps    • Depression    • Fluid in knee    • Frequent UTI    • Gallstones    • GERD (gastroesophageal reflux disease)    • History of anemia    • History of blood transfusion    • History of bronchitis    • History of colonic polyps    • History of fibromyalgia    • History of gallstones    • Hypertension    • Prostate cancer (CMS/HCC)    • Sinusitis        No Known Allergies    Past Surgical History:   Procedure Laterality Date   • CHOLECYSTECTOMY  2005   • COLONOSCOPY  2013   • UPPER GASTROINTESTINAL ENDOSCOPY  2013       Family History   Problem Relation Age of Onset   • Cancer Mother    • Migraines Mother    • Osteoporosis Mother        Social History     Socioeconomic History   • Marital status:      Spouse name: Not on file   • Number of children: Not on file   • Years of education: Not on file   • Highest education level: Not on file   Tobacco Use   • Smoking status: Never Smoker   • Smokeless tobacco: Never Used   Substance and Sexual Activity   • Alcohol use: No   • Drug use: No   • Sexual activity: Defer           Objective   Physical Exam    GENERAL APPEARANCE: Well developed, elderly 86-year-old white male,  in no acute distress.  VITAL SIGNS: per nursing, reviewed and  noted  SKIN: exposed skin with no rashes, ulcerations or petechiae.  Head: Normocephalic, atraumatic.   EYES: perrla. EOMI.  ENT: Normal voice.  Patient maintained wearing a mask throughout patient encounter due to coronavirus pandemic  LUNGS:  No increased work of breathing. No retractions.   CARDIOVASCULAR:  regular rate and rhythm, no murmurs.  Good Peripheral pulses. Good cap refill to extremities.   ABDOMEN: Soft, nontender, normal bowel sounds. No hernia. No ascites.  MUSCULOSKELETAL:  No tenderness. Full ROM. Strength and tone normal.  NEUROLOGIC: Alert, oriented x 3. No gross deficits. GCS 15.   NECK: Supple, symmetric. No tenderness, no masses. Full ROM  Back: full rom, no paraspinal spasm. No CVA tenderness.   PSYCH: appropriate affect.  : no bladder tenderness or distention, no CVA tenderness      Procedures     No attending physician procedures were performed on this patient.      ED Course  ED Course as of Aug 19 1448   Sat Aug 08, 2020   1413 WBC: 5.17 [PF]   1413 Hemoglobin: 13.1 [PF]   1413 Hematocrit: 38.4 [PF]   1413 Platelets: 163 [PF]   1413 Glucose(!): 101 [PF]   1413 BUN(!): 26 [PF]   1413 Creatinine(!): 1.57 [PF]   1413 Sodium(!): 132 [PF]   1413 Potassium: 4.2 [PF]   1413 Chloride: 100 [PF]   1413 ALT (SGPT): 22 [PF]   1413 AST (SGOT): 30 [PF]   1413 Alkaline Phosphatase: 85 [PF]   1413 Total Bilirubin: 0.7 [PF]   1512 FINDINGS: The heart is normal in size. The mediastinum is unremarkable.  The lungs are clear. There is no pneumothorax.  There are no acute  osseous abnormalities.     IMPRESSION:  No acute cardiopulmonary process.     Continued followup is recommended.     This report was finalized on 8/8/2020 1:15 PM by Baljeet Patino,    [PF]   1522 EKG interpreted by me reveals sinus rhythm rate of 71.  No ischemic changes.  Occasional PVC.    [PF]      ED Course User Index  [PF] Fausto Buchanan, DO                                           MDM  86-year-old male with above complaint  with malaise and fatigue and body aches.  Concern for covid, however other testing is reassuring including chest x-ray EKG CBC, mild renal insufficiency otherwise nontoxic.  Reasonable for home care.  Covid testing pending.  Final diagnoses:   Other fatigue   Suspected COVID-19 virus infection            Fausto Buchanan, DO  08/19/20 1454

## 2020-08-20 ENCOUNTER — EPISODE CHANGES (OUTPATIENT)
Dept: CASE MANAGEMENT | Facility: OTHER | Age: 85
End: 2020-08-20

## 2020-09-23 DIAGNOSIS — R97.20 ELEVATED PSA: ICD-10-CM

## 2020-09-23 DIAGNOSIS — M10.079 IDIOPATHIC GOUT OF FOOT, UNSPECIFIED CHRONICITY, UNSPECIFIED LATERALITY: ICD-10-CM

## 2020-09-23 DIAGNOSIS — R53.1 WEAKNESS GENERALIZED: ICD-10-CM

## 2020-09-23 DIAGNOSIS — R53.83 FATIGUE, UNSPECIFIED TYPE: ICD-10-CM

## 2020-09-23 DIAGNOSIS — R42 VERTIGO: ICD-10-CM

## 2020-09-23 DIAGNOSIS — A79.9 RICKETTSIOSES: ICD-10-CM

## 2020-09-23 RX ORDER — ALLOPURINOL 100 MG/1
TABLET ORAL
Qty: 30 TABLET | Refills: 11 | Status: SHIPPED | OUTPATIENT
Start: 2020-09-23 | End: 2021-09-28

## 2020-09-30 DIAGNOSIS — I10 ESSENTIAL HYPERTENSION: ICD-10-CM

## 2020-09-30 RX ORDER — IRBESARTAN AND HYDROCHLOROTHIAZIDE 300; 12.5 MG/1; MG/1
TABLET, FILM COATED ORAL
Qty: 90 TABLET | Refills: 3 | Status: SHIPPED | OUTPATIENT
Start: 2020-09-30 | End: 2021-11-30

## 2020-10-21 DIAGNOSIS — I10 ESSENTIAL HYPERTENSION: ICD-10-CM

## 2020-10-21 RX ORDER — CARVEDILOL 3.12 MG/1
TABLET ORAL
Qty: 60 TABLET | Refills: 11 | Status: SHIPPED | OUTPATIENT
Start: 2020-10-21 | End: 2021-12-21

## 2021-05-25 ENCOUNTER — TELEPHONE (OUTPATIENT)
Dept: INTERNAL MEDICINE | Facility: CLINIC | Age: 86
End: 2021-05-25

## 2021-06-18 RX ORDER — MECLIZINE HYDROCHLORIDE 25 MG/1
TABLET ORAL
Qty: 30 TABLET | Refills: 12 | Status: SHIPPED | OUTPATIENT
Start: 2021-06-18 | End: 2022-08-22

## 2021-07-23 ENCOUNTER — OFFICE VISIT (OUTPATIENT)
Dept: INTERNAL MEDICINE | Facility: CLINIC | Age: 86
End: 2021-07-23

## 2021-07-23 VITALS
OXYGEN SATURATION: 97 % | SYSTOLIC BLOOD PRESSURE: 136 MMHG | DIASTOLIC BLOOD PRESSURE: 72 MMHG | RESPIRATION RATE: 16 BRPM | HEART RATE: 64 BPM | WEIGHT: 179 LBS | BODY MASS INDEX: 25.06 KG/M2 | TEMPERATURE: 98.2 F | HEIGHT: 71 IN

## 2021-07-23 DIAGNOSIS — R79.9 ABNORMAL FINDING OF BLOOD CHEMISTRY, UNSPECIFIED: ICD-10-CM

## 2021-07-23 DIAGNOSIS — M54.50 ACUTE RIGHT-SIDED LOW BACK PAIN WITHOUT SCIATICA: ICD-10-CM

## 2021-07-23 DIAGNOSIS — R53.83 FATIGUE, UNSPECIFIED TYPE: ICD-10-CM

## 2021-07-23 DIAGNOSIS — R93.3 ABNORMAL FINDINGS ON DIAGNOSTIC IMAGING OF OTHER PARTS OF DIGESTIVE TRACT: ICD-10-CM

## 2021-07-23 DIAGNOSIS — Z00.00 ROUTINE GENERAL MEDICAL EXAMINATION AT A HEALTH CARE FACILITY: Primary | ICD-10-CM

## 2021-07-23 DIAGNOSIS — Z12.5 PROSTATE CANCER SCREENING: ICD-10-CM

## 2021-07-23 PROCEDURE — G0439 PPPS, SUBSEQ VISIT: HCPCS | Performed by: INTERNAL MEDICINE

## 2021-07-23 PROCEDURE — 99397 PER PM REEVAL EST PAT 65+ YR: CPT | Performed by: INTERNAL MEDICINE

## 2021-07-23 PROCEDURE — 1170F FXNL STATUS ASSESSED: CPT | Performed by: INTERNAL MEDICINE

## 2021-07-23 PROCEDURE — 1159F MED LIST DOCD IN RCRD: CPT | Performed by: INTERNAL MEDICINE

## 2021-07-23 PROCEDURE — 99213 OFFICE O/P EST LOW 20 MIN: CPT | Performed by: INTERNAL MEDICINE

## 2021-07-23 PROCEDURE — 96160 PT-FOCUSED HLTH RISK ASSMT: CPT | Performed by: INTERNAL MEDICINE

## 2021-07-23 PROCEDURE — 1125F AMNT PAIN NOTED PAIN PRSNT: CPT | Performed by: INTERNAL MEDICINE

## 2021-07-23 NOTE — PROGRESS NOTES
QUICK REFERENCE INFORMATION:  The ABCs of the Annual Wellness Visit    Medicare Annual Wellness Visit    Subjective   History of Present Illness    Sukumar Cosby is a 87 y.o. male who presents for an Annual Wellness Visit. In addition, we addressed the following health issues:*lower back pain          PMH, PSH, SocHx, FamHx, Allergies, and Medications: Reviewed and updated.     Outpatient Medications Prior to Visit   Medication Sig Dispense Refill   • allopurinol (ZYLOPRIM) 100 MG tablet TAKE ONE TABLET BY MOUTH EVERY DAY 30 tablet 11   • amLODIPine (NORVASC) 5 MG tablet Take 1 tablet by mouth Daily. 90 tablet 3   • carvedilol (COREG) 3.125 MG tablet TAKE ONE TABLET BY MOUTH TWICE DAILY WITH MEALS 60 tablet 11   • ciprofloxacin (CIPRO) 500 MG tablet 1 po bid 14 tablet 0   • irbesartan-hydrochlorothiazide (AVALIDE) 300-12.5 MG tablet TAKE ONE TABLET BY MOUTH EVERY DAY 90 tablet 3   • meclizine (ANTIVERT) 25 MG tablet TAKE ONE TABLET BY MOUTH EVERY 8 HOURS 30 tablet 12   • traMADol (ULTRAM) 50 MG tablet tramadol 50 mg tablet       No facility-administered medications prior to visit.       Patient Active Problem List   Diagnosis   • Arthritis   • Ataxic gait   • BMI 30.0-30.9,adult   • BPPV (benign paroxysmal positional vertigo)   • Chronic pain   • Chronic kidney disease   • Depression   • Enlarged prostate without lower urinary tract symptoms (luts)   • GERD without esophagitis   • Gout   • Hypertension   • Hypogonadism in male   • Lumbar radiculopathy   • Peripheral neuropathy   • Restless legs syndrome   • Spinal stenosis   • Ventral hernia   • Polymyositis (CMS/HCC)   • Prostate CA (CMS/HCC)       Health Habits:  Dental Exam. up to date  Eye Exam. up to date  No exam data present  Exercise: 7 times/week.  Current exercise activities include: walking and weightlifting    Health Risk Assessment:  The patient has completed a Health Risk Assessment. This has been reviewed with them and has been scanned into the patient's  chart.    Current Medical Providers:  Patient Care Team:  Yobany Nolasco MD as PCP - General (Internal Medicine)  Doris Cox MD as Surgeon (General Surgery)    The [facility] providers who are involved in the care of this patient are listed above. Additional providers and suppliers are listed below:      Recent Hospitalizations:  No hospitalization(s) within the last year..    Recent Lab Results:  CMP:  Lab Results   Component Value Date    GLU 99 (H) 03/21/2019    BUN 26 (H) 08/08/2020    CREATININE 1.57 (H) 08/08/2020    EGFRIFNONA 42 (L) 08/08/2020    EGFRIFAFRI 77 03/21/2019    BCR 16.6 08/08/2020     (L) 08/08/2020    K 4.2 08/08/2020    CO2 21.1 (L) 08/08/2020    CALCIUM 8.9 08/08/2020    PROTENTOTREF 7.2 03/21/2019    ALBUMIN 3.90 08/08/2020    LABGLOBREF 2.8 03/21/2019    LABIL2 1.6 03/21/2019    BILITOT 0.7 08/08/2020    ALKPHOS 85 08/08/2020    AST 30 08/08/2020    ALT 22 08/08/2020       LIPID PANEL:  Lab Results   Component Value Date    CHLPL 190 03/21/2019    TRIG 158 (H) 03/21/2019    HDL 47 03/21/2019    VLDL 31.6 03/21/2019     (H) 03/21/2019       HbA1c:  No results found for: HGBA1C    URINE MICROALBUMIN:  No results found for: MICROALBUR, POCMALB    PSA:  Lab Results   Component Value Date    PSA 0.363 06/07/2018       Age-appropriate Screening Schedule:  Refer to the list below for future screening recommendations based on patient's age, sex and/or medical conditions. Orders for these recommended tests are listed in the plan section. The patient has been provided with a written plan.    Health Maintenance   Topic Date Due   • TDAP/TD VACCINES (1 - Tdap) Never done   • ZOSTER VACCINE (1 of 2) Never done   • LIPID PANEL  07/06/2020   • INFLUENZA VACCINE  10/01/2021       Depression Screen:   PHQ-2/PHQ-9 Depression Screening 7/23/2021   Little interest or pleasure in doing things 0   Feeling down, depressed, or hopeless 0   Trouble falling or staying asleep, or sleeping  too much -   Feeling tired or having little energy -   Poor appetite or overeating -   Feeling bad about yourself - or that you are a failure or have let yourself or your family down -   Trouble concentrating on things, such as reading the newspaper or watching television -   Moving or speaking so slowly that other people could have noticed. Or the opposite - being so fidgety or restless that you have been moving around a lot more than usual -   Thoughts that you would be better off dead, or of hurting yourself in some way -   Total Score 0         Functional and Cognitive Screening:  Functional & Cognitive Status 6/10/2020   Do you have difficulty preparing food and eating? No   Do you have difficulty bathing yourself, getting dressed or grooming yourself? No   Do you have difficulty using the toilet? No   Do you have difficulty moving around from place to place? No   Do you have trouble with steps or getting out of a bed or a chair? No   Current Diet Well Balanced Diet   Dental Exam Not up to date   Eye Exam Not up to date   Exercise (times per week) 2 times per week   Current Exercise Activities Include Walking   Do you need help using the phone?  No   Are you deaf or do you have serious difficulty hearing?  No   Do you need help with transportation? No   Do you need help shopping? No   Do you need help preparing meals?  No   Do you need help with housework?  No   Do you need help with laundry? No   Do you need help taking your medications? No   Do you need help managing money? No   Do you ever drive or ride in a car without wearing a seat belt? No   Have you felt unusual stress, anger or loneliness in the last month? No   Who do you live with? Alone   If you need help, do you have trouble finding someone available to you? No   Do you have difficulty concentrating, remembering or making decisions? No       Does the patient have evidence of cognitive impairment? No    Advanced Care Planning:  ACP discussion was  "held with the patient during this visit. Patient does not have an advance directive, information provided.    Identification of Risk Factors:  Risk factors include: Advance Directive Discussion  Fall Risk.    Review of Systems    Compared to one year ago, the patient feels his physical health is better.  Compared to one year ago, the patient feels his mental health is better.    Objective     Physical Exam    Vitals:    07/23/21 1321   BP: 136/72   BP Location: Left arm   Patient Position: Sitting   Cuff Size: Adult   Pulse: 64   Resp: 16   Temp: 98.2 °F (36.8 °C)   TempSrc: Temporal   SpO2: 97%   Weight: 81.2 kg (179 lb)   Height: 180.3 cm (71\")   PainSc:   9   PainLoc: Back       Body mass index is 24.97 kg/m².  Discussed the patient's BMI with him. The BMI is in the acceptable range.    Assessment/Plan   Patient Self-Management and Personalized Health Advice  The patient has been provided with information about: diet and exercise and preventive services including:   · Annual Wellness Visit (AWV).    Visit Diagnoses:    ICD-10-CM ICD-9-CM   1. Routine general medical examination at a health care facility  Z00.00 V70.0       No orders of the defined types were placed in this encounter.      Outpatient Encounter Medications as of 7/23/2021   Medication Sig Dispense Refill   • allopurinol (ZYLOPRIM) 100 MG tablet TAKE ONE TABLET BY MOUTH EVERY DAY 30 tablet 11   • amLODIPine (NORVASC) 5 MG tablet Take 1 tablet by mouth Daily. 90 tablet 3   • carvedilol (COREG) 3.125 MG tablet TAKE ONE TABLET BY MOUTH TWICE DAILY WITH MEALS 60 tablet 11   • ciprofloxacin (CIPRO) 500 MG tablet 1 po bid 14 tablet 0   • irbesartan-hydrochlorothiazide (AVALIDE) 300-12.5 MG tablet TAKE ONE TABLET BY MOUTH EVERY DAY 90 tablet 3   • meclizine (ANTIVERT) 25 MG tablet TAKE ONE TABLET BY MOUTH EVERY 8 HOURS 30 tablet 12   • traMADol (ULTRAM) 50 MG tablet tramadol 50 mg tablet       No facility-administered encounter medications on file as of " 7/23/2021.       Reviewed use of high risk medication in the elderly: yes  Reviewed for potential of harmful drug interactions in the elderly: yes    Follow Up:  No follow-ups on file.     An After Visit Summary and PPPS with all of these plans were given to the patient.             Diagnoses and all orders for this visit:    1. Routine general medical examination at a health care facility (Primary)

## 2021-07-23 NOTE — PROGRESS NOTES
"Subjective     Patient ID: Sukumar Cosby is a 87 y.o. male. Patient is here for management of multiple medical problems.     Chief Complaint   Patient presents with   • Annual Exam     History of Present Illness     Pt with lower back pain.          The following portions of the patient's history were reviewed and updated as appropriate: allergies, current medications, past family history, past medical history, past social history, past surgical history and problem list.    Review of Systems   HENT: Negative for congestion, dental problem and sore throat.    Gastrointestinal: Negative for abdominal distention, abdominal pain and anal bleeding.   Musculoskeletal: Negative for myalgias and neck pain.   Psychiatric/Behavioral: Negative for self-injury and sleep disturbance. The patient is not nervous/anxious.    All other systems reviewed and are negative.      Current Outpatient Medications:   •  allopurinol (ZYLOPRIM) 100 MG tablet, TAKE ONE TABLET BY MOUTH EVERY DAY, Disp: 30 tablet, Rfl: 11  •  amLODIPine (NORVASC) 5 MG tablet, Take 1 tablet by mouth Daily., Disp: 90 tablet, Rfl: 3  •  carvedilol (COREG) 3.125 MG tablet, TAKE ONE TABLET BY MOUTH TWICE DAILY WITH MEALS, Disp: 60 tablet, Rfl: 11  •  ciprofloxacin (CIPRO) 500 MG tablet, 1 po bid, Disp: 14 tablet, Rfl: 0  •  irbesartan-hydrochlorothiazide (AVALIDE) 300-12.5 MG tablet, TAKE ONE TABLET BY MOUTH EVERY DAY, Disp: 90 tablet, Rfl: 3  •  meclizine (ANTIVERT) 25 MG tablet, TAKE ONE TABLET BY MOUTH EVERY 8 HOURS, Disp: 30 tablet, Rfl: 12  •  traMADol (ULTRAM) 50 MG tablet, tramadol 50 mg tablet, Disp: , Rfl:   •  Diclofenac Sodium (VOLTAREN) 1 % gel gel, Apply 4 g topically to the appropriate area as directed 4 (Four) Times a Day As Needed (pain)., Disp: 100 g, Rfl: 1    Objective      Blood pressure 136/72, pulse 64, temperature 98.2 °F (36.8 °C), temperature source Temporal, resp. rate 16, height 180.3 cm (71\"), weight 81.2 kg (179 lb), SpO2 97 %.    Physical " Exam     General Appearance:    Alert, cooperative, no distress, appears stated age   Head:    Normocephalic, without obvious abnormality, atraumatic   Eyes:    PERRL, conjunctiva/corneas clear, EOM's intact   Ears:    Normal TM's and external ear canals, both ears   Nose:   Nares normal, septum midline, mucosa normal, no drainage   or sinus tenderness   Throat:   Lips, mucosa, and tongue normal; teeth and gums normal   Neck:   Supple, symmetrical, trachea midline, no adenopathy;        thyroid:  No enlargement/tenderness/nodules; no carotid    bruit or JVD   Back:     Symmetric, no curvature, ROM normal, no CVA tenderness   Lungs:     Clear to auscultation bilaterally, respirations unlabored   Chest wall:    No tenderness or deformity   Heart:    Regular rate and rhythm, S1 and S2 normal, no murmur,        rub or gallop   Abdomen:     Soft, non-tender, bowel sounds active all four quadrants,     no masses, no organomegaly   Extremities:   Extremities normal, atraumatic, no cyanosis or edema   Pulses:   2+ and symmetric all extremities   Skin:   Skin color, texture, turgor normal, no rashes or lesions   Lymph nodes:   Cervical, supraclavicular, and axillary nodes normal   Neurologic:   CNII-XII intact. Normal strength, sensation and reflexes       throughout      Results for orders placed or performed during the hospital encounter of 12/29/20   Urine Culture - Urine, Urine, Clean Catch    Specimen: Urine, Clean Catch   Result Value Ref Range    Urine Culture Final report (A)     Result 1 Escherichia coli (A)     Result 2 Comment     Susceptibility Testing Comment    POCT Urinalysis Dipstick, Multipro (Automated dipstick)    Specimen: Urine   Result Value Ref Range    Color Yellow Yellow, Straw, Dark Yellow, Yue    Clarity, UA Clear Clear    Glucose, UA Negative Negative, 1000 mg/dL (3+) mg/dL    Bilirubin Negative Negative    Ketones, UA Negative Negative    Specific Gravity  1.015 1.005 - 1.030    Blood, UA  Negative Negative    pH, Urine 5.5 5.0 - 8.0    Protein, POC 2+ (A) Negative mg/dL    Urobilinogen, UA Normal Normal    Nitrite, UA Negative Negative    Leukocytes Small (1+) (A) Negative         Assessment/Plan       Diagnoses and all orders for this visit:    1. Routine general medical examination at a health care facility (Primary)    2. Acute right-sided low back pain without sciatica  -     Diclofenac Sodium (VOLTAREN) 1 % gel gel; Apply 4 g topically to the appropriate area as directed 4 (Four) Times a Day As Needed (pain).  Dispense: 100 g; Refill: 1      No follow-ups on file.          There are no Patient Instructions on file for this visit.     Yobany Nolasco MD    Assessment/Plan

## 2021-09-08 ENCOUNTER — APPOINTMENT (OUTPATIENT)
Dept: GENERAL RADIOLOGY | Facility: HOSPITAL | Age: 86
End: 2021-09-08

## 2021-09-08 ENCOUNTER — HOSPITAL ENCOUNTER (EMERGENCY)
Facility: HOSPITAL | Age: 86
Discharge: HOME | End: 2021-09-08
Attending: EMERGENCY MEDICINE | Admitting: EMERGENCY MEDICINE

## 2021-09-08 ENCOUNTER — APPOINTMENT (OUTPATIENT)
Dept: CT IMAGING | Facility: HOSPITAL | Age: 86
End: 2021-09-08

## 2021-09-08 VITALS
TEMPERATURE: 97.3 F | RESPIRATION RATE: 16 BRPM | SYSTOLIC BLOOD PRESSURE: 167 MMHG | HEIGHT: 71 IN | HEART RATE: 65 BPM | DIASTOLIC BLOOD PRESSURE: 82 MMHG | OXYGEN SATURATION: 95 % | WEIGHT: 177.6 LBS | BODY MASS INDEX: 24.86 KG/M2

## 2021-09-08 DIAGNOSIS — R63.0 DECREASED APPETITE: ICD-10-CM

## 2021-09-08 DIAGNOSIS — R53.83 FATIGUE, UNSPECIFIED TYPE: Primary | ICD-10-CM

## 2021-09-08 LAB
ALBUMIN SERPL-MCNC: 4.2 G/DL (ref 3.5–5.2)
ALBUMIN/GLOB SERPL: 1.4 G/DL
ALP SERPL-CCNC: 122 U/L (ref 39–117)
ALT SERPL W P-5'-P-CCNC: 9 U/L (ref 1–41)
ANION GAP SERPL CALCULATED.3IONS-SCNC: 9.6 MMOL/L (ref 5–15)
AST SERPL-CCNC: 17 U/L (ref 1–40)
BASOPHILS # BLD AUTO: 0.05 10*3/MM3 (ref 0–0.2)
BASOPHILS NFR BLD AUTO: 0.8 % (ref 0–1.5)
BILIRUB SERPL-MCNC: 0.6 MG/DL (ref 0–1.2)
BILIRUB UR QL STRIP: NEGATIVE
BUN SERPL-MCNC: 21 MG/DL (ref 8–23)
BUN/CREAT SERPL: 15.7 (ref 7–25)
CALCIUM SPEC-SCNC: 9.5 MG/DL (ref 8.6–10.5)
CHLORIDE SERPL-SCNC: 99 MMOL/L (ref 98–107)
CLARITY UR: CLEAR
CO2 SERPL-SCNC: 28.4 MMOL/L (ref 22–29)
COLOR UR: YELLOW
CREAT SERPL-MCNC: 1.34 MG/DL (ref 0.76–1.27)
DEPRECATED RDW RBC AUTO: 43.9 FL (ref 37–54)
EOSINOPHIL # BLD AUTO: 0.15 10*3/MM3 (ref 0–0.4)
EOSINOPHIL NFR BLD AUTO: 2.3 % (ref 0.3–6.2)
ERYTHROCYTE [DISTWIDTH] IN BLOOD BY AUTOMATED COUNT: 13.8 % (ref 12.3–15.4)
GFR SERPL CREATININE-BSD FRML MDRD: 50 ML/MIN/1.73
GLOBULIN UR ELPH-MCNC: 3 GM/DL
GLUCOSE SERPL-MCNC: 98 MG/DL (ref 65–99)
GLUCOSE UR STRIP-MCNC: NEGATIVE MG/DL
HCT VFR BLD AUTO: 39.6 % (ref 37.5–51)
HGB BLD-MCNC: 13.2 G/DL (ref 13–17.7)
HGB UR QL STRIP.AUTO: NEGATIVE
IMM GRANULOCYTES # BLD AUTO: 0.01 10*3/MM3 (ref 0–0.05)
IMM GRANULOCYTES NFR BLD AUTO: 0.2 % (ref 0–0.5)
KETONES UR QL STRIP: NEGATIVE
LEUKOCYTE ESTERASE UR QL STRIP.AUTO: NEGATIVE
LIPASE SERPL-CCNC: 29 U/L (ref 13–60)
LYMPHOCYTES # BLD AUTO: 1.59 10*3/MM3 (ref 0.7–3.1)
LYMPHOCYTES NFR BLD AUTO: 24.6 % (ref 19.6–45.3)
MCH RBC QN AUTO: 29.1 PG (ref 26.6–33)
MCHC RBC AUTO-ENTMCNC: 33.3 G/DL (ref 31.5–35.7)
MCV RBC AUTO: 87.4 FL (ref 79–97)
MONOCYTES # BLD AUTO: 0.61 10*3/MM3 (ref 0.1–0.9)
MONOCYTES NFR BLD AUTO: 9.4 % (ref 5–12)
NEUTROPHILS NFR BLD AUTO: 4.06 10*3/MM3 (ref 1.7–7)
NEUTROPHILS NFR BLD AUTO: 62.7 % (ref 42.7–76)
NITRITE UR QL STRIP: NEGATIVE
NRBC BLD AUTO-RTO: 0 /100 WBC (ref 0–0.2)
NT-PROBNP SERPL-MCNC: 473.1 PG/ML (ref 0–1800)
PH UR STRIP.AUTO: 5.5 [PH] (ref 5–8)
PLATELET # BLD AUTO: 204 10*3/MM3 (ref 140–450)
PMV BLD AUTO: 10.5 FL (ref 6–12)
POTASSIUM SERPL-SCNC: 4.4 MMOL/L (ref 3.5–5.2)
PROT SERPL-MCNC: 7.2 G/DL (ref 6–8.5)
PROT UR QL STRIP: NEGATIVE
RBC # BLD AUTO: 4.53 10*6/MM3 (ref 4.14–5.8)
SARS-COV-2 RNA PNL SPEC NAA+PROBE: NOT DETECTED
SODIUM SERPL-SCNC: 137 MMOL/L (ref 136–145)
SP GR UR STRIP: 1.01 (ref 1–1.03)
TROPONIN T SERPL-MCNC: 0.02 NG/ML (ref 0–0.03)
TROPONIN T SERPL-MCNC: 0.02 NG/ML (ref 0–0.03)
UROBILINOGEN UR QL STRIP: NORMAL
WBC # BLD AUTO: 6.47 10*3/MM3 (ref 3.4–10.8)

## 2021-09-08 PROCEDURE — 99283 EMERGENCY DEPT VISIT LOW MDM: CPT

## 2021-09-08 PROCEDURE — 80053 COMPREHEN METABOLIC PANEL: CPT | Performed by: PHYSICIAN ASSISTANT

## 2021-09-08 PROCEDURE — 87635 SARS-COV-2 COVID-19 AMP PRB: CPT | Performed by: PHYSICIAN ASSISTANT

## 2021-09-08 PROCEDURE — 84484 ASSAY OF TROPONIN QUANT: CPT | Performed by: PHYSICIAN ASSISTANT

## 2021-09-08 PROCEDURE — 93005 ELECTROCARDIOGRAM TRACING: CPT | Performed by: PHYSICIAN ASSISTANT

## 2021-09-08 PROCEDURE — 83690 ASSAY OF LIPASE: CPT | Performed by: PHYSICIAN ASSISTANT

## 2021-09-08 PROCEDURE — 85025 COMPLETE CBC W/AUTO DIFF WBC: CPT | Performed by: PHYSICIAN ASSISTANT

## 2021-09-08 PROCEDURE — 74176 CT ABD & PELVIS W/O CONTRAST: CPT

## 2021-09-08 PROCEDURE — 71045 X-RAY EXAM CHEST 1 VIEW: CPT

## 2021-09-08 PROCEDURE — 83880 ASSAY OF NATRIURETIC PEPTIDE: CPT | Performed by: PHYSICIAN ASSISTANT

## 2021-09-08 PROCEDURE — 81003 URINALYSIS AUTO W/O SCOPE: CPT | Performed by: PHYSICIAN ASSISTANT

## 2021-09-08 RX ORDER — SODIUM CHLORIDE 0.9 % (FLUSH) 0.9 %
10 SYRINGE (ML) INJECTION AS NEEDED
Status: DISCONTINUED | OUTPATIENT
Start: 2021-09-08 | End: 2021-09-08 | Stop reason: HOSPADM

## 2021-09-08 RX ADMIN — SODIUM CHLORIDE 500 ML: 9 INJECTION, SOLUTION INTRAVENOUS at 13:28

## 2021-09-08 NOTE — ED PROVIDER NOTES
Subjective   Patient is a 87-year-old male with history of arthritis, colon polyps, frequent urinary tract infections, gallstones, GERD, hypertension and prostate cancer presenting to the ER for evaluation of generalized weakness.  He states for the past week he has just felt weak, fatigued with loss of appetite.  He states he does feel a bit dizzy as well.  He states that he has had some dysuria recently.  He states last night he did feel like he had some shortness of breath, denies any shortness of breath at this time.  He denies any known fever, chills, headache, vision loss or changes, syncopal episode, chest pain, productive cough, severe abdominal pain, diarrhea, leg pain or swelling, or any other symptoms.  He states that he has had his Covid vaccinations.          Review of Systems   Constitutional: Positive for fatigue. Negative for chills and fever.   HENT: Negative.    Eyes: Negative.    Respiratory: Negative.    Cardiovascular: Negative.    Gastrointestinal: Negative for abdominal pain, diarrhea, nausea and vomiting.   Genitourinary: Positive for dysuria.   Musculoskeletal: Negative.    Skin: Negative.    Allergic/Immunologic: Negative for immunocompromised state.   Neurological: Positive for light-headedness. Negative for dizziness.   Psychiatric/Behavioral: Negative.        Past Medical History:   Diagnosis Date   • Arthritis    • Colon polyps    • Depression    • Fluid in knee    • Frequent UTI    • Gallstones    • GERD (gastroesophageal reflux disease)    • History of anemia    • History of blood transfusion    • History of bronchitis    • History of colonic polyps    • History of fibromyalgia    • History of gallstones    • Hypertension    • Prostate cancer (CMS/HCC)    • Sinusitis        No Known Allergies    Past Surgical History:   Procedure Laterality Date   • CHOLECYSTECTOMY  2005   • COLONOSCOPY  2013   • UPPER GASTROINTESTINAL ENDOSCOPY  2013       Family History   Problem Relation Age of  "Onset   • Cancer Mother    • Migraines Mother    • Osteoporosis Mother        Social History     Socioeconomic History   • Marital status:      Spouse name: Not on file   • Number of children: Not on file   • Years of education: Not on file   • Highest education level: Not on file   Tobacco Use   • Smoking status: Never Smoker   • Smokeless tobacco: Never Used   Substance and Sexual Activity   • Alcohol use: No   • Drug use: No   • Sexual activity: Defer           Objective   Physical Exam  Vitals and nursing note reviewed.     /84 (BP Location: Right arm, Patient Position: Sitting)   Pulse 66   Temp 97.3 °F (36.3 °C) (Oral)   Resp 17   Ht 180.3 cm (71\")   Wt 80.6 kg (177 lb 9.6 oz)   SpO2 97%   BMI 24.77 kg/m²     GEN: No acute distress, sitting up in stretcher.  He is awake and alert.  He is very pleasant.  He does not appear septic or toxic.  He is answering questions appropriately.  Head: Normocephalic, atraumatic  Eyes: EOM intact  ENT: Mask in place per protocol  Cardiovascular: Regular rate and rhythm  Lungs: Clear to auscultation bilaterally without adventitious sounds  Abdomen: Nondistended.  Patient does have some diffuse abdominal tenderness with guarding  Extremities: No edema, normal appearance, full range of motion without deficits  Neuro: GCS 15  Psych: Mood and affect are appropriate    Procedures           ED Course  ED Course as of Sep 08 1620   Wed Sep 08, 2021   1355 Troponin T: 0.020 [LA]   1356 Lipase: 29 [LA]   1356 Color, UA: Yellow [LA]   1356 Appearance, UA: Clear [LA]   1356 pH, UA: 5.5 [LA]   1356 Specific Gravity, UA: 1.014 [LA]   1356 Glucose: Negative [LA]   1356 Ketones, UA: Negative [LA]   1356 Bilirubin, UA: Negative [LA]   1356 Blood, UA: Negative [LA]   1356 Protein, UA: Negative [LA]   1356 Leukocytes, UA: Negative [LA]   1356 Nitrite, UA: Negative [LA]   1356 Urobilinogen, UA: 0.2 E.U./dL [LA]   1356 proBNP: 473.1 [LA]   1356 Glucose: 98 [LA]   1356 BUN: 21 " [LA]   1356 Creatinine(!): 1.34 [LA]   1356 Sodium: 137 [LA]   1356 Potassium: 4.4 [LA]   1356 Chloride: 99 [LA]   1356 CO2: 28.4 [LA]   1356 Calcium: 9.5 [LA]   1356 Total Protein: 7.2 [LA]   1356 Albumin: 4.20 [LA]   1356 ALT (SGPT): 9 [LA]   1356 AST (SGOT): 17 [LA]   1356 Alkaline Phosphatase(!): 122 [LA]   1356 Total Bilirubin: 0.6 [LA]   1356 eGFR Non  Am(!): 50 [LA]   1356 Globulin: 3.0 [LA]   1356 A/G Ratio: 1.4 [LA]   1356 BUN/Creatinine Ratio: 15.7 [LA]   1356 Anion Gap: 9.6 [LA]   1356 WBC: 6.47 [LA]   1356 Hemoglobin: 13.2 [LA]   1358 Narrative & Impression  PROCEDURE: XR CHEST 1 VW-     HISTORY: Generalized weakness     COMPARISON: 8/8/2020.     FINDINGS: The heart is normal in size. The mediastinum is unremarkable.  The lungs are clear. There is no pneumothorax.  There are no acute  osseous abnormalities.     IMPRESSION:  No acute cardiopulmonary process.     Continued followup is recommended.     This report was finalized on 9/8/2021 1:43 PM by Baljeet Patino M.D..    [LA]   1358 Patient's creatinine has actually improved in comparison to previous.    [LA]   1358 COVID19: Not Detected [LA]   1425 EKG interpreted by me reveals sinus rhythm with a rate of 61 bpm.  There is first-degree AV block.  This is an abnormal appearing EKG.    [TB]   1438 PROCEDURE: CT ABDOMEN PELVIS WO CONTRAST-     HISTORY: Generalized weakness, dysuria, diffuse abdominal tenderness     COMPARISON: None .     PROCEDURE: Axial images were obtained from the lung bases through the  pubic symphysis without intravenous contrast. .      FINDINGS:      ABDOMEN: The lung bases are clear. The heart size is normal. The limited  noncontrast images of the liver are normal. The spleen is normal. No  adrenal masses are seen.  The pancreas has an unremarkable unenhanced  appearance.. The aorta is normal in caliber. There is no significant  free fluid or adenopathy.  There is no nephrolithiasis. There is no  hydronephrosis. Limited  noncontrast images of the bowel are  unremarkable. There is a ventral hernia which contains nonobstructed  portions of the transverse colon.     PELVIS: The appendix is normal. There are colonic diverticula without CT  evidence of diverticulitis. The urinary bladder is unremarkable. The  prostate gland is enlarged. There is no significant fluid or adenopathy.     IMPRESSION:  No hydronephrosis or nephrolithiasis.              698.10 mGy.cm.  12.77 mGy     This study was performed with techniques to keep radiation doses as low  as reasonably achievable (ALARA). Individualized dose reduction  techniques using automated exposure control or adjustment of mA and/or  kV according to the patient size were employed.      This report was finalized on 9/8/2021 2:34 PM by Baljeet Patino M.D..    [LA]   1511 Discussed findings with the patient as far.  He is talking on the phone in the stretcher.  He denies any symptoms or pain at this time.    [LA]   1543 RN informed that patient states he has not been eating or drinking very much at home since his wife passed away he thinks this may be causing some of his fatigue.  She offered him food at this time.  He said he would take some juice.    [LA]   1613 Troponin T: 0.018 [LA]   1613 Discussed with the patient.  He states he feels well to go home.  He has eaten a sandwich and some chips. I did discuss that he needs to follow-up with his primary care provider, they may need to do additional testing.  I did remind him that even though he does not have much of an appetite, he should try to eat small meals throughout the day.  Discussed strict return precautions.  He verbalized understanding and was in agreement with this plan of care    [LA]      ED Course User Index  [LA] Acacia Cortez PA-C  [TB] Erendira Lindo MD                                           MDM  Number of Diagnoses or Management Options  Decreased appetite  Fatigue, unspecified type  Diagnosis  management comments: On arrival, patient stable.  Differential could include viral illness, electrolyte abnormalities, cardiac dysrhythmia, ACS, urinary tract infection, dehydration, intra-abdominal affection, and other concerns.  Low concern for any kind of CVA or intracranial hemorrhage, do not see any focal neurological deficits. He denies any vision changes or headaches.  Will obtain COVID-19 swab, basic labs, troponin, proBNP, chest x-ray, EKG, urinalysis.  Will give IV fluids.  Given the abdominal guarding, will obtain CT abdomen pelvis as well.    Labs were all stable here.  Initial troponin was 0.020, patient was denying any chest pain at this time.  EKG was interpreted by the attending.  Chest x-ray was read by the radiologist without abnormalities.  They also read the CT abdomen pelvis that did not show any findings.  There is no elevation of proBNP, no urinary tract infection.  Patient's creatinine was mildly elevated but actually improved in comparison to previous.  Serial troponin remained negative.  Patient remained well-appearing and stable here.  He did let the nurse know that he had not eaten much in the past few days, has been struggling with this since his wife passed away.  We did give him a sandwich and chips here.  Do believe he is stable at this time for discharge with close follow-up with his primary care provider.  Discussed follow-up and strict return precautions.  He verbalized understanding and was in agreement with this plan of care       Amount and/or Complexity of Data Reviewed  Clinical lab tests: ordered and reviewed  Tests in the radiology section of CPT®: ordered and reviewed  Discussion of test results with the performing providers: yes  Decide to obtain previous medical records or to obtain history from someone other than the patient: yes  Review and summarize past medical records: yes  Discuss the patient with other providers: yes    Risk of Complications, Morbidity, and/or  Mortality  Presenting problems: moderate  Diagnostic procedures: moderate  Management options: low    Patient Progress  Patient progress: stable      Final diagnoses:   Fatigue, unspecified type   Decreased appetite       ED Disposition  ED Disposition     ED Disposition Condition Comment    Discharge Stable           Yobany Nolasco MD  [address] [place] [id number] [phone]    Schedule an appointment as soon as possible for a visit            Medication List      No changes were made to your prescriptions during this visit.          Acacia Cortez PA-C  09/08/21 [id number]

## 2021-09-08 NOTE — DISCHARGE INSTRUCTIONS
Try to make sure you are eating small well-balanced meals throughout the day to keep up your energy.  Drink plenty of fluids to stay well-hydrated.  Try to follow-up with your primary doctor in the next few days to reevaluate symptoms and ensure you are improving.  They may have additional tests they will want to run.  Return to the ER for any change, worsening symptoms, or any additional concerns including but not limited to severe worsening weakness, severe headache, chest pain, dizziness, shortness of breath.

## 2021-09-28 DIAGNOSIS — R42 VERTIGO: ICD-10-CM

## 2021-09-28 DIAGNOSIS — R53.1 WEAKNESS GENERALIZED: ICD-10-CM

## 2021-09-28 DIAGNOSIS — A79.9 RICKETTSIOSES: ICD-10-CM

## 2021-09-28 DIAGNOSIS — M10.079 IDIOPATHIC GOUT OF FOOT, UNSPECIFIED CHRONICITY, UNSPECIFIED LATERALITY: ICD-10-CM

## 2021-09-28 DIAGNOSIS — R97.20 ELEVATED PSA: ICD-10-CM

## 2021-09-28 DIAGNOSIS — R53.83 FATIGUE, UNSPECIFIED TYPE: ICD-10-CM

## 2021-09-28 RX ORDER — ALLOPURINOL 100 MG/1
TABLET ORAL
Qty: 30 TABLET | Refills: 11 | Status: SHIPPED | OUTPATIENT
Start: 2021-09-28 | End: 2022-11-11

## 2021-09-28 NOTE — TELEPHONE ENCOUNTER
Rx Refill Note  Requested Prescriptions     Pending Prescriptions Disp Refills   • allopurinol (ZYLOPRIM) 100 MG tablet [Pharmacy Med Name: ALLOPURINOL 100 MG  Tablet] 30 tablet 11     Sig: TAKE ONE TABLET BY MOUTH EVERY DAY      Last office visit with prescribing clinician: 7/23/2021      Next office visit with prescribing clinician: Visit date not found            TOD DOTSON MA  09/28/21, 11:57 EDT

## 2021-11-30 DIAGNOSIS — I10 ESSENTIAL HYPERTENSION: ICD-10-CM

## 2021-11-30 RX ORDER — IRBESARTAN AND HYDROCHLOROTHIAZIDE 300; 12.5 MG/1; MG/1
TABLET, FILM COATED ORAL
Qty: 90 TABLET | Refills: 3 | Status: SHIPPED | OUTPATIENT
Start: 2021-11-30

## 2021-12-21 DIAGNOSIS — I10 ESSENTIAL HYPERTENSION: ICD-10-CM

## 2021-12-21 RX ORDER — CARVEDILOL 3.12 MG/1
TABLET ORAL
Qty: 60 TABLET | Refills: 11 | Status: SHIPPED | OUTPATIENT
Start: 2021-12-21 | End: 2023-01-05

## 2021-12-21 NOTE — TELEPHONE ENCOUNTER
Rx Refill Note  Requested Prescriptions     Pending Prescriptions Disp Refills   • carvedilol (COREG) 3.125 MG tablet [Pharmacy Med Name: CARVEDILOL 3.125MG TAB 3.125 Tablet] 60 tablet 11     Sig: TAKE ONE TABLET BY MOUTH TWICE DAILY WITH MEALS      Last office visit with prescribing clinician: 7/23/2021      Next office visit with prescribing clinician: Visit date not found            TOD DOTSON MA  12/21/21, 14:11 EST

## 2022-02-25 ENCOUNTER — OFFICE VISIT (OUTPATIENT)
Dept: INTERNAL MEDICINE | Facility: CLINIC | Age: 87
End: 2022-02-25

## 2022-02-25 VITALS
TEMPERATURE: 97.7 F | DIASTOLIC BLOOD PRESSURE: 80 MMHG | SYSTOLIC BLOOD PRESSURE: 150 MMHG | BODY MASS INDEX: 24.83 KG/M2 | WEIGHT: 178 LBS | HEART RATE: 67 BPM | OXYGEN SATURATION: 98 %

## 2022-02-25 DIAGNOSIS — I10 ESSENTIAL HYPERTENSION: ICD-10-CM

## 2022-02-25 DIAGNOSIS — I10 PRIMARY HYPERTENSION: Primary | ICD-10-CM

## 2022-02-25 DIAGNOSIS — N18.2 STAGE 2 CHRONIC KIDNEY DISEASE: ICD-10-CM

## 2022-02-25 PROCEDURE — 99214 OFFICE O/P EST MOD 30 MIN: CPT | Performed by: INTERNAL MEDICINE

## 2022-02-25 RX ORDER — AMLODIPINE BESYLATE 10 MG/1
10 TABLET ORAL DAILY
Qty: 90 TABLET | Refills: 5 | Status: SHIPPED | OUTPATIENT
Start: 2022-02-25

## 2022-02-25 NOTE — PROGRESS NOTES
Subjective     Patient ID: Sukumar Cosby is a 88 y.o. male. Patient is here for management of multiple medical problems.     Chief Complaint   Patient presents with   • Follow-up   • Difficulty Urinating     History of Present Illness           The following portions of the patient's history were reviewed and updated as appropriate: allergies, current medications, past family history, past medical history, past social history, past surgical history and problem list.    Review of Systems    Current Outpatient Medications:   •  allopurinol (ZYLOPRIM) 100 MG tablet, TAKE ONE TABLET BY MOUTH EVERY DAY, Disp: 30 tablet, Rfl: 11  •  amLODIPine (NORVASC) 10 MG tablet, Take 1 tablet by mouth Daily., Disp: 90 tablet, Rfl: 5  •  carvedilol (COREG) 3.125 MG tablet, TAKE ONE TABLET BY MOUTH TWICE DAILY WITH MEALS, Disp: 60 tablet, Rfl: 11  •  Diclofenac Sodium (VOLTAREN) 1 % gel gel, Apply 4 g topically to the appropriate area as directed 4 (Four) Times a Day As Needed (pain)., Disp: 100 g, Rfl: 1  •  irbesartan-hydrochlorothiazide (AVALIDE) 300-12.5 MG tablet, TAKE ONE TABLET BY MOUTH EVERY DAY, Disp: 90 tablet, Rfl: 3  •  meclizine (ANTIVERT) 25 MG tablet, TAKE ONE TABLET BY MOUTH EVERY 8 HOURS, Disp: 30 tablet, Rfl: 12  •  traMADol (ULTRAM) 50 MG tablet, tramadol 50 mg tablet, Disp: , Rfl:   •  ciprofloxacin (CIPRO) 500 MG tablet, 1 po bid, Disp: 14 tablet, Rfl: 0    Objective      Blood pressure 150/80, pulse 67, temperature 97.7 °F (36.5 °C), weight 80.7 kg (178 lb), SpO2 98 %.    Physical Exam     General Appearance:    Alert, cooperative, no distress, appears stated age   Head:    Normocephalic, without obvious abnormality, atraumatic   Eyes:    PERRL, conjunctiva/corneas clear, EOM's intact   Ears:    Normal TM's and external ear canals, both ears   Nose:   Nares normal, septum midline, mucosa normal, no drainage   or sinus tenderness   Throat:   Lips, mucosa, and tongue normal; teeth and gums normal   Neck:   Supple,  symmetrical, trachea midline, no adenopathy;        thyroid:  No enlargement/tenderness/nodules; no carotid    bruit or JVD   Back:     Symmetric, no curvature, ROM normal, no CVA tenderness   Lungs:     Clear to auscultation bilaterally, respirations unlabored   Chest wall:    No tenderness or deformity   Heart:    Regular rate and rhythm, S1 and S2 normal, no murmur,        rub or gallop   Abdomen:     Soft, non-tender, bowel sounds active all four quadrants,     no masses, no organomegaly   Extremities:   Extremities normal, atraumatic, no cyanosis or edema   Pulses:   2+ and symmetric all extremities   Skin:   Skin color, texture, turgor normal, no rashes or lesions   Lymph nodes:   Cervical, supraclavicular, and axillary nodes normal   Neurologic:   CNII-XII intact. Normal strength, sensation and reflexes       throughout      Results for orders placed or performed during the hospital encounter of 02/10/22   POC Urinalysis Dipstick, Multipro (Automated dipstick)    Specimen: Urine   Result Value Ref Range    Color Yellow Yellow, Straw, Dark Yellow, Yue    Clarity, UA Clear Clear    Glucose, UA Negative Negative, 1000 mg/dL (3+) mg/dL    Bilirubin Negative Negative    Ketones, UA Negative Negative    Specific Gravity  1.020 1.005 - 1.030    Blood, UA Negative Negative    pH, Urine 5.5 5.0 - 8.0    Protein, POC Negative Negative mg/dL    Urobilinogen, UA Normal Normal    Nitrite, UA Negative Negative    Leukocytes Negative Negative   POC Glucose Once    Specimen: Blood   Result Value Ref Range    Glucose 103 70 - 130 mg/dL         Assessment/Plan     bp running a bit high. Will increase norvasc.        Diagnoses and all orders for this visit:    1. Primary hypertension (Primary)  -     Comprehensive Metabolic Panel  -     Vitamin B12  -     CBC & Differential  -     Lipid Panel  -     TSH  -     T4, Free    2. Stage 2 chronic kidney disease  -     Comprehensive Metabolic Panel  -     Vitamin B12  -     CBC &  Differential  -     Lipid Panel  -     TSH  -     T4, Free    3. Essential hypertension  -     amLODIPine (NORVASC) 10 MG tablet; Take 1 tablet by mouth Daily.  Dispense: 90 tablet; Refill: 5  -     Comprehensive Metabolic Panel  -     Vitamin B12  -     CBC & Differential  -     Lipid Panel  -     TSH  -     T4, Free      Return in about 3 months (around 5/25/2022).          There are no Patient Instructions on file for this visit.     Yobany Nolasco MD    Assessment/Plan

## 2022-04-30 ENCOUNTER — HOSPITAL ENCOUNTER (EMERGENCY)
Facility: HOSPITAL | Age: 87
Discharge: HOME | End: 2022-04-30
Attending: EMERGENCY MEDICINE | Admitting: EMERGENCY MEDICINE

## 2022-04-30 ENCOUNTER — APPOINTMENT (OUTPATIENT)
Dept: CT IMAGING | Facility: HOSPITAL | Age: 87
End: 2022-04-30

## 2022-04-30 VITALS
TEMPERATURE: 97.5 F | OXYGEN SATURATION: 100 % | HEIGHT: 71 IN | WEIGHT: 187 LBS | BODY MASS INDEX: 26.18 KG/M2 | DIASTOLIC BLOOD PRESSURE: 71 MMHG | HEART RATE: 67 BPM | SYSTOLIC BLOOD PRESSURE: 127 MMHG | RESPIRATION RATE: 18 BRPM

## 2022-04-30 DIAGNOSIS — J32.9 CHRONIC SINUSITIS, UNSPECIFIED LOCATION: ICD-10-CM

## 2022-04-30 DIAGNOSIS — R10.9 ABDOMINAL PAIN, UNSPECIFIED ABDOMINAL LOCATION: Primary | ICD-10-CM

## 2022-04-30 LAB
ALBUMIN SERPL-MCNC: 4.4 G/DL (ref 3.5–5.2)
ALBUMIN/GLOB SERPL: 1.5 G/DL
ALP SERPL-CCNC: 117 U/L (ref 39–117)
ALT SERPL W P-5'-P-CCNC: 8 U/L (ref 1–41)
ANION GAP SERPL CALCULATED.3IONS-SCNC: 9.2 MMOL/L (ref 5–15)
AST SERPL-CCNC: 14 U/L (ref 1–40)
BASOPHILS # BLD AUTO: 0.03 10*3/MM3 (ref 0–0.2)
BASOPHILS NFR BLD AUTO: 0.5 % (ref 0–1.5)
BILIRUB SERPL-MCNC: 0.3 MG/DL (ref 0–1.2)
BILIRUB UR QL STRIP: NEGATIVE
BUN SERPL-MCNC: 28 MG/DL (ref 8–23)
BUN/CREAT SERPL: 21.4 (ref 7–25)
CALCIUM SPEC-SCNC: 9.4 MG/DL (ref 8.6–10.5)
CHLORIDE SERPL-SCNC: 104 MMOL/L (ref 98–107)
CLARITY UR: CLEAR
CO2 SERPL-SCNC: 26.8 MMOL/L (ref 22–29)
COLOR UR: YELLOW
CREAT SERPL-MCNC: 1.31 MG/DL (ref 0.76–1.27)
DEPRECATED RDW RBC AUTO: 42.2 FL (ref 37–54)
EGFRCR SERPLBLD CKD-EPI 2021: 52.4 ML/MIN/1.73
EOSINOPHIL # BLD AUTO: 0.11 10*3/MM3 (ref 0–0.4)
EOSINOPHIL NFR BLD AUTO: 1.8 % (ref 0.3–6.2)
ERYTHROCYTE [DISTWIDTH] IN BLOOD BY AUTOMATED COUNT: 13.4 % (ref 12.3–15.4)
ETHANOL BLD-MCNC: <10 MG/DL (ref 0–10)
ETHANOL UR QL: <0.01 %
GLOBULIN UR ELPH-MCNC: 2.9 GM/DL
GLUCOSE SERPL-MCNC: 102 MG/DL (ref 65–99)
GLUCOSE UR STRIP-MCNC: NEGATIVE MG/DL
HCT VFR BLD AUTO: 34.3 % (ref 37.5–51)
HGB BLD-MCNC: 11.8 G/DL (ref 13–17.7)
HGB UR QL STRIP.AUTO: NEGATIVE
HOLD SPECIMEN: NORMAL
HOLD SPECIMEN: NORMAL
IMM GRANULOCYTES # BLD AUTO: 0.02 10*3/MM3 (ref 0–0.05)
IMM GRANULOCYTES NFR BLD AUTO: 0.3 % (ref 0–0.5)
KETONES UR QL STRIP: NEGATIVE
LEUKOCYTE ESTERASE UR QL STRIP.AUTO: NEGATIVE
LYMPHOCYTES # BLD AUTO: 1.61 10*3/MM3 (ref 0.7–3.1)
LYMPHOCYTES NFR BLD AUTO: 25.7 % (ref 19.6–45.3)
MCH RBC QN AUTO: 29.8 PG (ref 26.6–33)
MCHC RBC AUTO-ENTMCNC: 34.4 G/DL (ref 31.5–35.7)
MCV RBC AUTO: 86.6 FL (ref 79–97)
MONOCYTES # BLD AUTO: 0.64 10*3/MM3 (ref 0.1–0.9)
MONOCYTES NFR BLD AUTO: 10.2 % (ref 5–12)
NEUTROPHILS NFR BLD AUTO: 3.86 10*3/MM3 (ref 1.7–7)
NEUTROPHILS NFR BLD AUTO: 61.5 % (ref 42.7–76)
NITRITE UR QL STRIP: NEGATIVE
NRBC BLD AUTO-RTO: 0 /100 WBC (ref 0–0.2)
PH UR STRIP.AUTO: <=5 [PH] (ref 5–8)
PLATELET # BLD AUTO: 220 10*3/MM3 (ref 140–450)
PMV BLD AUTO: 9.9 FL (ref 6–12)
POTASSIUM SERPL-SCNC: 5 MMOL/L (ref 3.5–5.2)
PROT SERPL-MCNC: 7.3 G/DL (ref 6–8.5)
PROT UR QL STRIP: NEGATIVE
RBC # BLD AUTO: 3.96 10*6/MM3 (ref 4.14–5.8)
SODIUM SERPL-SCNC: 140 MMOL/L (ref 136–145)
SP GR UR STRIP: >=1.03 (ref 1–1.03)
UROBILINOGEN UR QL STRIP: NORMAL
WBC NRBC COR # BLD: 6.27 10*3/MM3 (ref 3.4–10.8)
WHOLE BLOOD HOLD SPECIMEN: NORMAL
WHOLE BLOOD HOLD SPECIMEN: NORMAL

## 2022-04-30 PROCEDURE — 74177 CT ABD & PELVIS W/CONTRAST: CPT

## 2022-04-30 PROCEDURE — 99283 EMERGENCY DEPT VISIT LOW MDM: CPT

## 2022-04-30 PROCEDURE — 81003 URINALYSIS AUTO W/O SCOPE: CPT

## 2022-04-30 PROCEDURE — 80053 COMPREHEN METABOLIC PANEL: CPT

## 2022-04-30 PROCEDURE — 70450 CT HEAD/BRAIN W/O DYE: CPT

## 2022-04-30 PROCEDURE — 71275 CT ANGIOGRAPHY CHEST: CPT

## 2022-04-30 PROCEDURE — 93005 ELECTROCARDIOGRAM TRACING: CPT

## 2022-04-30 PROCEDURE — 25010000002 IOPAMIDOL 61 % SOLUTION: Performed by: EMERGENCY MEDICINE

## 2022-04-30 PROCEDURE — 82077 ASSAY SPEC XCP UR&BREATH IA: CPT

## 2022-04-30 PROCEDURE — 85025 COMPLETE CBC W/AUTO DIFF WBC: CPT

## 2022-04-30 RX ORDER — AMOXICILLIN AND CLAVULANATE POTASSIUM 875; 125 MG/1; MG/1
1 TABLET, FILM COATED ORAL 2 TIMES DAILY
Qty: 28 TABLET | Refills: 0 | Status: SHIPPED | OUTPATIENT
Start: 2022-04-30 | End: 2022-05-14

## 2022-04-30 RX ORDER — SODIUM CHLORIDE 0.9 % (FLUSH) 0.9 %
10 SYRINGE (ML) INJECTION AS NEEDED
Status: DISCONTINUED | OUTPATIENT
Start: 2022-04-30 | End: 2022-04-30 | Stop reason: HOSPADM

## 2022-04-30 RX ADMIN — IOPAMIDOL 100 ML: 612 INJECTION, SOLUTION INTRAVENOUS at 13:21

## 2022-05-03 ENCOUNTER — PATIENT OUTREACH (OUTPATIENT)
Dept: CASE MANAGEMENT | Facility: OTHER | Age: 87
End: 2022-05-03

## 2022-05-03 NOTE — OUTREACH NOTE
AMBULATORY CASE MANAGEMENT NOTE    Name and Relationship of Patient/Support Person:  -     Patient Outreach    RN-ACM outreach with patient.  Patient had an ED visit at [facility] 04/30/22.  Patient presented with c/o abdominal pain after a fall. Ventral hernia containing fat and a portion of the transverse colon noted.   Patient was treated and discharged to home to follow with PCP.      AVS, education, and recommended f/u reviewed.  Patient v/u and requested assistance with scheduling f/u.  Patient prefers appointment with Dr. Nolasco even if he can not get in quickly.    F/u appointment scheduled for 05/23/22 at 10:45 am.      Other questions/concerns for RN-ACM to address were denied.    MAULIK ACEVEDO  Ambulatory Case Management    5/3/2022, 10:32 EDT

## 2022-05-23 ENCOUNTER — OFFICE VISIT (OUTPATIENT)
Dept: INTERNAL MEDICINE | Facility: CLINIC | Age: 87
End: 2022-05-23

## 2022-05-23 VITALS
SYSTOLIC BLOOD PRESSURE: 124 MMHG | BODY MASS INDEX: 25.48 KG/M2 | DIASTOLIC BLOOD PRESSURE: 62 MMHG | TEMPERATURE: 97.3 F | HEART RATE: 65 BPM | WEIGHT: 182 LBS | OXYGEN SATURATION: 98 % | HEIGHT: 71 IN

## 2022-05-23 DIAGNOSIS — K46.9 NON-RECURRENT ABDOMINAL HERNIA WITHOUT OBSTRUCTION OR GANGRENE, UNSPECIFIED HERNIA TYPE: Primary | ICD-10-CM

## 2022-05-23 PROCEDURE — 99213 OFFICE O/P EST LOW 20 MIN: CPT | Performed by: INTERNAL MEDICINE

## 2022-05-23 NOTE — PROGRESS NOTES
"Subjective     Patient ID: Sukumar Cosby is a 88 y.o. male. Patient is here for management of multiple medical problems.     Chief Complaint   Patient presents with   • Abdominal Pain     Denies pain. He was told to follow up for a hernia from the ER on 4/30/2022     History of Present Illness       Fall recently..         The following portions of the patient's history were reviewed and updated as appropriate: allergies, current medications, past family history, past medical history, past social history, past surgical history and problem list.    Review of Systems    Current Outpatient Medications:   •  allopurinol (ZYLOPRIM) 100 MG tablet, TAKE ONE TABLET BY MOUTH EVERY DAY, Disp: 30 tablet, Rfl: 11  •  amLODIPine (NORVASC) 10 MG tablet, Take 1 tablet by mouth Daily., Disp: 90 tablet, Rfl: 5  •  carvedilol (COREG) 3.125 MG tablet, TAKE ONE TABLET BY MOUTH TWICE DAILY WITH MEALS, Disp: 60 tablet, Rfl: 11  •  ciprofloxacin (CIPRO) 500 MG tablet, 1 po bid, Disp: 14 tablet, Rfl: 0  •  Diclofenac Sodium (VOLTAREN) 1 % gel gel, Apply 4 g topically to the appropriate area as directed 4 (Four) Times a Day As Needed (pain)., Disp: 100 g, Rfl: 1  •  irbesartan-hydrochlorothiazide (AVALIDE) 300-12.5 MG tablet, TAKE ONE TABLET BY MOUTH EVERY DAY, Disp: 90 tablet, Rfl: 3  •  meclizine (ANTIVERT) 25 MG tablet, TAKE ONE TABLET BY MOUTH EVERY 8 HOURS, Disp: 30 tablet, Rfl: 12  •  traMADol (ULTRAM) 50 MG tablet, tramadol 50 mg tablet, Disp: , Rfl:     Objective      Blood pressure 124/62, pulse 65, temperature 97.3 °F (36.3 °C), height 180.3 cm (71\"), weight 82.6 kg (182 lb), SpO2 98 %.    Physical Exam     General Appearance:    Alert, cooperative, no distress, appears stated age   Head:    Normocephalic, without obvious abnormality, atraumatic   Eyes:    PERRL, conjunctiva/corneas clear, EOM's intact   Ears:    Normal TM's and external ear canals, both ears   Nose:   Nares normal, septum midline, mucosa normal, no drainage   or " sinus tenderness   Throat:   Lips, mucosa, and tongue normal; teeth and gums normal   Neck:   Supple, symmetrical, trachea midline, no adenopathy;        thyroid:  No enlargement/tenderness/nodules; no carotid    bruit or JVD   Back:     Symmetric, no curvature, ROM normal, no CVA tenderness   Lungs:     Clear to auscultation bilaterally, respirations unlabored   Chest wall:    No tenderness or deformity   Heart:    Regular rate and rhythm, S1 and S2 normal, no murmur,        rub or gallop   Abdomen:     Soft, non-tender, larg ab hernia on left,  bowel sounds active all four quadrants,     no masses, no organomegaly   Extremities:   Extremities normal, atraumatic, no cyanosis or edema   Pulses:   2+ and symmetric all extremities   Skin:   Skin color, texture, turgor normal, no rashes or lesions   Lymph nodes:   Cervical, supraclavicular, and axillary nodes normal   Neurologic:   CNII-XII intact. Normal strength, sensation and reflexes       throughout      Results for orders placed or performed during the hospital encounter of 04/30/22   Comprehensive Metabolic Panel    Specimen: Blood   Result Value Ref Range    Glucose 102 (H) 65 - 99 mg/dL    BUN 28 (H) 8 - 23 mg/dL    Creatinine 1.31 (H) 0.76 - 1.27 mg/dL    Sodium 140 136 - 145 mmol/L    Potassium 5.0 3.5 - 5.2 mmol/L    Chloride 104 98 - 107 mmol/L    CO2 26.8 22.0 - 29.0 mmol/L    Calcium 9.4 8.6 - 10.5 mg/dL    Total Protein 7.3 6.0 - 8.5 g/dL    Albumin 4.40 3.50 - 5.20 g/dL    ALT (SGPT) 8 1 - 41 U/L    AST (SGOT) 14 1 - 40 U/L    Alkaline Phosphatase 117 39 - 117 U/L    Total Bilirubin 0.3 0.0 - 1.2 mg/dL    Globulin 2.9 gm/dL    A/G Ratio 1.5 g/dL    BUN/Creatinine Ratio 21.4 7.0 - 25.0    Anion Gap 9.2 5.0 - 15.0 mmol/L    eGFR 52.4 (L) >60.0 mL/min/1.73   CBC Auto Differential    Specimen: Blood   Result Value Ref Range    WBC 6.27 3.40 - 10.80 10*3/mm3    RBC 3.96 (L) 4.14 - 5.80 10*6/mm3    Hemoglobin 11.8 (L) 13.0 - 17.7 g/dL    Hematocrit 34.3 (L)  37.5 - 51.0 %    MCV 86.6 79.0 - 97.0 fL    MCH 29.8 26.6 - 33.0 pg    MCHC 34.4 31.5 - 35.7 g/dL    RDW 13.4 12.3 - 15.4 %    RDW-SD 42.2 37.0 - 54.0 fl    MPV 9.9 6.0 - 12.0 fL    Platelets 220 140 - 450 10*3/mm3    Neutrophil % 61.5 42.7 - 76.0 %    Lymphocyte % 25.7 19.6 - 45.3 %    Monocyte % 10.2 5.0 - 12.0 %    Eosinophil % 1.8 0.3 - 6.2 %    Basophil % 0.5 0.0 - 1.5 %    Immature Grans % 0.3 0.0 - 0.5 %    Neutrophils, Absolute 3.86 1.70 - 7.00 10*3/mm3    Lymphocytes, Absolute 1.61 0.70 - 3.10 10*3/mm3    Monocytes, Absolute 0.64 0.10 - 0.90 10*3/mm3    Eosinophils, Absolute 0.11 0.00 - 0.40 10*3/mm3    Basophils, Absolute 0.03 0.00 - 0.20 10*3/mm3    Immature Grans, Absolute 0.02 0.00 - 0.05 10*3/mm3    nRBC 0.0 0.0 - 0.2 /100 WBC   Ethanol    Specimen: Blood   Result Value Ref Range    Ethanol <10 0 - 10 mg/dL    Ethanol % <0.010 %   Urinalysis With Microscopic If Indicated (No Culture) - Urine, Clean Catch    Specimen: Urine, Clean Catch   Result Value Ref Range    Color, UA Yellow Yellow, Straw    Appearance, UA Clear Clear    pH, UA <=5.0 5.0 - 8.0    Specific Gravity, UA >=1.030 1.005 - 1.030    Glucose, UA Negative Negative    Ketones, UA Negative Negative    Bilirubin, UA Negative Negative    Blood, UA Negative Negative    Protein, UA Negative Negative    Leuk Esterase, UA Negative Negative    Nitrite, UA Negative Negative    Urobilinogen, UA 0.2 E.U./dL 0.2 - 1.0 E.U./dL   Green Top (Gel)   Result Value Ref Range    Extra Tube Hold for add-ons.    Lavender Top   Result Value Ref Range    Extra Tube hold for add-on    Gold Top - SST   Result Value Ref Range    Extra Tube Hold for add-ons.    Light Blue Top   Result Value Ref Range    Extra Tube hold for add-on          Assessment & Plan       Diagnoses and all orders for this visit:    1. Non-recurrent abdominal hernia without obstruction or gangrene, unspecified hernia type (Primary)  -     Ambulatory Referral to General Surgery      Return in  about 3 months (around 8/23/2022) for Medicare Wellness.          There are no Patient Instructions on file for this visit.     Yobany Nolasco MD    Assessment & Plan

## 2022-07-12 ENCOUNTER — TELEPHONE (OUTPATIENT)
Dept: SURGERY | Facility: CLINIC | Age: 87
End: 2022-07-12

## 2022-07-12 NOTE — TELEPHONE ENCOUNTER
Called patient to reschedule no show appointment on 7/5/2022.  No answer/voicemail not set up.  Sent no show letter.

## 2022-08-22 RX ORDER — MECLIZINE HYDROCHLORIDE 25 MG/1
TABLET ORAL
Qty: 30 TABLET | Refills: 12 | Status: SHIPPED | OUTPATIENT
Start: 2022-08-22

## 2022-11-10 DIAGNOSIS — A79.9 RICKETTSIOSES: ICD-10-CM

## 2022-11-10 DIAGNOSIS — M10.079 IDIOPATHIC GOUT OF FOOT, UNSPECIFIED CHRONICITY, UNSPECIFIED LATERALITY: ICD-10-CM

## 2022-11-10 DIAGNOSIS — R53.83 FATIGUE, UNSPECIFIED TYPE: ICD-10-CM

## 2022-11-10 DIAGNOSIS — R97.20 ELEVATED PSA: ICD-10-CM

## 2022-11-10 DIAGNOSIS — R53.1 WEAKNESS GENERALIZED: ICD-10-CM

## 2022-11-10 DIAGNOSIS — R42 VERTIGO: ICD-10-CM

## 2022-11-11 RX ORDER — ALLOPURINOL 100 MG/1
TABLET ORAL
Qty: 90 TABLET | Refills: 3 | Status: SHIPPED | OUTPATIENT
Start: 2022-11-11

## 2022-11-11 NOTE — TELEPHONE ENCOUNTER
Rx Refill Note  Requested Prescriptions     Pending Prescriptions Disp Refills   • allopurinol (ZYLOPRIM) 100 MG tablet [Pharmacy Med Name: ALLOPURINOL 100 MG  Tablet] 90 tablet 3     Sig: TAKE ONE TABLET BY MOUTH EVERY DAY      Last office visit with prescribing clinician: 5/23/2022      Next office visit with prescribing clinician: Visit date not found            Edith Fleming LPN  11/11/22, 15:04 EST

## 2023-01-05 DIAGNOSIS — I10 ESSENTIAL HYPERTENSION: ICD-10-CM

## 2023-01-05 RX ORDER — CARVEDILOL 3.12 MG/1
TABLET ORAL
Qty: 60 TABLET | Refills: 11 | Status: SHIPPED | OUTPATIENT
Start: 2023-01-05

## 2023-01-09 NOTE — TELEPHONE ENCOUNTER
Tried calling patient numerous times, has a restriction on who calls patient and would not let me leave a message for patient.  Sent patient a letter in the mail for patient to call back to schedule a follow up.

## 2023-04-24 DIAGNOSIS — I10 ESSENTIAL HYPERTENSION: ICD-10-CM

## 2023-04-24 RX ORDER — AMLODIPINE BESYLATE 10 MG/1
TABLET ORAL
Qty: 90 TABLET | Refills: 5 | Status: SHIPPED | OUTPATIENT
Start: 2023-04-24

## 2023-04-24 NOTE — TELEPHONE ENCOUNTER
Rx Refill Note  Requested Prescriptions     Pending Prescriptions Disp Refills   • amLODIPine (NORVASC) 10 MG tablet [Pharmacy Med Name: AMLODIPINE 10 MG TAB 10 Tablet] 90 tablet 5     Sig: TAKE ONE TABLET BY MOUTH EVERY DAY      Last office visit with prescribing clinician: 5/23/2022   Last telemedicine visit with prescribing clinician: Visit date not found   Next office visit with prescribing clinician: Visit date not found   Couldn't get a hold of pt- vm not set up

## 2023-04-25 ENCOUNTER — TELEPHONE (OUTPATIENT)
Dept: INTERNAL MEDICINE | Facility: CLINIC | Age: 88
End: 2023-04-25
Payer: MEDICARE

## 2023-07-25 RX ORDER — MULTIVITAMIN WITH IRON
1 TABLET ORAL DAILY
Qty: 90 TABLET | Refills: 3 | Status: SHIPPED | OUTPATIENT
Start: 2023-07-25 | End: 2024-07-24

## 2023-09-11 ENCOUNTER — OFFICE VISIT (OUTPATIENT)
Dept: INTERNAL MEDICINE | Facility: CLINIC | Age: 88
End: 2023-09-11
Payer: MEDICARE

## 2023-09-11 VITALS
DIASTOLIC BLOOD PRESSURE: 58 MMHG | BODY MASS INDEX: 22.75 KG/M2 | WEIGHT: 162.5 LBS | SYSTOLIC BLOOD PRESSURE: 124 MMHG | OXYGEN SATURATION: 98 % | TEMPERATURE: 96.8 F | HEIGHT: 71 IN | RESPIRATION RATE: 16 BRPM | HEART RATE: 72 BPM

## 2023-09-11 DIAGNOSIS — Z00.00 ROUTINE GENERAL MEDICAL EXAMINATION AT A HEALTH CARE FACILITY: Primary | ICD-10-CM

## 2023-09-11 DIAGNOSIS — R41.3 MEMORY LOSS: ICD-10-CM

## 2023-09-11 DIAGNOSIS — I10 ESSENTIAL HYPERTENSION: ICD-10-CM

## 2023-09-11 PROCEDURE — 99397 PER PM REEVAL EST PAT 65+ YR: CPT | Performed by: INTERNAL MEDICINE

## 2023-09-11 PROCEDURE — 96160 PT-FOCUSED HLTH RISK ASSMT: CPT | Performed by: INTERNAL MEDICINE

## 2023-09-11 PROCEDURE — 1170F FXNL STATUS ASSESSED: CPT | Performed by: INTERNAL MEDICINE

## 2023-09-11 PROCEDURE — 99213 OFFICE O/P EST LOW 20 MIN: CPT | Performed by: INTERNAL MEDICINE

## 2023-09-11 PROCEDURE — G0439 PPPS, SUBSEQ VISIT: HCPCS | Performed by: INTERNAL MEDICINE

## 2023-09-11 RX ORDER — AMLODIPINE BESYLATE 10 MG/1
10 TABLET ORAL DAILY
Qty: 90 TABLET | Refills: 3 | Status: SHIPPED | OUTPATIENT
Start: 2023-09-11

## 2023-09-11 RX ORDER — CARVEDILOL 3.12 MG/1
3.12 TABLET ORAL 2 TIMES DAILY WITH MEALS
Qty: 180 TABLET | Refills: 3 | Status: SHIPPED | OUTPATIENT
Start: 2023-09-11

## 2023-09-11 RX ORDER — DONEPEZIL HYDROCHLORIDE 5 MG/1
5 TABLET, FILM COATED ORAL NIGHTLY
Qty: 30 TABLET | Refills: 11 | Status: SHIPPED | OUTPATIENT
Start: 2023-09-11

## 2023-09-11 NOTE — PROGRESS NOTES
The ABCs of the Annual Wellness Visit  Subsequent Medicare Wellness Visit    Subjective      Sukumar Cosby is a 89 y.o. male who presents for a Subsequent Medicare Wellness Visit.    The following portions of the patient's history were reviewed and   updated as appropriate: allergies, current medications, past family history, past medical history, past social history, past surgical history, and problem list.    Compared to one year ago, the patient feels his physical   health is the same.    Compared to one year ago, the patient feels his mental   health is the same.    Recent Hospitalizations:  He was not admitted to the hospital during the last year.       Current Medical Providers:  Patient Care Team:  Yobany Nolasco MD as PCP - General (Internal Medicine)  Doris Cox MD as Surgeon (General Surgery)    Outpatient Medications Prior to Visit   Medication Sig Dispense Refill    B Complex-C (B-complex with vitamin C) tablet Take 1 tablet by mouth Daily. 90 tablet 3    amLODIPine (NORVASC) 10 MG tablet TAKE ONE TABLET BY MOUTH EVERY DAY 90 tablet 5    carvedilol (COREG) 3.125 MG tablet TAKE ONE TABLET BY MOUTH TWICE DAILY WITH MEALS 60 tablet 11     No facility-administered medications prior to visit.       No opioid medication identified on active medication list. I have reviewed chart for other potential  high risk medication/s and harmful drug interactions in the elderly.        Aspirin is not on active medication list.  Aspirin use is not indicated based on review of current medical condition/s. Risk of harm outweighs potential benefits.  .    Patient Active Problem List   Diagnosis    Arthritis    Ataxic gait    BMI 30.0-30.9,adult    BPPV (benign paroxysmal positional vertigo)    Chronic pain    Chronic kidney disease    Depression    Enlarged prostate without lower urinary tract symptoms (luts)    GERD without esophagitis    Gout    Hypertension    Hypogonadism in male    Lumbar radiculopathy     "Peripheral neuropathy    Restless legs syndrome    Spinal stenosis    Ventral hernia    Polymyositis    Prostate CA     Advance Care Planning   Advance Care Planning     Advance Directive is not on file.  ACP discussion was held with the patient during this visit. Patient does not have an advance directive, information provided.     Objective    Vitals:    09/11/23 1409   BP: 124/58   Pulse: 72   Resp: 16   Temp: 96.8 °F (36 °C)   SpO2: 98%   Weight: 73.7 kg (162 lb 8 oz)   Height: 180.3 cm (71\")   PainSc: 0-No pain     Estimated body mass index is 22.66 kg/m² as calculated from the following:    Height as of this encounter: 180.3 cm (71\").    Weight as of this encounter: 73.7 kg (162 lb 8 oz).  General Appearance:    Alert, cooperative, no distress, appears stated age   Head:    Normocephalic, without obvious abnormality, atraumatic   Eyes:    PERRL, conjunctiva/corneas clear, EOM's intact   Ears:    Normal TM's and external ear canals, both ears   Nose:   Nares normal, septum midline, mucosa normal, no drainage   or sinus tenderness   Throat:   Lips, mucosa, and tongue normal; teeth and gums normal   Neck:   Supple, symmetrical, trachea midline, no adenopathy;        thyroid:  No enlargement/tenderness/nodules; no carotid    bruit or JVD   Back:     Symmetric, no curvature, ROM normal, no CVA tenderness   Lungs:     Clear to auscultation bilaterally, respirations unlabored   Chest wall:    No tenderness or deformity   Heart:    Regular rate and rhythm, S1 and S2 normal, no murmur,        rub or gallop   Abdomen:     Soft, non-tender, bowel sounds active all four quadrants,     no masses, no organomegaly   Extremities:   Extremities normal, atraumatic, no cyanosis or edema   Pulses:   2+ and symmetric all extremities   Skin:   Skin color, texture, turgor normal, no rashes or lesions   Lymph nodes:   Cervical, supraclavicular, and axillary nodes normal   Neurologic:   CNII-XII intact. Normal strength, sensation and " reflexes       throughout     BMI is within normal parameters. No other follow-up for BMI required.      Does the patient have evidence of cognitive impairment?   Yes: Patient advised to make follow up appointment with primary care provider for more detailed examination.    Lab Results   Component Value Date    CHLPL 203 (H) 2023    TRIG 83 2023    HDL 67 (H) 2023     (H) 2023    VLDL 15 2023    HGBA1C 6.00 (H) 2023          HEALTH RISK ASSESSMENT    Smoking Status:  Social History     Tobacco Use   Smoking Status Never   Smokeless Tobacco Never     Alcohol Consumption:  Social History     Substance and Sexual Activity   Alcohol Use No     Fall Risk Screen:    STEADI Fall Risk Assessment was completed, and patient is at LOW risk for falls.Assessment completed on:2023    Depression Screenin/21/2023    11:49 AM   PHQ-2/PHQ-9 Depression Screening   Little Interest or Pleasure in Doing Things 0-->not at all   Feeling Down, Depressed or Hopeless 0-->not at all   PHQ-9: Brief Depression Severity Measure Score 0       Health Habits and Functional and Cognitive Screenin/11/2023     2:00 PM   Functional & Cognitive Status   Do you have difficulty preparing food and eating? No   Do you have difficulty bathing yourself, getting dressed or grooming yourself? No   Do you have difficulty using the toilet? No   Do you have difficulty moving around from place to place? No   Do you have trouble with steps or getting out of a bed or a chair? No   Current Diet Well Balanced Diet   Dental Exam Up to date   Eye Exam Up to date   Exercise (times per week) 0 times per week   Current Exercises Include Walking   Do you need help using the phone?  No   Are you deaf or do you have serious difficulty hearing?  No   Do you need help to go to places out of walking distance? No   Do you need help shopping? No   Do you need help preparing meals?  No   Do you need help with  housework?  No   Do you need help with laundry? No   Do you need help taking your medications? No   Do you need help managing money? No   Do you ever drive or ride in a car without wearing a seat belt? No   Have you felt unusual stress, anger or loneliness in the last month? No   Who do you live with? Alone   If you need help, do you have trouble finding someone available to you? No   Have you been bothered in the last four weeks by sexual problems? No   Do you have difficulty concentrating, remembering or making decisions? No       Age-appropriate Screening Schedule:  Refer to the list below for future screening recommendations based on patient's age, sex and/or medical conditions. Orders for these recommended tests are listed in the plan section. The patient has been provided with a written plan.    Health Maintenance   Topic Date Due    TDAP/TD VACCINES (1 - Tdap) Never done    Pneumococcal Vaccine 65+ (2 - PCV) 10/29/2019    ANNUAL WELLNESS VISIT  07/23/2022    ZOSTER VACCINE (1 of 2) 09/11/2023 (Originally 2/13/1984)    COVID-19 Vaccine (5 - Moderna series) 12/01/2023 (Originally 2/6/2023)    INFLUENZA VACCINE  10/01/2023    LIPID PANEL  07/21/2024                  CMS Preventative Services Quick Reference  Risk Factors Identified During Encounter:    Fall Risk-High or Moderate: Discussed Fall Prevention in the home  Inactivity/Sedentary: Patient was advised to exercise at least 150 minutes a week per CDC recommendations.    The above risks/problems have been discussed with the patient.  Pertinent information has been shared with the patient in the After Visit Summary.    Diagnoses and all orders for this visit:    1. Routine general medical examination at a health care facility (Primary)    2. Essential hypertension  -     amLODIPine (NORVASC) 10 MG tablet; Take 1 tablet by mouth Daily.  Dispense: 90 tablet; Refill: 3  -     carvedilol (COREG) 3.125 MG tablet; Take 1 tablet by mouth 2 (Two) Times a Day With  "Meals.  Dispense: 180 tablet; Refill: 3    3. Memory loss    Other orders  -     donepezil (Aricept) 5 MG tablet; Take 1 tablet by mouth Every Night.  Dispense: 30 tablet; Refill: 11        Follow Up:   Next Medicare Wellness visit to be scheduled in 1 year.      An After Visit Summary and PPPS were made available to the patient.    Pt has been advised to not drive. He tries not to.   Pt unclear on year, pt unable to month and date.  Knows the even to 9/11 happened today and doesn't know the date.  Will try to start aricept and revisit.     Subjective     Patient ID: Sukumar Cosby is a 89 y.o. male. Patient is here for management of multiple medical problems.     Chief Complaint   Patient presents with    Hypertension     History of Present Illness     The following portions of the patient's history were reviewed and updated as appropriate: allergies, current medications, past family history, past medical history, past social history, past surgical history and problem list.    Review of Systems    Current Outpatient Medications:     amLODIPine (NORVASC) 10 MG tablet, Take 1 tablet by mouth Daily., Disp: 90 tablet, Rfl: 3    B Complex-C (B-complex with vitamin C) tablet, Take 1 tablet by mouth Daily., Disp: 90 tablet, Rfl: 3    carvedilol (COREG) 3.125 MG tablet, Take 1 tablet by mouth 2 (Two) Times a Day With Meals., Disp: 180 tablet, Rfl: 3    donepezil (Aricept) 5 MG tablet, Take 1 tablet by mouth Every Night., Disp: 30 tablet, Rfl: 11    Objective      Blood pressure 124/58, pulse 72, temperature 96.8 °F (36 °C), resp. rate 16, height 180.3 cm (71\"), weight 73.7 kg (162 lb 8 oz), SpO2 98 %.    Physical Exam     General Appearance:    Alert, cooperative, no distress, appears stated age   Head:    Normocephalic, without obvious abnormality, atraumatic   Eyes:    PERRL, conjunctiva/corneas clear, EOM's intact   Ears:    Normal TM's and external ear canals, both ears   Nose:   Nares normal, septum midline, mucosa normal, " no drainage   or sinus tenderness   Throat:   Lips, mucosa, and tongue normal; teeth and gums normal   Neck:   Supple, symmetrical, trachea midline, no adenopathy;        thyroid:  No enlargement/tenderness/nodules; no carotid    bruit or JVD   Back:     Symmetric, no curvature, ROM normal, no CVA tenderness   Lungs:     Clear to auscultation bilaterally, respirations unlabored   Chest wall:    No tenderness or deformity   Heart:    Regular rate and rhythm, S1 and S2 normal, no murmur,        rub or gallop   Abdomen:     Soft, non-tender, bowel sounds active all four quadrants,     no masses, no organomegaly   Extremities:   Extremities normal, atraumatic, no cyanosis or edema   Pulses:   2+ and symmetric all extremities   Skin:   Skin color, texture, turgor normal, no rashes or lesions   Lymph nodes:   Cervical, supraclavicular, and axillary nodes normal   Neurologic:   CNII-XII intact. Normal strength, sensation and reflexes       throughout      Results for orders placed or performed in visit on 07/21/23   Lyme Disease, PCR - , Arm, Right    Specimen: Arm, Right   Result Value Ref Range    Lyme Disease(B.burgdorferi)PCR Negative Negative   T4, Free    Specimen: Blood   Result Value Ref Range    Free T4 1.37 0.93 - 1.70 ng/dL   TSH    Specimen: Blood   Result Value Ref Range    TSH 1.530 0.270 - 4.200 uIU/mL   Vitamin B12    Specimen: Blood   Result Value Ref Range    Vitamin B-12 374 211 - 946 pg/mL   Comprehensive Metabolic Panel    Specimen: Blood   Result Value Ref Range    Glucose 100 (H) 65 - 99 mg/dL    BUN 16 8 - 23 mg/dL    Creatinine 1.29 (H) 0.76 - 1.27 mg/dL    EGFR Result 53.0 (L) >60.0 mL/min/1.73    BUN/Creatinine Ratio 12.4 7.0 - 25.0    Sodium 140 136 - 145 mmol/L    Potassium 4.3 3.5 - 5.2 mmol/L    Chloride 100 98 - 107 mmol/L    Total CO2 28.2 22.0 - 29.0 mmol/L    Calcium 9.9 8.6 - 10.5 mg/dL    Total Protein 7.1 6.0 - 8.5 g/dL    Albumin 4.7 3.5 - 5.2 g/dL    Globulin 2.4 gm/dL    A/G Ratio  2.0 g/dL    Total Bilirubin 0.4 0.0 - 1.2 mg/dL    Alkaline Phosphatase 109 39 - 117 U/L    AST (SGOT) 16 1 - 40 U/L    ALT (SGPT) 11 1 - 41 U/L   Hemoglobin A1c    Specimen: Blood   Result Value Ref Range    Hemoglobin A1C 6.00 (H) 4.80 - 5.60 %   Vitamin B6    Specimen: Blood   Result Value Ref Range    Vitamin B6 9.3 3.4 - 65.2 ug/L   [place] Spotted Fever, IgM    Specimen: Blood   Result Value Ref Range    RMSF IgM 0.43 0.00 - 0.89 index   [facility] Spotted Fever, IgG    Specimen: Blood   Result Value Ref Range    RMSF IgG Negative Negative   Ehrlichia Antibody Panel    Specimen: Blood   Result Value Ref Range    E. chaffeensis ([facility]) IgG Titer Negative Neg:<1:64    E. chaffeensis ([facility]) IgM Titer Negative Neg:<1:20    [facility] IgG Titer Negative Neg:<1:64    [facility] IgM Titer Negative Neg:<1:20    RESULT COMMENT: Comment    Lipid Panel    Specimen: Blood   Result Value Ref Range    Total Cholesterol 203 (H) 0 - 200 mg/dL    Triglycerides 83 0 - 150 mg/dL    HDL Cholesterol 67 (H) 40 - 60 mg/dL    VLDL Cholesterol Sadiq 15 5 - 40 mg/dL    LDL Chol Calc (NIH) 121 (H) 0 - 100 mg/dL   CBC & Differential    Specimen: Blood   Result Value Ref Range    WBC 10.10 3.40 - 10.80 10*3/mm3    RBC 4.57 4.14 - 5.80 10*6/mm3    Hemoglobin 12.9 (L) 13.0 - 17.7 g/dL    Hematocrit 38.2 37.5 - 51.0 %    MCV 83.6 79.0 - 97.0 fL    MCH 28.2 26.6 - 33.0 pg    MCHC 33.8 31.5 - 35.7 g/dL    RDW 14.2 12.3 - 15.4 %    Platelets 234 140 - 450 10*3/mm3    Neutrophil Rel % 76.9 (H) 42.7 - 76.0 %    Lymphocyte Rel % 13.1 (L) 19.6 - 45.3 %    Monocyte Rel % 7.9 5.0 - 12.0 %    Eosinophil Rel % 1.3 0.3 - 6.2 %    Basophil Rel % 0.4 0.0 - 1.5 %    Neutrophils Absolute 7.77 (H) 1.70 - 7.00 10*3/mm3    Lymphocytes Absolute 1.32 0.70 - 3.10 10*3/mm3    Monocytes Absolute 0.80 0.10 - 0.90 10*3/mm3    Eosinophils Absolute 0.13 0.00 - 0.40 10*3/mm3    Basophils Absolute 0.04 0.00 - 0.20 10*3/mm3    Immature Granulocyte Rel % 0.4 0.0 - 0.5 %    Immature  Grans Absolute 0.04 0.00 - 0.05 10*3/mm3    nRBC 0.0 0.0 - 0.2 /100 WBC         Assessment & Plan       Diagnoses and all orders for this visit:    1. Routine general medical examination at a health care facility (Primary)    2. Essential hypertension  -     amLODIPine (NORVASC) 10 MG tablet; Take 1 tablet by mouth Daily.  Dispense: 90 tablet; Refill: 3  -     carvedilol (COREG) 3.125 MG tablet; Take 1 tablet by mouth 2 (Two) Times a Day With Meals.  Dispense: 180 tablet; Refill: 3    3. Memory loss    Other orders  -     donepezil (Aricept) 5 MG tablet; Take 1 tablet by mouth Every Night.  Dispense: 30 tablet; Refill: 11    Wt loss.  Needed but it quite a bit.      Return in about 6 months (around 3/11/2024).          There are no Patient Instructions on file for this visit.     Yobany Nolasco MD    Assessment & Plan

## 2023-10-05 RX ORDER — MECLIZINE HYDROCHLORIDE 25 MG/1
TABLET ORAL
Qty: 270 TABLET | OUTPATIENT
Start: 2023-10-05

## 2023-10-20 NOTE — PATIENT INSTRUCTIONS
Medication refill information reviewed.     Due date for fentaNYL (DURAGESIC) 25 mcg/hr 72 hr patch And oxyCODONE (ROXICODONE) 5 MG/5ML solution is 10/30/23     Prescriptions prepped for review.     Will route to provider.          Stop Carvedilol.

## 2024-01-03 ENCOUNTER — TELEPHONE (OUTPATIENT)
Dept: INTERNAL MEDICINE | Facility: CLINIC | Age: 89
End: 2024-01-03

## 2024-01-03 ENCOUNTER — TELEPHONE (OUTPATIENT)
Dept: INTERNAL MEDICINE | Facility: CLINIC | Age: 89
End: 2024-01-03
Payer: MEDICARE

## 2024-01-03 RX ORDER — DONEPEZIL HYDROCHLORIDE 5 MG/1
5 TABLET, FILM COATED ORAL
Qty: 90 TABLET | Refills: 3 | Status: SHIPPED | OUTPATIENT
Start: 2024-01-03

## 2024-01-03 NOTE — TELEPHONE ENCOUNTER
Caller: [id number] - [place], [place] - [address] - [phone]  - [phone] FX    Relationship: Pharmacy    Best call back number: [phone]    What form or medical record are you requesting: ACTIVE MED LIST    Who is requesting this form or medical record from you: PHARMACY    How would you like to receive the form or medical records (pick-up, mail, fax): [phone]      Timeframe paperwork needed: ASAP    Additional notes: PHARMACY IS GETTING MEDICATIONS READY FOR PATIENT; REQUESTING ACTIVE MEDICATION LIST SENT TO ABOVE FAX     PLEASE ADVISE

## 2024-01-03 NOTE — TELEPHONE ENCOUNTER
Caller: SHERWIN DIAMOND    Relationship: Emergency Contact    Best call back number:  [phone]     What form or medical record are you requesting: PATIENTS PRESCRIPTION LIST     Who is requesting this form or medical record from you: DR HUA    How would you like to receive the form or medical records (pick-up, mail, fax):       Timeframe paperwork needed: PLEASE CALL WHEN THE LIST IS READY TO BE PICKED UP

## 2024-05-13 RX ORDER — DONEPEZIL HYDROCHLORIDE 5 MG/1
5 TABLET, FILM COATED ORAL NIGHTLY
Qty: 90 TABLET | Refills: 3 | Status: SHIPPED | OUTPATIENT
Start: 2024-05-13

## 2024-08-09 DIAGNOSIS — I10 ESSENTIAL HYPERTENSION: ICD-10-CM

## 2024-08-09 RX ORDER — CARVEDILOL 3.12 MG/1
3.12 TABLET ORAL 2 TIMES DAILY WITH MEALS
Qty: 60 TABLET | Refills: 11 | Status: SHIPPED | OUTPATIENT
Start: 2024-08-09

## 2024-08-09 NOTE — TELEPHONE ENCOUNTER
Rx Refill Note  Requested Prescriptions     Pending Prescriptions Disp Refills    carvedilol (COREG) 3.125 MG tablet [Pharmacy Med Name: CARVEDILOL 3.125MG TAB 3.125 Tablet] 60 tablet 11     Sig: TAKE ONE TABLET BY MOUTH TWICE DAILY WITH MEALS      Last office visit with prescribing clinician: 9/11/2023   Last telemedicine visit with prescribing clinician: Visit date not found   Next office visit with prescribing clinician: Visit date not found                         Would you like a call back once the refill request has been completed: [] Yes [] No    If the office needs to give you a call back, can they leave a voicemail: [] Yes [] No    Khushbu Bah MA  08/09/24, 11:16 EDT

## 2024-08-14 ENCOUNTER — HOSPITAL ENCOUNTER (OUTPATIENT)
Facility: HOSPITAL | Age: 89
LOS: 1 days | Discharge: HOME | End: 2024-08-15
Attending: EMERGENCY MEDICINE
Payer: MEDICARE

## 2024-08-14 ENCOUNTER — APPOINTMENT (OUTPATIENT)
Dept: CT IMAGING | Facility: HOSPITAL | Age: 89
End: 2024-08-14
Payer: MEDICARE

## 2024-08-14 DIAGNOSIS — I10 ESSENTIAL HYPERTENSION: ICD-10-CM

## 2024-08-14 DIAGNOSIS — R62.7 FAILURE TO THRIVE IN ADULT: Primary | ICD-10-CM

## 2024-08-14 DIAGNOSIS — F03.B0 MODERATE DEMENTIA, UNSPECIFIED DEMENTIA TYPE, UNSPECIFIED WHETHER BEHAVIORAL, PSYCHOTIC, OR MOOD DISTURBANCE OR ANXIETY: ICD-10-CM

## 2024-08-14 PROBLEM — F03.90 DEMENTIA: Status: ACTIVE | Noted: 2024-08-14

## 2024-08-14 LAB
ALBUMIN SERPL-MCNC: 4.3 G/DL (ref 3.5–5.2)
ALBUMIN/GLOB SERPL: 1.4 G/DL
ALP SERPL-CCNC: 94 U/L (ref 39–117)
ALT SERPL W P-5'-P-CCNC: 22 U/L (ref 1–41)
AMPHET+METHAMPHET UR QL: NEGATIVE
AMPHETAMINES UR QL: NEGATIVE
ANION GAP SERPL CALCULATED.3IONS-SCNC: 8.1 MMOL/L (ref 5–15)
APAP SERPL-MCNC: <5 MCG/ML (ref 0–30)
AST SERPL-CCNC: 30 U/L (ref 1–40)
BACTERIA UR QL AUTO: ABNORMAL /HPF
BARBITURATES UR QL SCN: NEGATIVE
BASOPHILS # BLD AUTO: 0.04 10*3/MM3 (ref 0–0.2)
BASOPHILS NFR BLD AUTO: 0.5 % (ref 0–1.5)
BENZODIAZ UR QL SCN: NEGATIVE
BILIRUB SERPL-MCNC: 0.6 MG/DL (ref 0–1.2)
BILIRUB UR QL STRIP: NEGATIVE
BUN SERPL-MCNC: 21 MG/DL (ref 8–23)
BUN/CREAT SERPL: 17.2 (ref 7–25)
BUPRENORPHINE SERPL-MCNC: NEGATIVE NG/ML
CALCIUM SPEC-SCNC: 9.5 MG/DL (ref 8.2–9.6)
CANNABINOIDS SERPL QL: NEGATIVE
CHLORIDE SERPL-SCNC: 99 MMOL/L (ref 98–107)
CK SERPL-CCNC: 197 U/L (ref 20–200)
CLARITY UR: CLEAR
CO2 SERPL-SCNC: 30.9 MMOL/L (ref 22–29)
COCAINE UR QL: NEGATIVE
COLOR UR: ABNORMAL
CREAT SERPL-MCNC: 1.22 MG/DL (ref 0.76–1.27)
DEPRECATED RDW RBC AUTO: 44.8 FL (ref 37–54)
EGFRCR SERPLBLD CKD-EPI 2021: 56.3 ML/MIN/1.73
EOSINOPHIL # BLD AUTO: 0.18 10*3/MM3 (ref 0–0.4)
EOSINOPHIL NFR BLD AUTO: 2.5 % (ref 0.3–6.2)
ERYTHROCYTE [DISTWIDTH] IN BLOOD BY AUTOMATED COUNT: 14.3 % (ref 12.3–15.4)
ETHANOL BLD-MCNC: <10 MG/DL (ref 0–10)
ETHANOL UR QL: <0.01 %
FENTANYL UR-MCNC: NEGATIVE NG/ML
GLOBULIN UR ELPH-MCNC: 3 GM/DL
GLUCOSE SERPL-MCNC: 112 MG/DL (ref 65–99)
GLUCOSE UR STRIP-MCNC: NEGATIVE MG/DL
HCT VFR BLD AUTO: 37 % (ref 37.5–51)
HGB BLD-MCNC: 12.8 G/DL (ref 13–17.7)
HGB UR QL STRIP.AUTO: NEGATIVE
HYALINE CASTS UR QL AUTO: ABNORMAL /LPF
IMM GRANULOCYTES # BLD AUTO: 0.02 10*3/MM3 (ref 0–0.05)
IMM GRANULOCYTES NFR BLD AUTO: 0.3 % (ref 0–0.5)
KETONES UR QL STRIP: NEGATIVE
LEUKOCYTE ESTERASE UR QL STRIP.AUTO: ABNORMAL
LYMPHOCYTES # BLD AUTO: 1.35 10*3/MM3 (ref 0.7–3.1)
LYMPHOCYTES NFR BLD AUTO: 18.4 % (ref 19.6–45.3)
MAGNESIUM SERPL-MCNC: 1.9 MG/DL (ref 1.6–2.4)
MCH RBC QN AUTO: 29.6 PG (ref 26.6–33)
MCHC RBC AUTO-ENTMCNC: 34.6 G/DL (ref 31.5–35.7)
MCV RBC AUTO: 85.6 FL (ref 79–97)
METHADONE UR QL SCN: NEGATIVE
MONOCYTES # BLD AUTO: 0.59 10*3/MM3 (ref 0.1–0.9)
MONOCYTES NFR BLD AUTO: 8 % (ref 5–12)
NEUTROPHILS NFR BLD AUTO: 5.16 10*3/MM3 (ref 1.7–7)
NEUTROPHILS NFR BLD AUTO: 70.3 % (ref 42.7–76)
NITRITE UR QL STRIP: NEGATIVE
NRBC BLD AUTO-RTO: 0 /100 WBC (ref 0–0.2)
OPIATES UR QL: NEGATIVE
OXYCODONE UR QL SCN: NEGATIVE
PCP UR QL SCN: NEGATIVE
PH UR STRIP.AUTO: 5.5 [PH] (ref 5–8)
PHOSPHATE SERPL-MCNC: 3.8 MG/DL (ref 2.5–4.5)
PLATELET # BLD AUTO: 226 10*3/MM3 (ref 140–450)
PMV BLD AUTO: 10.1 FL (ref 6–12)
POTASSIUM SERPL-SCNC: 4.4 MMOL/L (ref 3.5–5.2)
PROT SERPL-MCNC: 7.3 G/DL (ref 6–8.5)
PROT UR QL STRIP: ABNORMAL
RBC # BLD AUTO: 4.32 10*6/MM3 (ref 4.14–5.8)
RBC # UR STRIP: ABNORMAL /HPF
REF LAB TEST METHOD: ABNORMAL
SALICYLATES SERPL-MCNC: <0.3 MG/DL
SODIUM SERPL-SCNC: 138 MMOL/L (ref 136–145)
SP GR UR STRIP: 1.01 (ref 1–1.03)
SQUAMOUS #/AREA URNS HPF: ABNORMAL /HPF
TRICYCLICS UR QL SCN: NEGATIVE
TSH SERPL DL<=0.05 MIU/L-ACNC: 1.2 UIU/ML (ref 0.27–4.2)
UROBILINOGEN UR QL STRIP: ABNORMAL
WBC # UR STRIP: ABNORMAL /HPF
WBC NRBC COR # BLD AUTO: 7.34 10*3/MM3 (ref 3.4–10.8)

## 2024-08-14 PROCEDURE — 93005 ELECTROCARDIOGRAM TRACING: CPT | Performed by: EMERGENCY MEDICINE

## 2024-08-14 PROCEDURE — 99285 EMERGENCY DEPT VISIT HI MDM: CPT

## 2024-08-14 PROCEDURE — 25810000003 SODIUM CHLORIDE 0.9 % SOLUTION: Performed by: STUDENT IN AN ORGANIZED HEALTH CARE EDUCATION/TRAINING PROGRAM

## 2024-08-14 PROCEDURE — 82077 ASSAY SPEC XCP UR&BREATH IA: CPT | Performed by: EMERGENCY MEDICINE

## 2024-08-14 PROCEDURE — 81001 URINALYSIS AUTO W/SCOPE: CPT | Performed by: EMERGENCY MEDICINE

## 2024-08-14 PROCEDURE — 80053 COMPREHEN METABOLIC PANEL: CPT | Performed by: EMERGENCY MEDICINE

## 2024-08-14 PROCEDURE — 80179 DRUG ASSAY SALICYLATE: CPT | Performed by: EMERGENCY MEDICINE

## 2024-08-14 PROCEDURE — 84443 ASSAY THYROID STIM HORMONE: CPT | Performed by: EMERGENCY MEDICINE

## 2024-08-14 PROCEDURE — 82550 ASSAY OF CK (CPK): CPT | Performed by: EMERGENCY MEDICINE

## 2024-08-14 PROCEDURE — 96372 THER/PROPH/DIAG INJ SC/IM: CPT

## 2024-08-14 PROCEDURE — 83735 ASSAY OF MAGNESIUM: CPT | Performed by: EMERGENCY MEDICINE

## 2024-08-14 PROCEDURE — 96361 HYDRATE IV INFUSION ADD-ON: CPT

## 2024-08-14 PROCEDURE — 96360 HYDRATION IV INFUSION INIT: CPT

## 2024-08-14 PROCEDURE — 80307 DRUG TEST PRSMV CHEM ANLYZR: CPT | Performed by: EMERGENCY MEDICINE

## 2024-08-14 PROCEDURE — 70450 CT HEAD/BRAIN W/O DYE: CPT

## 2024-08-14 PROCEDURE — 84100 ASSAY OF PHOSPHORUS: CPT | Performed by: EMERGENCY MEDICINE

## 2024-08-14 PROCEDURE — 99223 1ST HOSP IP/OBS HIGH 75: CPT | Performed by: STUDENT IN AN ORGANIZED HEALTH CARE EDUCATION/TRAINING PROGRAM

## 2024-08-14 PROCEDURE — 25010000002 HEPARIN (PORCINE) PER 1000 UNITS: Performed by: STUDENT IN AN ORGANIZED HEALTH CARE EDUCATION/TRAINING PROGRAM

## 2024-08-14 PROCEDURE — 80143 DRUG ASSAY ACETAMINOPHEN: CPT | Performed by: EMERGENCY MEDICINE

## 2024-08-14 PROCEDURE — 85025 COMPLETE CBC W/AUTO DIFF WBC: CPT | Performed by: EMERGENCY MEDICINE

## 2024-08-14 RX ORDER — ONDANSETRON 2 MG/ML
4 INJECTION INTRAMUSCULAR; INTRAVENOUS EVERY 6 HOURS PRN
Status: DISCONTINUED | OUTPATIENT
Start: 2024-08-14 | End: 2024-08-15 | Stop reason: HOSPADM

## 2024-08-14 RX ORDER — QUETIAPINE FUMARATE 25 MG/1
25 TABLET, FILM COATED ORAL NIGHTLY
Status: DISCONTINUED | OUTPATIENT
Start: 2024-08-14 | End: 2024-08-15 | Stop reason: HOSPADM

## 2024-08-14 RX ORDER — SODIUM CHLORIDE 9 MG/ML
40 INJECTION, SOLUTION INTRAVENOUS AS NEEDED
Status: DISCONTINUED | OUTPATIENT
Start: 2024-08-14 | End: 2024-08-15 | Stop reason: HOSPADM

## 2024-08-14 RX ORDER — NITROGLYCERIN 0.4 MG/1
0.4 TABLET SUBLINGUAL
Status: DISCONTINUED | OUTPATIENT
Start: 2024-08-14 | End: 2024-08-15 | Stop reason: HOSPADM

## 2024-08-14 RX ORDER — AMLODIPINE BESYLATE 5 MG/1
10 TABLET ORAL DAILY
Status: DISCONTINUED | OUTPATIENT
Start: 2024-08-14 | End: 2024-08-15 | Stop reason: HOSPADM

## 2024-08-14 RX ORDER — CARVEDILOL 3.12 MG/1
3.12 TABLET ORAL 2 TIMES DAILY WITH MEALS
Status: DISCONTINUED | OUTPATIENT
Start: 2024-08-14 | End: 2024-08-15 | Stop reason: HOSPADM

## 2024-08-14 RX ORDER — HEPARIN SODIUM 5000 [USP'U]/ML
5000 INJECTION, SOLUTION INTRAVENOUS; SUBCUTANEOUS EVERY 12 HOURS SCHEDULED
Status: DISCONTINUED | OUTPATIENT
Start: 2024-08-14 | End: 2024-08-15 | Stop reason: HOSPADM

## 2024-08-14 RX ORDER — DONEPEZIL HYDROCHLORIDE 5 MG/1
5 TABLET, FILM COATED ORAL NIGHTLY
Status: DISCONTINUED | OUTPATIENT
Start: 2024-08-14 | End: 2024-08-15 | Stop reason: HOSPADM

## 2024-08-14 RX ORDER — SODIUM CHLORIDE 0.9 % (FLUSH) 0.9 %
10 SYRINGE (ML) INJECTION EVERY 12 HOURS SCHEDULED
Status: DISCONTINUED | OUTPATIENT
Start: 2024-08-14 | End: 2024-08-15 | Stop reason: HOSPADM

## 2024-08-14 RX ORDER — SODIUM CHLORIDE 9 MG/ML
100 INJECTION, SOLUTION INTRAVENOUS CONTINUOUS
Status: DISCONTINUED | OUTPATIENT
Start: 2024-08-14 | End: 2024-08-15 | Stop reason: HOSPADM

## 2024-08-14 RX ORDER — SODIUM CHLORIDE 0.9 % (FLUSH) 0.9 %
10 SYRINGE (ML) INJECTION AS NEEDED
Status: DISCONTINUED | OUTPATIENT
Start: 2024-08-14 | End: 2024-08-15 | Stop reason: HOSPADM

## 2024-08-14 RX ADMIN — AMLODIPINE BESYLATE 10 MG: 5 TABLET ORAL at 18:40

## 2024-08-14 RX ADMIN — CARVEDILOL 3.12 MG: 3.12 TABLET, FILM COATED ORAL at 18:41

## 2024-08-14 RX ADMIN — QUETIAPINE FUMARATE 25 MG: 25 TABLET ORAL at 20:34

## 2024-08-14 RX ADMIN — HEPARIN SODIUM 5000 UNITS: 5000 INJECTION INTRAVENOUS; SUBCUTANEOUS at 20:34

## 2024-08-14 RX ADMIN — Medication 10 ML: at 20:36

## 2024-08-14 RX ADMIN — DONEPEZIL HYDROCHLORIDE 5 MG: 5 TABLET, FILM COATED ORAL at 20:34

## 2024-08-14 RX ADMIN — SODIUM CHLORIDE 100 ML/HR: 9 INJECTION, SOLUTION INTRAVENOUS at 18:41

## 2024-08-14 NOTE — H&P
[facility] [place]   HISTORY AND PHYSICAL      Name:  Sukumar Cosby   Age:  89 y.o.  Sex:  male  :  1934  MRN:  [id number]   Visit Number:  [id number]  Admission Date:  2024  Date Of Service:  24  Primary Care Physician:  Yobany Nolasco MD    Chief Complaint:     Dementia, unable to care for himself at home    History Of Presenting Illness:      Sukumar Cosby is a 89-year-old man with past medical history of dementia, hypertension, GERD, anemia, fibromyalgia, prostate cancer, depression, arthritis.  Presented to [facility] ED on 2024 after patient had called the 's office to say that someone was breaking into his home.  Upon arrival patient was not sure that he was the one that even called the 's office, they reportedly found rotting food, not taking his medicines, medications.  Patient oriented to self and location only.  Not oriented to situation.  He denied any concerning symptoms.    ED summary: Afebrile, vital signs stable on room air.  EKG sinus rhythm, right bundle branch block, nonspecific ST-T wave changes.  Creatinine kinase not elevated.  Creatinine 1.22, GFR 56, TSH 1.2, no leukocytosis, hemoglobin 12.8.  Acetaminophen not elevated, salicylate not elevated.  Leukocytosis with squamous cells may be contaminant.  UDS negative.  Ethanol negative.  CT head no acute intracranial process, atrophy and chronic ischemic white matter changes present.  APS report filed.  He did reportedly urinate on the floor in the ED.    Review Of Systems:    All systems were reviewed and negative except as mentioned in history of presenting illness, assessment and plan.    Past Medical History: Patient  has a past medical history of Arthritis, Colon polyps, Depression, Fluid in knee, Frequent UTI, Gallstones, GERD (gastroesophageal reflux disease), History of anemia, History of blood transfusion, History of bronchitis, History of colonic polyps, History of fibromyalgia, History of  "gallstones, Hypertension, Prostate cancer, and Sinusitis.    Past Surgical History: Patient  has a past surgical history that includes Cholecystectomy (2005); Colonoscopy (2013); and Upper gastrointestinal endoscopy (2013).    Social History: Patient  reports that he has never smoked. He has never used smokeless tobacco. He reports that he does not drink alcohol and does not use drugs.    Family History:  Patient's family history has been reviewed and found to be noncontributory.     Allergies:      Patient has no known allergies.    Home Medications:    Prior to Admission Medications       Prescriptions Last Dose Informant Patient Reported? Taking?    amLODIPine (NORVASC) 10 MG tablet   No No    Take 1 tablet by mouth Daily.    carvedilol (COREG) 3.125 MG tablet   No No    TAKE ONE TABLET BY MOUTH TWICE DAILY WITH MEALS    donepezil (ARICEPT) 5 MG tablet   No No    TAKE 1 TABLET EVERY NIGHT          ED Medications:    Medications   sodium chloride 0.9 % flush 10 mL (has no administration in time range)     Vital Signs:  Temp:  [98.1 °F (36.7 °C)] 98.1 °F (36.7 °C)  Heart Rate:  [68-71] 71  Resp:  [18-23] 23  BP: (142-173)/(74-98) 173/98        08/14/24  1302   Weight: 73 kg (160 lb 15 oz)     Body mass index is 22.45 kg/m².    Physical Exam:     Most recent vital Signs: /98   Pulse 71   Temp 98.1 °F (36.7 °C) (Oral)   Resp 23   Ht 180.3 cm (71\")   Wt 73 kg (160 lb 15 oz)   SpO2 96%   BMI 22.45 kg/m²     Physical Exam  Constitutional:       General: He is not in acute distress.     Appearance: He is not ill-appearing or toxic-appearing.      Comments: Disheveled   HENT:      Mouth/Throat:      Mouth: Mucous membranes are moist.      Comments: Poor dentition  Eyes:      Extraocular Movements: Extraocular movements intact.   Cardiovascular:      Rate and Rhythm: Normal rate and regular rhythm.      Pulses: Normal pulses.      Heart sounds: Normal heart sounds.   Pulmonary:      Effort: Pulmonary effort is " normal.      Breath sounds: Normal breath sounds.   Abdominal:      Palpations: Abdomen is soft.      Tenderness: There is no abdominal tenderness.   Musculoskeletal:      Right lower leg: No edema.      Left lower leg: Edema present.   Skin:     General: Skin is warm.      Capillary Refill: Capillary refill takes less than 2 seconds.   Neurological:      General: No focal deficit present.      Mental Status: He is alert and oriented to person, place, and time.   Psychiatric:         Mood and Affect: Mood normal.         Thought Content: Thought content normal.         Laboratory data:    I have reviewed the labs done in the emergency room.    Results from last 7 days   Lab Units 08/14/24  1326   SODIUM mmol/L 138   POTASSIUM mmol/L 4.4   CHLORIDE mmol/L 99   CO2 mmol/L 30.9*   BUN mg/dL 21   CREATININE mg/dL 1.22   CALCIUM mg/dL 9.5   BILIRUBIN mg/dL 0.6   ALK PHOS U/L 94   ALT (SGPT) U/L 22   AST (SGOT) U/L 30   GLUCOSE mg/dL 112*     Results from last 7 days   Lab Units 08/14/24  1326   WBC 10*3/mm3 7.34   HEMOGLOBIN g/dL 12.8*   HEMATOCRIT % 37.0*   PLATELETS 10*3/mm3 226         Results from last 7 days   Lab Units 08/14/24  1326   CK TOTAL U/L 197                     Results from last 7 days   Lab Units 08/14/24  1444   COLOR UA  Dark Yellow*   GLUCOSE UA  Negative   KETONES UA  Negative   BLOOD UA  Negative   LEUKOCYTES UA  Moderate (2+)*   PH, URINE  5.5   BILIRUBIN UA  Negative   UROBILINOGEN UA  0.2 E.U./dL   RBC UA /HPF None Seen   WBC UA /HPF 6-10*       Pain Management Panel          Latest Ref Rng & Units 8/14/2024   Pain Management Panel   Amphetamine, Urine Qual Negative Negative    Barbiturates Screen, Urine Negative Negative    Benzodiazepine Screen, Urine Negative Negative    Buprenorphine, Screen, Urine Negative Negative    Cocaine Screen, Urine Negative Negative    Fentanyl, Urine Negative Negative    Methadone Screen , Urine Negative Negative    Methamphetamine, Ur Negative Negative        Details                   EKG:      EKG sinus rhythm, right bundle branch block, nonspecific ST-T wave changes.     Radiology:    CT Head Without Contrast    Result Date: 8/14/2024  PROCEDURE: CT HEAD WO CONTRAST-  HISTORY: AMS  COMPARISON: 4/30/2022  TECHNIQUE: Noncontrast exam  FINDINGS: Moderate atrophy and chronic ischemic white matter changes are noted.  No cortical edema is present. There is no mass or hemorrhage. Ventricles are normal.  Bone windows show no skull fracture or obvious destructive lesion.      1. No acute intracranial abnormality or obvious mass. 2. Atrophy and chronic ischemic white matter changes as above.   This study was performed with techniques to keep radiation doses as low as reasonably achievable (ALARA). Individualized dose reduction techniques using automated exposure control or adjustment of vA and/or kV according to the patient size were employed.    This report was signed and finalized on 8/14/2024 2:37 PM by Rob Newman MD.       Assessment/Plan:    Inpatient general floor admission 8/14/2024 with advancing dementia, unable to care for himself at home.    At time of admission patient comfortable in bed, pleasantly demented.  Oriented to self and place, not year or overall situation.    Updated Sister Ashwini on the phone.    Dementia  Unsafe living conditions  Social work consultation.  Patient will need long-term care, if not able to discharge with family.  APS report has been filed.  Seroquel 25 mg nightly.      Chronic: dementia, hypertension, GERD, anemia, fibromyalgia, prostate cancer, depression, arthritis.  Continue home medications.      Risk Assessment: High  DVT Prophylaxis: Heparin  Code Status: DNR/DNI  Diet: Cardiac    Advance Care Planning   ACP discussion was held with the patient during this visit. Patient does not have an advance directive, information provided.           Brian Joseph Kerley, DO  08/14/24  16:25 EDT    Dictated utilizing Dragon dictation.

## 2024-08-14 NOTE — CASE MANAGEMENT/SOCIAL WORK
Case Management/Social Work    Patient Name:  Sukumar Cosby  YOB: 1934  MRN: [id number]  Admit Date:  2024        APS report made due to AMS and failure to thrive. Possible self-neglect. Web Id # [id number]. DOMINGO has already spoke with APS worker Michele regarding case. DOMINGO following.     14:09 EDT SW spoke with pt at bedside. Pt appeared to be unkept on assessment. Pt had holes in clothing and had a strong odor. Pt believes he has people breaking into his home and building behind his house. Pt reported being hungry to this SW. He states he drives himself to the store and is able to cook for himself and does his own cleaning. Pt reports working on cars in his free time. Pt reports having no family local and that his wife passed away over 10 years ago. Pt states his sister Ashwini  a few years ago who lived in [place]. SW spoke with APS who states his sister Ashwini is still living and is currently on her way from [place]. APS states the report was accepted and Michele/ROLANDA is currently on her way to hospital at this time to assess patient. DOMINGO following. DOMINGO updated team via secure chat.     15:22 EDT APS currently at bedside to assess pt.     16:15 EDT APS worker asked us to keep pt overnight and see how he does. She currently does not have a safe dc plan for him right now and her supervisor is in a meeting. She will need to investigate further and go see the home and speak with family. She will let this SW know once she has a plan and will call back. Pt can maybe go home with sister tomorrow if all looks appropriate. DOMINGO updated team via secure chat. DOMINGO following.     Electronically signed by:  IMER Archibald  24 13:49 EDT

## 2024-08-14 NOTE — PLAN OF CARE
Goal Outcome Evaluation:  Plan of Care Reviewed With: patient        Progress: no change  Outcome Evaluation: New admit

## 2024-08-14 NOTE — ED PROVIDER NOTES
EMERGENCY DEPARTMENT ENCOUNTER    Pt Name: Sukumar Cosby  MRN: [id number]  Pt :   1934  Room Number:  426/1  Date of encounter:  2024  PCP: Yobany Nolasco MD  ED Provider: Noah Metzger MD    Historian: Patient and EMS      HPI:  Chief Complaint   Patient presents with    Altered Mental Status     Pt lives at home by himself, he called the  office saying someone broke in to his house. U/A SO found rotting food, full medicine bottles and bad living condition plus pt is confused. Pt has no complaints.           Context: Sukumar Cosby is a 89 y.o. male who presents to the ED c/o altered mental status, failure to thrive.  The patient apparently called the 's office and said that some was breaking to his house.  On arrival the patient is not sure that he was the one that called the 's office.  According to EMS the patient was found with rotting food, not taking his medicines, hoarding type living conditions.  The patient denies any complaints, not alert and oriented to anything but self      PAST MEDICAL HISTORY  Past Medical History:   Diagnosis Date    Arthritis     Colon polyps     Depression     Fluid in knee     Frequent UTI     Gallstones     GERD (gastroesophageal reflux disease)     History of anemia     History of blood transfusion     History of bronchitis     History of colonic polyps     History of fibromyalgia     History of gallstones     Hypertension     Prostate cancer     Sinusitis          PAST SURGICAL HISTORY  Past Surgical History:   Procedure Laterality Date    CHOLECYSTECTOMY  2005    COLONOSCOPY  2013    UPPER GASTROINTESTINAL ENDOSCOPY  2013         FAMILY HISTORY  Family History   Problem Relation Age of Onset    Cancer Mother     Migraines Mother     Osteoporosis Mother          SOCIAL HISTORY  Social History     Socioeconomic History    Marital status:    Tobacco Use    Smoking status: Never    Smokeless tobacco: Never   Vaping Use    Vaping  status: Never Used   Substance and Sexual Activity    Alcohol use: No    Drug use: No    Sexual activity: Defer         ALLERGIES  Patient has no known allergies.        REVIEW OF SYSTEMS  Review of Systems   Unable to perform ROS: Mental status change        All systems reviewed and negative except for those discussed in HPI.       PHYSICAL EXAM    I have reviewed the triage vital signs and nursing notes.    ED Triage Vitals   Temp Heart Rate Resp BP SpO2   08/14/24 1302 08/14/24 1302 08/14/24 1302 08/14/24 1309 08/14/24 1302   98.1 °F (36.7 °C) 68 18 142/74 94 %      Temp src Heart Rate Source Patient Position BP Location FiO2 (%)   08/14/24 1302 08/14/24 1302 08/14/24 1309 08/14/24 1309 --   Oral Monitor Lying Left arm        Physical Exam  Constitutional:       Appearance: Normal appearance. He is not ill-appearing.      Comments: Disheveled appearing elderly male, no acute respiratory distress   HENT:      Head: Normocephalic and atraumatic.      Right Ear: External ear normal.      Left Ear: External ear normal.      Nose: Nose normal.      Mouth/Throat:      Mouth: Mucous membranes are moist.      Pharynx: Oropharynx is clear.   Eyes:      Extraocular Movements: Extraocular movements intact.      Conjunctiva/sclera: Conjunctivae normal.      Pupils: Pupils are equal, round, and reactive to light.   Cardiovascular:      Rate and Rhythm: Normal rate and regular rhythm.      Pulses:           Radial pulses are 2+ on the right side and 2+ on the left side.      Heart sounds: No murmur heard.  Pulmonary:      Effort: Pulmonary effort is normal.      Breath sounds: Normal breath sounds.   Abdominal:      General: There is no distension.      Tenderness: There is no abdominal tenderness. There is no guarding.   Musculoskeletal:         General: No swelling or deformity.      Cervical back: Normal range of motion and neck supple.   Skin:     General: Skin is warm and dry.      Capillary Refill: Capillary refill takes  less than 2 seconds.      Findings: No rash.   Neurological:      General: No focal deficit present.      Mental Status: He is alert. He is confused.      GCS: GCS eye subscore is 4. GCS verbal subscore is 5. GCS motor subscore is 6.      Cranial Nerves: Cranial nerves 2-12 are intact.      Sensory: Sensation is intact.      Motor: Motor function is intact.      Coordination: Coordination is intact.            LAB RESULTS  Recent Results (from the past 24 hour(s))   Comprehensive Metabolic Panel    Collection Time: 08/14/24  1:26 PM    Specimen: Blood   Result Value Ref Range    Glucose 112 (H) 65 - 99 mg/dL    BUN 21 8 - 23 mg/dL    Creatinine 1.22 0.76 - 1.27 mg/dL    Sodium 138 136 - 145 mmol/L    Potassium 4.4 3.5 - 5.2 mmol/L    Chloride 99 98 - 107 mmol/L    CO2 30.9 (H) 22.0 - 29.0 mmol/L    Calcium 9.5 8.2 - 9.6 mg/dL    Total Protein 7.3 6.0 - 8.5 g/dL    Albumin 4.3 3.5 - 5.2 g/dL    ALT (SGPT) 22 1 - 41 U/L    AST (SGOT) 30 1 - 40 U/L    Alkaline Phosphatase 94 39 - 117 U/L    Total Bilirubin 0.6 0.0 - 1.2 mg/dL    Globulin 3.0 gm/dL    A/G Ratio 1.4 g/dL    BUN/Creatinine Ratio 17.2 7.0 - 25.0    Anion Gap 8.1 5.0 - 15.0 mmol/L    eGFR 56.3 (L) >60.0 mL/min/1.73   Acetaminophen Level    Collection Time: 08/14/24  1:26 PM    Specimen: Blood   Result Value Ref Range    Acetaminophen <5.0 0.0 - 30.0 mcg/mL   Ethanol    Collection Time: 08/14/24  1:26 PM    Specimen: Blood   Result Value Ref Range    Ethanol <10 0 - 10 mg/dL    Ethanol % <0.010 %   Salicylate Level    Collection Time: 08/14/24  1:26 PM    Specimen: Blood   Result Value Ref Range    Salicylate <0.3 <=30.0 mg/dL   CK    Collection Time: 08/14/24  1:26 PM    Specimen: Blood   Result Value Ref Range    Creatine Kinase 197 20 - 200 U/L   Magnesium    Collection Time: 08/14/24  1:26 PM    Specimen: Blood   Result Value Ref Range    Magnesium 1.9 1.6 - 2.4 mg/dL   Phosphorus    Collection Time: 08/14/24  1:26 PM    Specimen: Blood   Result Value  Ref Range    Phosphorus 3.8 2.5 - 4.5 mg/dL   TSH    Collection Time: 08/14/24  1:26 PM    Specimen: Blood   Result Value Ref Range    TSH 1.200 0.270 - 4.200 uIU/mL   CBC Auto Differential    Collection Time: 08/14/24  1:26 PM    Specimen: Blood   Result Value Ref Range    WBC 7.34 3.40 - 10.80 10*3/mm3    RBC 4.32 4.14 - 5.80 10*6/mm3    Hemoglobin 12.8 (L) 13.0 - 17.7 g/dL    Hematocrit 37.0 (L) 37.5 - 51.0 %    MCV 85.6 79.0 - 97.0 fL    MCH 29.6 26.6 - 33.0 pg    MCHC 34.6 31.5 - 35.7 g/dL    RDW 14.3 12.3 - 15.4 %    RDW-SD 44.8 37.0 - 54.0 fl    MPV 10.1 6.0 - 12.0 fL    Platelets 226 140 - 450 10*3/mm3    Neutrophil % 70.3 42.7 - 76.0 %    Lymphocyte % 18.4 (L) 19.6 - 45.3 %    Monocyte % 8.0 5.0 - 12.0 %    Eosinophil % 2.5 0.3 - 6.2 %    Basophil % 0.5 0.0 - 1.5 %    Immature Grans % 0.3 0.0 - 0.5 %    Neutrophils, Absolute 5.16 1.70 - 7.00 10*3/mm3    Lymphocytes, Absolute 1.35 0.70 - 3.10 10*3/mm3    Monocytes, Absolute 0.59 0.10 - 0.90 10*3/mm3    Eosinophils, Absolute 0.18 0.00 - 0.40 10*3/mm3    Basophils, Absolute 0.04 0.00 - 0.20 10*3/mm3    Immature Grans, Absolute 0.02 0.00 - 0.05 10*3/mm3    nRBC 0.0 0.0 - 0.2 /100 WBC   Urinalysis With Microscopic If Indicated (No Culture) - Urine, Clean Catch    Collection Time: 08/14/24  2:44 PM    Specimen: Urine, Clean Catch   Result Value Ref Range    Color, UA Dark Yellow (A) Yellow, Straw    Appearance, UA Clear Clear    pH, UA 5.5 5.0 - 8.0    Specific Gravity, UA 1.013 1.005 - 1.030    Glucose, UA Negative Negative    Ketones, UA Negative Negative    Bilirubin, UA Negative Negative    Blood, UA Negative Negative    Protein, UA 30 mg/dL (1+) (A) Negative    Leuk Esterase, UA Moderate (2+) (A) Negative    Nitrite, UA Negative Negative    Urobilinogen, UA 0.2 E.U./dL 0.2 - 1.0 E.U./dL   Urine Drug Screen - Urine, Clean Catch    Collection Time: 08/14/24  2:44 PM    Specimen: Urine, Clean Catch   Result Value Ref Range    THC, Screen, Urine Negative  Negative    Phencyclidine (PCP), Urine Negative Negative    Cocaine Screen, Urine Negative Negative    Methamphetamine, Ur Negative Negative    Opiate Screen Negative Negative    Amphetamine Screen, Urine Negative Negative    Benzodiazepine Screen, Urine Negative Negative    Tricyclic Antidepressants Screen Negative Negative    Methadone Screen, Urine Negative Negative    Barbiturates Screen, Urine Negative Negative    Oxycodone Screen, Urine Negative Negative    Buprenorphine, Screen, Urine Negative Negative   Fentanyl, Urine - Urine, Clean Catch    Collection Time: 08/14/24  2:44 PM    Specimen: Urine, Clean Catch   Result Value Ref Range    Fentanyl, Urine Negative Negative   Urinalysis, Microscopic Only - Urine, Clean Catch    Collection Time: 08/14/24  2:44 PM    Specimen: Urine, Clean Catch   Result Value Ref Range    RBC, UA None Seen None Seen, 0-2 /HPF    WBC, UA 6-10 (A) None Seen, 0-2 /HPF    Bacteria, UA Trace (A) None Seen /HPF    Squamous Epithelial Cells, UA 3-6 (A) None Seen, 0-2 /HPF    Hyaline Casts, UA 0-2 None Seen /LPF    Methodology Manual Light Microscopy        If labs were ordered, I independently reviewed the results and considered them in treating the patient.        RADIOLOGY  CT Head Without Contrast    Result Date: 8/14/2024  PROCEDURE: CT HEAD WO CONTRAST-  HISTORY: [facility]  COMPARISON: 4/30/2022  TECHNIQUE: Noncontrast exam  FINDINGS: Moderate atrophy and chronic ischemic white matter changes are noted.  No cortical edema is present. There is no mass or hemorrhage. Ventricles are normal.  Bone windows show no skull fracture or obvious destructive lesion.      1. No acute intracranial abnormality or obvious mass. 2. Atrophy and chronic ischemic white matter changes as above.   This study was performed with techniques to keep radiation doses as low as reasonably achievable (ALARA). Individualized dose reduction techniques using automated exposure control or adjustment of vA and/or kV  according to the patient size were employed.    This report was signed and finalized on 8/14/2024 2:37 PM by Rob Newman MD.           PROCEDURES    Procedures    Interpretations    O2 Sat: The patients oxygen saturation was 94% on Room Air.  This was independently interpreted by me as Normal    EKG: I reviewed and independently interpreted the EKG as sinus rhythm at 68 bpm, normal axis, normal intervals, no ST elevation, no T wave inversions    Cardiac Monitoring: I reviewed and independently interpreted the Rhythm Strip as Normal Sinus rhythm rate of 68    Radiology: I ordered and independently reviewed the above noted radiographic studies.  I viewed images of CT Head which showed No intracranial hemorrhage per my independent interpretation. See radiologist's dictation for official interpretation.         MEDICATIONS GIVEN IN ER    Medications   sodium chloride 0.9 % flush 10 mL (has no administration in time range)   donepezil (ARICEPT) tablet 5 mg (5 mg Oral Given 8/14/24 2034)   amLODIPine (NORVASC) tablet 10 mg (10 mg Oral Given 8/14/24 1840)   carvedilol (COREG) tablet 3.125 mg (3.125 mg Oral Given 8/14/24 1841)   sodium chloride 0.9 % flush 10 mL (10 mL Intravenous Given 8/14/24 2036)   sodium chloride 0.9 % flush 10 mL (has no administration in time range)   sodium chloride 0.9 % infusion 40 mL (has no administration in time range)   ondansetron (ZOFRAN) injection 4 mg (has no administration in time range)   heparin (porcine) 5000 UNIT/ML injection 5,000 Units (5,000 Units Subcutaneous Given 8/14/24 2034)   nitroglycerin (NITROSTAT) SL tablet 0.4 mg (has no administration in time range)   sodium chloride 0.9 % infusion (100 mL/hr Intravenous New Bag 8/14/24 1841)   Potassium Replacement - Follow Nurse / BPA Driven Protocol (has no administration in time range)   Magnesium Standard Dose Replacement - Follow Nurse / BPA Driven Protocol (has no administration in time range)   Phosphorus Replacement - Follow  Nurse / BPA Driven Protocol (has no administration in time range)   Calcium Replacement - Follow Nurse / BPA Driven Protocol (has no administration in time range)   QUEtiapine (SEROquel) tablet 25 mg (25 mg Oral Given 8/14/24 2034)         MEDICAL DECISION MAKING, PROGRESS, and CONSULTS    All labs, if obtained, have been independently reviewed by me.  All radiology studies, if obtained, have been reviewed by me and the radiologist dictating the report.  All EKG's, if obtained, have been independently viewed and interpreted by me      Discussion below represents my analysis of pertinent findings related to patient's condition, differential diagnosis, treatment plan and final disposition.      Differential diagnosis:    89-year-old male presented ED with complaint of altered mental status, is quite disheveled appearing, according to EMS he apparently was found in a fairly squalor type environment, concern for electrolyte abnormalities, possible infectious process given the patient does not appear to be taking care of himself, versus simply worsening dementia.  Will obtain labs, CT of the head, consult social work for possible APS consult    Additional Sources:  Discussed/ obtained information from independent historians:  EMS  External (non-ED) record review:  Primary care note 7/26/2024 regarding memory problems   Chronic or social conditions impacting care:  Dementia      Orders placed during this visit:  Orders Placed This Encounter   Procedures    CT Head Without Contrast    Comprehensive Metabolic Panel    Urinalysis With Microscopic If Indicated (No Culture) - Urine, Clean Catch    Urine Drug Screen - Urine, Clean Catch    Acetaminophen Level    Ethanol    Salicylate Level    CK    Magnesium    Phosphorus    TSH    CBC Auto Differential    Fentanyl, Urine - Urine, Clean Catch    Urinalysis, Microscopic Only - Urine, Clean Catch    Basic Metabolic Panel    CBC Auto Differential    Diet: Cardiac; Healthy Heart  (2-3 Na+); Fluid Consistency: Thin (IDDSI 0)    Vital Signs    Up With Assistance    Intake & Output    Weigh Patient    Oral Care    Maintain IV Access    Telemetry - Place Orders & Notify Provider of Results When Patient Experiences Acute Chest Pain, Dysrhythmia or Respiratory Distress    May Be Off Telemetry for Tests    Notify Provider (With Default Parameters)    Discontinue Cardiac Monitoring    Code Status and Medical Interventions: No CPR (Do Not Attempt to Resuscitate); Limited Support; No intubation (DNI)    Inpatient Case Management  Consult    Inpatient Nutrition Consult    ECG 12 Lead Altered Mental Status    Wound Ostomy Eval & Treat    Insert Peripheral IV    Insert Peripheral IV    Inpatient Admission    CBC & Differential         Additional orders considered but not ordered:  None    ED Course:    Consultants:  Hospitalist and case management, Adult Protective Services    ED Course as of 08/14/24 2043   Wed Aug 14, 2024   1423 Dr Kerley to the  [facility], APS consult has been filed, APS worker on the way to the hospital currently [CS]   1423 CBC & Differential(!)  Hemoglobin at baseline [CS]   1423 Ethanol  Negative alcohol level [CS]   1518 Urine Drug Screen - Urine, Clean Catch  UDS negative [CS]   1624 APS has finished their evaluation, do not feel that they can provide a safe discharge plan for the patient, through consult with case management recommend admission to the hospital.  I did speak directly to Dr Kerley who agrees to evaluate the patient for admission [CS]      ED Course User Index  [CS] Noah Metzger MD           After my consideration of clinical presentation and any laboratory/radiology studies obtained, I discussed the findings with the patient/patient representative who is in agreement with the treatment plan and the final disposition. Risks and benefits of admission was discussed     AS OF 20:43 EDT VITALS:    BP - 133/64  HR -  69  TEMP - 96.3 °F (35.7 °C) (Oral)  O2 SATS - 96%    I reviewed the patients prescription monitoring report if available prior to discharge    DIAGNOSIS  Final diagnoses:   Failure to thrive in adult   Moderate dementia, unspecified dementia type, unspecified whether behavioral, psychotic, or mood disturbance or anxiety         DISPOSITION  ED Disposition       ED Disposition   Decision to Admit    Condition   --    Comment   Level of Care: Med/Surg [1]   Diagnosis: Dementia [180152]   Certification: I Certify That Inpatient Hospital Services Are Medically Necessary For Greater Than 2 Midnights                     Please note that portions of this document were completed with voice recognition software.        Noah Metzger MD  08/14/24 [id number]

## 2024-08-14 NOTE — ED NOTES
At this time I contacted [facility] for our charge RN to verify if pt had spoken with the [facility] department

## 2024-08-15 ENCOUNTER — READMISSION MANAGEMENT (OUTPATIENT)
Dept: CALL CENTER | Facility: HOSPITAL | Age: 89
End: 2024-08-15
Payer: MEDICARE

## 2024-08-15 ENCOUNTER — TELEPHONE (OUTPATIENT)
Dept: INTERNAL MEDICINE | Facility: CLINIC | Age: 89
End: 2024-08-15

## 2024-08-15 VITALS
HEART RATE: 73 BPM | WEIGHT: 174.38 LBS | OXYGEN SATURATION: 90 % | TEMPERATURE: 97.5 F | DIASTOLIC BLOOD PRESSURE: 65 MMHG | HEIGHT: 71 IN | BODY MASS INDEX: 24.41 KG/M2 | SYSTOLIC BLOOD PRESSURE: 153 MMHG | RESPIRATION RATE: 18 BRPM

## 2024-08-15 LAB
ANION GAP SERPL CALCULATED.3IONS-SCNC: 8.2 MMOL/L (ref 5–15)
BASOPHILS # BLD AUTO: 0.03 10*3/MM3 (ref 0–0.2)
BASOPHILS NFR BLD AUTO: 0.5 % (ref 0–1.5)
BUN SERPL-MCNC: 18 MG/DL (ref 8–23)
BUN/CREAT SERPL: 20.9 (ref 7–25)
CALCIUM SPEC-SCNC: 9.1 MG/DL (ref 8.2–9.6)
CHLORIDE SERPL-SCNC: 103 MMOL/L (ref 98–107)
CO2 SERPL-SCNC: 27.8 MMOL/L (ref 22–29)
CREAT SERPL-MCNC: 0.86 MG/DL (ref 0.76–1.27)
DEPRECATED RDW RBC AUTO: 43.6 FL (ref 37–54)
EGFRCR SERPLBLD CKD-EPI 2021: 82.3 ML/MIN/1.73
EOSINOPHIL # BLD AUTO: 0.22 10*3/MM3 (ref 0–0.4)
EOSINOPHIL NFR BLD AUTO: 3.9 % (ref 0.3–6.2)
ERYTHROCYTE [DISTWIDTH] IN BLOOD BY AUTOMATED COUNT: 14 % (ref 12.3–15.4)
GLUCOSE SERPL-MCNC: 97 MG/DL (ref 65–99)
HCT VFR BLD AUTO: 35.9 % (ref 37.5–51)
HGB BLD-MCNC: 12 G/DL (ref 13–17.7)
IMM GRANULOCYTES # BLD AUTO: 0.02 10*3/MM3 (ref 0–0.05)
IMM GRANULOCYTES NFR BLD AUTO: 0.4 % (ref 0–0.5)
LYMPHOCYTES # BLD AUTO: 1.29 10*3/MM3 (ref 0.7–3.1)
LYMPHOCYTES NFR BLD AUTO: 22.8 % (ref 19.6–45.3)
MCH RBC QN AUTO: 28.7 PG (ref 26.6–33)
MCHC RBC AUTO-ENTMCNC: 33.4 G/DL (ref 31.5–35.7)
MCV RBC AUTO: 85.9 FL (ref 79–97)
MONOCYTES # BLD AUTO: 0.55 10*3/MM3 (ref 0.1–0.9)
MONOCYTES NFR BLD AUTO: 9.7 % (ref 5–12)
NEUTROPHILS NFR BLD AUTO: 3.56 10*3/MM3 (ref 1.7–7)
NEUTROPHILS NFR BLD AUTO: 62.7 % (ref 42.7–76)
NRBC BLD AUTO-RTO: 0 /100 WBC (ref 0–0.2)
PLATELET # BLD AUTO: 198 10*3/MM3 (ref 140–450)
PMV BLD AUTO: 10 FL (ref 6–12)
POTASSIUM SERPL-SCNC: 4 MMOL/L (ref 3.5–5.2)
RBC # BLD AUTO: 4.18 10*6/MM3 (ref 4.14–5.8)
SODIUM SERPL-SCNC: 139 MMOL/L (ref 136–145)
WBC NRBC COR # BLD AUTO: 5.67 10*3/MM3 (ref 3.4–10.8)

## 2024-08-15 PROCEDURE — 25010000002 HEPARIN (PORCINE) PER 1000 UNITS: Performed by: STUDENT IN AN ORGANIZED HEALTH CARE EDUCATION/TRAINING PROGRAM

## 2024-08-15 PROCEDURE — 96361 HYDRATE IV INFUSION ADD-ON: CPT

## 2024-08-15 PROCEDURE — A9270 NON-COVERED ITEM OR SERVICE: HCPCS | Performed by: STUDENT IN AN ORGANIZED HEALTH CARE EDUCATION/TRAINING PROGRAM

## 2024-08-15 PROCEDURE — 96372 THER/PROPH/DIAG INJ SC/IM: CPT

## 2024-08-15 PROCEDURE — G0378 HOSPITAL OBSERVATION PER HR: HCPCS

## 2024-08-15 PROCEDURE — 99239 HOSP IP/OBS DSCHRG MGMT >30: CPT | Performed by: STUDENT IN AN ORGANIZED HEALTH CARE EDUCATION/TRAINING PROGRAM

## 2024-08-15 PROCEDURE — 80048 BASIC METABOLIC PNL TOTAL CA: CPT | Performed by: STUDENT IN AN ORGANIZED HEALTH CARE EDUCATION/TRAINING PROGRAM

## 2024-08-15 PROCEDURE — 85025 COMPLETE CBC W/AUTO DIFF WBC: CPT | Performed by: STUDENT IN AN ORGANIZED HEALTH CARE EDUCATION/TRAINING PROGRAM

## 2024-08-15 PROCEDURE — 25810000003 SODIUM CHLORIDE 0.9 % SOLUTION: Performed by: STUDENT IN AN ORGANIZED HEALTH CARE EDUCATION/TRAINING PROGRAM

## 2024-08-15 PROCEDURE — 63710000001 CARVEDILOL 3.125 MG TABLET: Performed by: STUDENT IN AN ORGANIZED HEALTH CARE EDUCATION/TRAINING PROGRAM

## 2024-08-15 PROCEDURE — 63710000001 AMLODIPINE 5 MG TABLET: Performed by: STUDENT IN AN ORGANIZED HEALTH CARE EDUCATION/TRAINING PROGRAM

## 2024-08-15 RX ORDER — QUETIAPINE FUMARATE 25 MG/1
25 TABLET, FILM COATED ORAL NIGHTLY
Qty: 30 TABLET | Refills: 0 | Status: SHIPPED | OUTPATIENT
Start: 2024-08-15

## 2024-08-15 RX ORDER — CARVEDILOL 3.12 MG/1
3.12 TABLET ORAL 2 TIMES DAILY WITH MEALS
Qty: 60 TABLET | Refills: 0 | Status: SHIPPED | OUTPATIENT
Start: 2024-08-15

## 2024-08-15 RX ORDER — DONEPEZIL HYDROCHLORIDE 5 MG/1
5 TABLET, FILM COATED ORAL NIGHTLY
Qty: 30 TABLET | Refills: 0 | Status: SHIPPED | OUTPATIENT
Start: 2024-08-15

## 2024-08-15 RX ORDER — AMLODIPINE BESYLATE 10 MG/1
10 TABLET ORAL DAILY
Qty: 30 TABLET | Refills: 0 | Status: SHIPPED | OUTPATIENT
Start: 2024-08-15

## 2024-08-15 RX ADMIN — AMLODIPINE BESYLATE 10 MG: 5 TABLET ORAL at 09:16

## 2024-08-15 RX ADMIN — SODIUM CHLORIDE 100 ML/HR: 9 INJECTION, SOLUTION INTRAVENOUS at 06:57

## 2024-08-15 RX ADMIN — CARVEDILOL 3.12 MG: 3.12 TABLET, FILM COATED ORAL at 09:16

## 2024-08-15 RX ADMIN — Medication 10 ML: at 09:19

## 2024-08-15 RX ADMIN — HEPARIN SODIUM 5000 UNITS: 5000 INJECTION INTRAVENOUS; SUBCUTANEOUS at 09:16

## 2024-08-15 NOTE — PLAN OF CARE
Goal Outcome Evaluation:      Patient meets criteria for discharge at this time.

## 2024-08-15 NOTE — CASE MANAGEMENT/SOCIAL WORK
Discharge Planning Assessment  [facility]     Patient Name: Sukumar Cosby  MRN: [id number]  Today's Date: 8/15/2024    Admit Date: 8/14/2024    Plan: DCP   Discharge Needs Assessment       Row Name 08/15/24 1045       Living Environment    People in Home alone    Current Living Arrangements home    Potentially Unsafe Housing Conditions insects/pests;other (see comments)  police said home is in deplorable conditions    Provides Primary Care For no one, unable/limited ability to care for self    Family Caregiver if Needed sibling(s)    Quality of Family Relationships involved    Able to Return to Prior Arrangements no  APS involved. pt unable to return home and live alone       Resource/Environmental Concerns    Resource/Environmental Concerns environmental    Environment Concerns unsafe neighborhood       Transition Planning    Patient/Family Anticipates Transition to home with family    Transportation Anticipated family or friend will provide       Discharge Needs Assessment    Readmission Within the Last 30 Days no previous admission in last 30 days    Equipment Currently Used at Home none    Concerns to be Addressed home safety;medication    Anticipated Changes Related to Illness inability to care for self    Current Discharge Risk lack of support system/caregiver;lives alone                   Discharge Plan       Row Name 08/15/24 1051       Plan    Plan DCP    Patient/Family in Agreement with Plan yes    Plan Comments SW met with pt at bedside for DCP. Pt lives alone. Pt reports being independent and still driving. Pt is not A&O and confused on assessment. Pt able to tell me his address. Pt states he thinks his neighbors are stealing his things and breaking into his home. Police and APS has been involved with pt. Pts house was in deplorable conditions. Pt states he still drives and is able to feed himself and does laundry. Pt does not use any DME for mobility. Pt is not home O2. Pt unable to confirm PCP. Goal is to  return home with sister Ashwini at [place] and APS will continue to follow pt. APS to meet with sister when she arrives at hospital. Team updated.    Final Discharge Disposition Code 01 - home or self-care                  Continued Care and Services - Admitted Since 8/14/2024    No active coordination exists for this encounter.       Expected Discharge Date and Time       Expected Discharge Date Expected Discharge Time    Aug 15, 2024            Demographic Summary       Row Name 08/15/24 1044       General Information    Admission Type observation    Arrived From home    Required Notices Provided Observation Status Notice    Referral Source admission list    Reason for Consult discharge planning    Preferred Language English                   Functional Status       Row Name 08/15/24 1045       Functional Status, IADL    Medications independent    Meal Preparation independent    Housekeeping independent    Laundry independent    Shopping independent    IADL Comments pt reports being indep and living alone, unsure of what he is able to do at home alone       Mental Status Summary    Recent Changes in Mental Status/Cognitive Functioning unable to assess;memory (recent)                   Psychosocial       Row Name 08/15/24 1045       Developmental Stage (Eriksson's)    Developmental Stage Stage 8 (65 years-death/Late Adulthood) Integrity vs. Despair                   Abuse/Neglect    No documentation.                  Legal    No documentation.                  Substance Abuse    No documentation.                  Patient Forms    No documentation.                     IMER Archibald

## 2024-08-15 NOTE — NURSING NOTE
Seen for wound consult. Pleasant and cooperative with assessment. Inna MENDOZA assisted.   Right great toe toenail missing, orders to paint with betadine daily. Unsure of how toenail disappeared.   Right foot plantar surface has large callus. Dark purple discoloration under the callus. Complains of discomfort with palpation, no surrounding redness or drainage.   Toenails on both feet are long. Would benefit from a podiatry visit after discharge.  Coccyx red blanchable. Encouraged patient to change positions. He did lay on right side while nursing was in the room.   Standing orders for care include a turning schedule, barrier cream twice daily and prn after cleansing, float heels off bed with pillows or heel lift boots, encourage increase mobility as appropriate and tolerated to reduce pressure. Staff to contact provider and re-consult wound nurse for new skin issues or lack of improvement with current orders. Thank you for the consult. If you have questions or concerns do not hesitate to contact me.

## 2024-08-15 NOTE — PLAN OF CARE
Problem: Adult Inpatient Plan of Care  Goal: Plan of Care Review  Outcome: Ongoing, Progressing  Goal: Patient-Specific Goal (Individualized)  Outcome: Ongoing, Progressing  Goal: Absence of Hospital-Acquired Illness or Injury  Outcome: Ongoing, Progressing  Intervention: Identify and Manage Fall Risk  Recent Flowsheet Documentation  Taken 8/15/2024 0000 by Mary Ann Khan RN  Safety Promotion/Fall Prevention:   activity supervised   assistive device/personal items within reach   clutter free environment maintained   safety round/check completed  Taken 8/14/2024 2200 by Mary Ann Khan RN  Safety Promotion/Fall Prevention:   activity supervised   assistive device/personal items within reach   clutter free environment maintained   safety round/check completed  Taken 8/14/2024 2045 by Mary Ann Khan RN  Safety Promotion/Fall Prevention:   activity supervised   assistive device/personal items within reach   clutter free environment maintained   safety round/check completed  Intervention: Prevent Skin Injury  Recent Flowsheet Documentation  Taken 8/15/2024 0000 by Mary Ann Khan RN  Body Position:   position changed independently   side-lying   left  Taken 8/14/2024 2200 by Mary Ann Khan RN  Body Position:   position changed independently   tilted   right  Taken 8/14/2024 2045 by Mary Ann Khan RN  Body Position:   position changed independently   sitting up in bed  Skin Protection:   adhesive use limited   tubing/devices free from skin contact   incontinence pads utilized  Intervention: Prevent and Manage VTE (Venous Thromboembolism) Risk  Recent Flowsheet Documentation  Taken 8/15/2024 0000 by Mary Ann Khan RN  Activity Management: activity minimized  Taken 8/14/2024 2200 by Mary Ann Khan RN  Activity Management: activity minimized  Taken 8/14/2024 2045 by Mary Ann Khan RN  Activity Management: activity encouraged  Intervention: Prevent Infection  Recent Flowsheet  Documentation  Taken 8/15/2024 0000 by Mary Ann Khan RN  Infection Prevention:   environmental surveillance performed   rest/sleep promoted  Taken 8/14/2024 2200 by Mary Ann Khan RN  Infection Prevention:   environmental surveillance performed   rest/sleep promoted  Taken 8/14/2024 2045 by Mary Ann Khan RN  Infection Prevention: environmental surveillance performed  Goal: Optimal Comfort and Wellbeing  Outcome: Ongoing, Progressing  Goal: Readiness for Transition of Care  Outcome: Ongoing, Progressing     Problem: Skin Injury Risk Increased  Goal: Skin Health and Integrity  Outcome: Ongoing, Progressing  Intervention: Optimize Skin Protection  Recent Flowsheet Documentation  Taken 8/15/2024 0000 by Mary Ann Khan RN  Head of Bed (HOB) Positioning: HOB elevated  Taken 8/14/2024 2200 by Mary Ann Khan RN  Head of Bed (HOB) Positioning: HOB elevated  Taken 8/14/2024 2045 by Mary Ann Khan RN  Pressure Reduction Techniques:   frequent weight shift encouraged   heels elevated off bed  Head of Bed (HOB) Positioning: HOB elevated  Pressure Reduction Devices: pressure-redistributing mattress utilized  Skin Protection:   adhesive use limited   tubing/devices free from skin contact   incontinence pads utilized     Problem: Impaired Wound Healing  Goal: Optimal Wound Healing  Outcome: Ongoing, Progressing  Intervention: Promote Wound Healing  Recent Flowsheet Documentation  Taken 8/15/2024 0000 by Mary Ann Khan RN  Activity Management: activity minimized  Taken 8/14/2024 2200 by Mary Ann Khan RN  Activity Management: activity minimized  Taken 8/14/2024 2045 by Mary Ann Khan RN  Activity Management: activity encouraged     Problem: Fall Injury Risk  Goal: Absence of Fall and Fall-Related Injury  Outcome: Ongoing, Progressing  Intervention: Identify and Manage Contributors  Recent Flowsheet Documentation  Taken 8/14/2024 2045 by Mary Ann Khan RN  Medication Review/Management:  medications reviewed  Intervention: Promote Injury-Free Environment  Recent Flowsheet Documentation  Taken 8/15/2024 0000 by Mary Ann Khan RN  Safety Promotion/Fall Prevention:   activity supervised   assistive device/personal items within reach   clutter free environment maintained   safety round/check completed  Taken 8/14/2024 2200 by Mary Ann Khan, RN  Safety Promotion/Fall Prevention:   activity supervised   assistive device/personal items within reach   clutter free environment maintained   safety round/check completed  Taken 8/14/2024 2045 by Mary Ann Khan RN  Safety Promotion/Fall Prevention:   activity supervised   assistive device/personal items within reach   clutter free environment maintained   safety round/check completed   Goal Outcome Evaluation:            Plan of care reviewed with patient. Patient has rested well tonight. No significant events this shift.

## 2024-08-15 NOTE — DISCHARGE INSTRUCTIONS
Paint Right great toe with betadine daily to promote wound healing.   Apply moisture barrier cream after incontinent episodes to prevent skin breakdown.

## 2024-08-15 NOTE — PAYOR COMM NOTE
"TO:HUMANA   FROM:JANUARY SWIFT RN PHONE [phone]  OBSERVATION NOTIFICATION   TAX ID [id number] NPI [id number]    Sukumar Cosby (89 y.o. Male)       Date of Birth   1934    Social Security Number       Address   [address] [place], [place] [address]    Home Phone   [phone]    MRN   [id number]       Cheondoism   Jainism    Marital Status                               Admission Date   24    Admission Type   Emergency    Admitting Provider       Attending Provider   Kerley, Brian Joseph, DO    Department, Room/Bed   [facility] TELEMETRY 6/1       Discharge Date       Discharge Disposition       Discharge Destination                                 Attending Provider: Kerley, Brian Joseph, DO    Allergies: No Known Allergies    Isolation: None   Infection: None   Code Status: No CPR    Ht: 180.3 cm (71\")   Wt: 79.1 kg (174 lb 6.1 oz)    Admission Cmt: None   Principal Problem: Dementia [F03.90]                   Active Insurance as of 2024       Primary Coverage       Payor Plan Insurance Group Employer/Plan Group    HUMANA MEDICARE REPLACEMENT HUMANA MED ADV HMO [id number]       Payor Plan Address Payor Plan Phone Number Payor Plan Fax Number Effective Dates    [address] [address] [phone]  2023 - None Entered    [facility] [id number]         Subscriber Name Subscriber Birth Date Member ID       SUKUMAR COSBY 1934 [id number]                     Emergency Contacts        (Rel.) Home Phone Work Phone Mobile Phone    Ashwini Cormier Case (Relative) [phone] -- --    SEHRWIN DIAMOND (Friend) -- -- [phone]                 History & Physical        Kerley, Brian Joseph, DO at [address] [place] [address] HOSPITALIST   HISTORY AND PHYSICAL      Name:  Sukumar Cosby   Age:  89 y.o.  Sex:  male  :  1934  MRN:  [id number]   Visit Number:  [id number]  Admission Date:  2024  Date Of Service:  24  Primary Care " Physician:  Yobany Nolasco MD    Chief Complaint:     Dementia, unable to care for himself at home    History Of Presenting Illness:      Sukumar Cosby is a 89-year-old man with past medical history of dementia, hypertension, GERD, anemia, fibromyalgia, prostate cancer, depression, arthritis.  Presented to [facility] ED on 8/14/2024 after patient had called the 's office to say that someone was breaking into his home.  Upon arrival patient was not sure that he was the one that even called the 's office, they reportedly found rotting food, not taking his medicines, medications.  Patient oriented to self and location only.  Not oriented to situation.  He denied any concerning symptoms.    ED summary: Afebrile, vital signs stable on room air.  EKG sinus rhythm, right bundle branch block, nonspecific ST-T wave changes.  Creatinine kinase not elevated.  Creatinine 1.22, GFR 56, TSH 1.2, no leukocytosis, hemoglobin 12.8.  Acetaminophen not elevated, salicylate not elevated.  Leukocytosis with squamous cells may be contaminant.  UDS negative.  Ethanol negative.  CT head no acute intracranial process, atrophy and chronic ischemic white matter changes present.  APS report filed.  He did reportedly urinate on the floor in the ED.    Review Of Systems:    All systems were reviewed and negative except as mentioned in history of presenting illness, assessment and plan.    Past Medical History: Patient  has a past medical history of Arthritis, Colon polyps, Depression, Fluid in knee, Frequent UTI, Gallstones, GERD (gastroesophageal reflux disease), History of anemia, History of blood transfusion, History of bronchitis, History of colonic polyps, History of fibromyalgia, History of gallstones, Hypertension, Prostate cancer, and Sinusitis.    Past Surgical History: Patient  has a past surgical history that includes Cholecystectomy (2005); Colonoscopy (2013); and Upper gastrointestinal endoscopy (2013).    Social History:  "Patient  reports that he has never smoked. He has never used smokeless tobacco. He reports that he does not drink alcohol and does not use drugs.    Family History:  Patient's family history has been reviewed and found to be noncontributory.     Allergies:      Patient has no known allergies.    Home Medications:    Prior to Admission Medications       Prescriptions Last Dose Informant Patient Reported? Taking?    amLODIPine (NORVASC) 10 MG tablet   No No    Take 1 tablet by mouth Daily.    carvedilol (COREG) 3.125 MG tablet   No No    TAKE ONE TABLET BY MOUTH TWICE DAILY WITH MEALS    donepezil (ARICEPT) 5 MG tablet   No No    TAKE 1 TABLET EVERY NIGHT          ED Medications:    Medications   sodium chloride 0.9 % flush 10 mL (has no administration in time range)     Vital Signs:  Temp:  [98.1 °F (36.7 °C)] 98.1 °F (36.7 °C)  Heart Rate:  [68-71] 71  Resp:  [18-23] 23  BP: (142-173)/(74-98) 173/98        08/14/24  1302   Weight: 73 kg (160 lb 15 oz)     Body mass index is 22.45 kg/m².    Physical Exam:     Most recent vital Signs: /98   Pulse 71   Temp 98.1 °F (36.7 °C) (Oral)   Resp 23   Ht 180.3 cm (71\")   Wt 73 kg (160 lb 15 oz)   SpO2 96%   BMI 22.45 kg/m²     Physical Exam  Constitutional:       General: He is not in acute distress.     Appearance: He is not ill-appearing or toxic-appearing.      Comments: Disheveled   HENT:      Mouth/Throat:      Mouth: Mucous membranes are moist.      Comments: Poor dentition  Eyes:      Extraocular Movements: Extraocular movements intact.   Cardiovascular:      Rate and Rhythm: Normal rate and regular rhythm.      Pulses: Normal pulses.      Heart sounds: Normal heart sounds.   Pulmonary:      Effort: Pulmonary effort is normal.      Breath sounds: Normal breath sounds.   Abdominal:      Palpations: Abdomen is soft.      Tenderness: There is no abdominal tenderness.   Musculoskeletal:      Right lower leg: No edema.      Left lower leg: Edema present. "   Skin:     General: Skin is warm.      Capillary Refill: Capillary refill takes less than 2 seconds.   Neurological:      General: No focal deficit present.      Mental Status: He is alert and oriented to person, place, and time.   Psychiatric:         Mood and Affect: Mood normal.         Thought Content: Thought content normal.         Laboratory data:    I have reviewed the labs done in the emergency room.    Results from last 7 days   Lab Units 08/14/24  1326   SODIUM mmol/L 138   POTASSIUM mmol/L 4.4   CHLORIDE mmol/L 99   CO2 mmol/L 30.9*   BUN mg/dL 21   CREATININE mg/dL 1.22   CALCIUM mg/dL 9.5   BILIRUBIN mg/dL 0.6   ALK PHOS U/L 94   ALT (SGPT) U/L 22   AST (SGOT) U/L 30   GLUCOSE mg/dL 112*     Results from last 7 days   Lab Units 08/14/24  1326   WBC 10*3/mm3 7.34   HEMOGLOBIN g/dL 12.8*   HEMATOCRIT % 37.0*   PLATELETS 10*3/mm3 226         Results from last 7 days   Lab Units 08/14/24  1326   CK TOTAL U/L 197                     Results from last 7 days   Lab Units 08/14/24  1444   COLOR UA  Dark Yellow*   GLUCOSE UA  Negative   KETONES UA  Negative   BLOOD UA  Negative   LEUKOCYTES UA  Moderate (2+)*   PH, URINE  5.5   BILIRUBIN UA  Negative   UROBILINOGEN UA  0.2 E.U./dL   RBC UA /HPF None Seen   WBC UA /HPF 6-10*       Pain Management Panel          Latest Ref Rng & Units 8/14/2024   Pain Management Panel   Amphetamine, Urine Qual Negative Negative    Barbiturates Screen, Urine Negative Negative    Benzodiazepine Screen, Urine Negative Negative    Buprenorphine, Screen, Urine Negative Negative    Cocaine Screen, Urine Negative Negative    Fentanyl, Urine Negative Negative    Methadone Screen , Urine Negative Negative    Methamphetamine, Ur Negative Negative       Details                   EKG:      EKG sinus rhythm, right bundle branch block, nonspecific ST-T wave changes.     Radiology:    CT Head Without Contrast    Result Date: 8/14/2024  PROCEDURE: CT HEAD WO CONTRAST-  HISTORY: AMS  COMPARISON:  4/30/2022  TECHNIQUE: Noncontrast exam  FINDINGS: Moderate atrophy and chronic ischemic white matter changes are noted.  No cortical edema is present. There is no mass or hemorrhage. Ventricles are normal.  Bone windows show no skull fracture or obvious destructive lesion.      1. No acute intracranial abnormality or obvious mass. 2. Atrophy and chronic ischemic white matter changes as above.   This study was performed with techniques to keep radiation doses as low as reasonably achievable (ALARA). Individualized dose reduction techniques using automated exposure control or adjustment of vA and/or kV according to the patient size were employed.    This report was signed and finalized on 8/14/2024 2:37 PM by Rob Newman MD.       Assessment/Plan:    Inpatient general floor admission 8/14/2024 with advancing dementia, unable to care for himself at home.    At time of admission patient comfortable in bed, pleasantly demented.  Oriented to self and place, not year or overall situation.    Updated Sister Ashwini on the phone.    Dementia  Unsafe living conditions  Social work consultation.  Patient will need long-term care, if not able to discharge with family.  APS report has been filed.  Seroquel 25 mg nightly.      Chronic: dementia, hypertension, GERD, anemia, fibromyalgia, prostate cancer, depression, arthritis.  Continue home medications.      Risk Assessment: High  DVT Prophylaxis: Heparin  Code Status: DNR/DNI  Diet: Cardiac    Advance Care Planning  ACP discussion was held with the patient during this visit. Patient does not have an advance directive, information provided.           Brian Joseph Kerley, DO  08/14/24  16:25 EDT    Dictated utilizing Dragon dictation.    Electronically signed by Kerley, Brian Joseph, DO at 08/14/24 1721          Emergency Department Notes        Basim Elliott at 08/14/24 1656       Summary:Asystole alarm                 RN aware of Asystole alarm on pt. Right arm lead will not  stay on the pt RN at the bedside     Electronically signed by Basim Elliott at 24 1659       She Byrd RN at 24 1651          Report given to 4th floor RN and orderly notified     Electronically signed by She Byrd RN at 24 1652       Basim Elliott at 24 1440          At this time I called tech 1 to have them reattach ecg electrodes     Electronically signed by Basim Elliott at 24 1441       She Byrd RN at 24 1336          Manjeet MENDOZA called Neida  att to report pt condition and to file APS report    Electronically signed by She Byrd RN at 24 1337       Noah Metzger MD at 24 1317           EMERGENCY DEPARTMENT ENCOUNTER    Pt Name: Sukumar Cosby  MRN: [id number]  Pt :   1934  Room Number:  426/1  Date of encounter:  2024  PCP: Yobany Nolasco MD  ED Provider: Noah Metzger MD    Historian: Patient and EMS      HPI:  Chief Complaint   Patient presents with    Altered Mental Status     Pt lives at home by himself, he called the  office saying someone broke in to his house. U/A SO found rotting food, full medicine bottles and bad living condition plus pt is confused. Pt has no complaints.           Context: Sukumar Cosby is a 89 y.o. male who presents to the ED c/o altered mental status, failure to thrive.  The patient apparently called the 's office and said that some was breaking to his house.  On arrival the patient is not sure that he was the one that called the 's office.  According to EMS the patient was found with rotting food, not taking his medicines, hoarding type living conditions.  The patient denies any complaints, not alert and oriented to anything but self      PAST MEDICAL HISTORY  Past Medical History:   Diagnosis Date    Arthritis     Colon polyps     Depression     Fluid in knee     Frequent UTI     Gallstones     GERD (gastroesophageal reflux  disease)     History of anemia     History of blood transfusion     History of bronchitis     History of colonic polyps     History of fibromyalgia     History of gallstones     Hypertension     Prostate cancer     Sinusitis          PAST SURGICAL HISTORY  Past Surgical History:   Procedure Laterality Date    CHOLECYSTECTOMY  2005    COLONOSCOPY  2013    UPPER GASTROINTESTINAL ENDOSCOPY  2013         FAMILY HISTORY  Family History   Problem Relation Age of Onset    Cancer Mother     Migraines Mother     Osteoporosis Mother          SOCIAL HISTORY  Social History     Socioeconomic History    Marital status:    Tobacco Use    Smoking status: Never    Smokeless tobacco: Never   Vaping Use    Vaping status: Never Used   Substance and Sexual Activity    Alcohol use: No    Drug use: No    Sexual activity: Defer         ALLERGIES  Patient has no known allergies.        REVIEW OF SYSTEMS  Review of Systems   Unable to perform ROS: Mental status change        All systems reviewed and negative except for those discussed in HPI.       PHYSICAL EXAM    I have reviewed the triage vital signs and nursing notes.    ED Triage Vitals   Temp Heart Rate Resp BP SpO2   08/14/24 1302 08/14/24 1302 08/14/24 1302 08/14/24 1309 08/14/24 1302   98.1 °F (36.7 °C) 68 18 142/74 94 %      Temp src Heart Rate Source Patient Position BP Location FiO2 (%)   08/14/24 1302 08/14/24 1302 08/14/24 1309 08/14/24 1309 --   Oral Monitor Lying Left arm        Physical Exam  Constitutional:       Appearance: Normal appearance. He is not ill-appearing.      Comments: Disheveled appearing elderly male, no acute respiratory distress   HENT:      Head: Normocephalic and atraumatic.      Right Ear: External ear normal.      Left Ear: External ear normal.      Nose: Nose normal.      Mouth/Throat:      Mouth: Mucous membranes are moist.      Pharynx: Oropharynx is clear.   Eyes:      Extraocular Movements: Extraocular movements intact.       Conjunctiva/sclera: Conjunctivae normal.      Pupils: Pupils are equal, round, and reactive to light.   Cardiovascular:      Rate and Rhythm: Normal rate and regular rhythm.      Pulses:           Radial pulses are 2+ on the right side and 2+ on the left side.      Heart sounds: No murmur heard.  Pulmonary:      Effort: Pulmonary effort is normal.      Breath sounds: Normal breath sounds.   Abdominal:      General: There is no distension.      Tenderness: There is no abdominal tenderness. There is no guarding.   Musculoskeletal:         General: No swelling or deformity.      Cervical back: Normal range of motion and neck supple.   Skin:     General: Skin is warm and dry.      Capillary Refill: Capillary refill takes less than 2 seconds.      Findings: No rash.   Neurological:      General: No focal deficit present.      Mental Status: He is alert. He is confused.      GCS: GCS eye subscore is 4. GCS verbal subscore is 5. GCS motor subscore is 6.      Cranial Nerves: Cranial nerves 2-12 are intact.      Sensory: Sensation is intact.      Motor: Motor function is intact.      Coordination: Coordination is intact.            LAB RESULTS  Recent Results (from the past 24 hour(s))   Comprehensive Metabolic Panel    Collection Time: 08/14/24  1:26 PM    Specimen: Blood   Result Value Ref Range    Glucose 112 (H) 65 - 99 mg/dL    BUN 21 8 - 23 mg/dL    Creatinine 1.22 0.76 - 1.27 mg/dL    Sodium 138 136 - 145 mmol/L    Potassium 4.4 3.5 - 5.2 mmol/L    Chloride 99 98 - 107 mmol/L    CO2 30.9 (H) 22.0 - 29.0 mmol/L    Calcium 9.5 8.2 - 9.6 mg/dL    Total Protein 7.3 6.0 - 8.5 g/dL    Albumin 4.3 3.5 - 5.2 g/dL    ALT (SGPT) 22 1 - 41 U/L    AST (SGOT) 30 1 - 40 U/L    Alkaline Phosphatase 94 39 - 117 U/L    Total Bilirubin 0.6 0.0 - 1.2 mg/dL    Globulin 3.0 gm/dL    A/G Ratio 1.4 g/dL    BUN/Creatinine Ratio 17.2 7.0 - 25.0    Anion Gap 8.1 5.0 - 15.0 mmol/L    eGFR 56.3 (L) >60.0 mL/min/1.73   Acetaminophen Level     Collection Time: 08/14/24  1:26 PM    Specimen: Blood   Result Value Ref Range    Acetaminophen <5.0 0.0 - 30.0 mcg/mL   Ethanol    Collection Time: 08/14/24  1:26 PM    Specimen: Blood   Result Value Ref Range    Ethanol <10 0 - 10 mg/dL    Ethanol % <0.010 %   Salicylate Level    Collection Time: 08/14/24  1:26 PM    Specimen: Blood   Result Value Ref Range    Salicylate <0.3 <=30.0 mg/dL   CK    Collection Time: 08/14/24  1:26 PM    Specimen: Blood   Result Value Ref Range    Creatine Kinase 197 20 - 200 U/L   Magnesium    Collection Time: 08/14/24  1:26 PM    Specimen: Blood   Result Value Ref Range    Magnesium 1.9 1.6 - 2.4 mg/dL   Phosphorus    Collection Time: 08/14/24  1:26 PM    Specimen: Blood   Result Value Ref Range    Phosphorus 3.8 2.5 - 4.5 mg/dL   TSH    Collection Time: 08/14/24  1:26 PM    Specimen: Blood   Result Value Ref Range    TSH 1.200 0.270 - 4.200 uIU/mL   CBC Auto Differential    Collection Time: 08/14/24  1:26 PM    Specimen: Blood   Result Value Ref Range    WBC 7.34 3.40 - 10.80 10*3/mm3    RBC 4.32 4.14 - 5.80 10*6/mm3    Hemoglobin 12.8 (L) 13.0 - 17.7 g/dL    Hematocrit 37.0 (L) 37.5 - 51.0 %    MCV 85.6 79.0 - 97.0 fL    MCH 29.6 26.6 - 33.0 pg    MCHC 34.6 31.5 - 35.7 g/dL    RDW 14.3 12.3 - 15.4 %    RDW-SD 44.8 37.0 - 54.0 fl    MPV 10.1 6.0 - 12.0 fL    Platelets 226 140 - 450 10*3/mm3    Neutrophil % 70.3 42.7 - 76.0 %    Lymphocyte % 18.4 (L) 19.6 - 45.3 %    Monocyte % 8.0 5.0 - 12.0 %    Eosinophil % 2.5 0.3 - 6.2 %    Basophil % 0.5 0.0 - 1.5 %    Immature Grans % 0.3 0.0 - 0.5 %    Neutrophils, Absolute 5.16 1.70 - 7.00 10*3/mm3    Lymphocytes, Absolute 1.35 0.70 - 3.10 10*3/mm3    Monocytes, Absolute 0.59 0.10 - 0.90 10*3/mm3    Eosinophils, Absolute 0.18 0.00 - 0.40 10*3/mm3    Basophils, Absolute 0.04 0.00 - 0.20 10*3/mm3    Immature Grans, Absolute 0.02 0.00 - 0.05 10*3/mm3    nRBC 0.0 0.0 - 0.2 /100 WBC   Urinalysis With Microscopic If Indicated (No Culture) -  Urine, Clean Catch    Collection Time: 08/14/24  2:44 PM    Specimen: Urine, Clean Catch   Result Value Ref Range    Color, UA Dark Yellow (A) Yellow, Straw    Appearance, UA Clear Clear    pH, UA 5.5 5.0 - 8.0    Specific Gravity, UA 1.013 1.005 - 1.030    Glucose, UA Negative Negative    Ketones, UA Negative Negative    Bilirubin, UA Negative Negative    Blood, UA Negative Negative    Protein, UA 30 mg/dL (1+) (A) Negative    Leuk Esterase, UA Moderate (2+) (A) Negative    Nitrite, UA Negative Negative    Urobilinogen, UA 0.2 E.U./dL 0.2 - 1.0 E.U./dL   Urine Drug Screen - Urine, Clean Catch    Collection Time: 08/14/24  2:44 PM    Specimen: Urine, Clean Catch   Result Value Ref Range    THC, Screen, Urine Negative Negative    Phencyclidine (PCP), Urine Negative Negative    Cocaine Screen, Urine Negative Negative    Methamphetamine, Ur Negative Negative    Opiate Screen Negative Negative    Amphetamine Screen, Urine Negative Negative    Benzodiazepine Screen, Urine Negative Negative    Tricyclic Antidepressants Screen Negative Negative    Methadone Screen, Urine Negative Negative    Barbiturates Screen, Urine Negative Negative    Oxycodone Screen, Urine Negative Negative    Buprenorphine, Screen, Urine Negative Negative   Fentanyl, Urine - Urine, Clean Catch    Collection Time: 08/14/24  2:44 PM    Specimen: Urine, Clean Catch   Result Value Ref Range    Fentanyl, Urine Negative Negative   Urinalysis, Microscopic Only - Urine, Clean Catch    Collection Time: 08/14/24  2:44 PM    Specimen: Urine, Clean Catch   Result Value Ref Range    RBC, UA None Seen None Seen, 0-2 /HPF    WBC, UA 6-10 (A) None Seen, 0-2 /HPF    Bacteria, UA Trace (A) None Seen /HPF    Squamous Epithelial Cells, UA 3-6 (A) None Seen, 0-2 /HPF    Hyaline Casts, UA 0-2 None Seen /LPF    Methodology Manual Light Microscopy        If labs were ordered, I independently reviewed the results and considered them in treating the  patient.        RADIOLOGY  CT Head Without Contrast    Result Date: 8/14/2024  PROCEDURE: CT HEAD WO CONTRAST-  HISTORY: AMS  COMPARISON: 4/30/2022  TECHNIQUE: Noncontrast exam  FINDINGS: Moderate atrophy and chronic ischemic white matter changes are noted.  No cortical edema is present. There is no mass or hemorrhage. Ventricles are normal.  Bone windows show no skull fracture or obvious destructive lesion.      1. No acute intracranial abnormality or obvious mass. 2. Atrophy and chronic ischemic white matter changes as above.   This study was performed with techniques to keep radiation doses as low as reasonably achievable (ALARA). Individualized dose reduction techniques using automated exposure control or adjustment of vA and/or kV according to the patient size were employed.    This report was signed and finalized on 8/14/2024 2:37 PM by Rob Newman MD.           PROCEDURES    Procedures    Interpretations    O2 Sat: The patients oxygen saturation was 94% on Room Air.  This was independently interpreted by me as Normal    EKG: I reviewed and independently interpreted the EKG as sinus rhythm at 68 bpm, normal axis, normal intervals, no ST elevation, no T wave inversions    Cardiac Monitoring: I reviewed and independently interpreted the Rhythm Strip as Normal Sinus rhythm rate of 68    Radiology: I ordered and independently reviewed the above noted radiographic studies.  I viewed images of CT Head which showed No intracranial hemorrhage per my independent interpretation. See radiologist's dictation for official interpretation.         MEDICATIONS GIVEN IN ER    Medications   sodium chloride 0.9 % flush 10 mL (has no administration in time range)   donepezil (ARICEPT) tablet 5 mg (5 mg Oral Given 8/14/24 2034)   amLODIPine (NORVASC) tablet 10 mg (10 mg Oral Given 8/14/24 1840)   carvedilol (COREG) tablet 3.125 mg (3.125 mg Oral Given 8/14/24 1841)   sodium chloride 0.9 % flush 10 mL (10 mL Intravenous Given  8/14/24 2036)   sodium chloride 0.9 % flush 10 mL (has no administration in time range)   sodium chloride 0.9 % infusion 40 mL (has no administration in time range)   ondansetron (ZOFRAN) injection 4 mg (has no administration in time range)   heparin (porcine) 5000 UNIT/ML injection 5,000 Units (5,000 Units Subcutaneous Given 8/14/24 2034)   nitroglycerin (NITROSTAT) SL tablet 0.4 mg (has no administration in time range)   sodium chloride 0.9 % infusion (100 mL/hr Intravenous New Bag 8/14/24 1841)   Potassium Replacement - Follow Nurse / BPA Driven Protocol (has no administration in time range)   Magnesium Standard Dose Replacement - Follow Nurse / BPA Driven Protocol (has no administration in time range)   Phosphorus Replacement - Follow Nurse / BPA Driven Protocol (has no administration in time range)   Calcium Replacement - Follow Nurse / BPA Driven Protocol (has no administration in time range)   QUEtiapine (SEROquel) tablet 25 mg (25 mg Oral Given 8/14/24 2034)         MEDICAL DECISION MAKING, PROGRESS, and CONSULTS    All labs, if obtained, have been independently reviewed by me.  All radiology studies, if obtained, have been reviewed by me and the radiologist dictating the report.  All EKG's, if obtained, have been independently viewed and interpreted by me      Discussion below represents my analysis of pertinent findings related to patient's condition, differential diagnosis, treatment plan and final disposition.      Differential diagnosis:    89-year-old male presented ED with complaint of altered mental status, is quite disheveled appearing, according to EMS he apparently was found in a fairly squalor type environment, concern for electrolyte abnormalities, possible infectious process given the patient does not appear to be taking care of himself, versus simply worsening dementia.  Will obtain labs, CT of the head, consult social work for possible APS consult    Additional Sources:  Discussed/ obtained  information from independent historians:  EMS  External (non-ED) record review:  Primary care note 7/26/2024 regarding memory problems   Chronic or social conditions impacting care:  Dementia      Orders placed during this visit:  Orders Placed This Encounter   Procedures    CT Head Without Contrast    Comprehensive Metabolic Panel    Urinalysis With Microscopic If Indicated (No Culture) - Urine, Clean Catch    Urine Drug Screen - Urine, Clean Catch    Acetaminophen Level    Ethanol    Salicylate Level    CK    Magnesium    Phosphorus    TSH    CBC Auto Differential    Fentanyl, Urine - Urine, Clean Catch    Urinalysis, Microscopic Only - Urine, Clean Catch    Basic Metabolic Panel    CBC Auto Differential    Diet: Cardiac; Healthy Heart (2-3 Na+); Fluid Consistency: Thin (IDDSI 0)    Vital Signs    Up With Assistance    Intake & Output    Weigh Patient    Oral Care    Maintain IV Access    Telemetry - Place Orders & Notify Provider of Results When Patient Experiences Acute Chest Pain, Dysrhythmia or Respiratory Distress    May Be Off Telemetry for Tests    Notify Provider (With Default Parameters)    Discontinue Cardiac Monitoring    Code Status and Medical Interventions: No CPR (Do Not Attempt to Resuscitate); Limited Support; No intubation (DNI)    Inpatient Case Management  Consult    Inpatient Nutrition Consult    ECG 12 Lead Altered Mental Status    Wound Ostomy Eval & Treat    Insert Peripheral IV    Insert Peripheral IV    Inpatient Admission    CBC & Differential         Additional orders considered but not ordered:  None    ED Course:    Consultants:  Hospitalist and case management, Adult Protective Services    ED Course as of 08/14/24 2043   Wed Aug 14, 2024   1423 Dr Kerley to the  [facility], APS consult has been filed, APS worker on the way to the hospital currently [CS]   1423 CBC & Differential(!)  Hemoglobin at baseline [CS]   1423 Ethanol  Negative alcohol level [CS]    1518 Urine Drug Screen - Urine, Clean Catch  UDS negative [CS]   1624 APS has finished their evaluation, do not feel that they can provide a safe discharge plan for the patient, through consult with case management recommend admission to the hospital.  I did speak directly to Dr Kerley who agrees to evaluate the patient for admission [CS]      ED Course User Index  [CS] Noah Metzger MD           After my consideration of clinical presentation and any laboratory/radiology studies obtained, I discussed the findings with the patient/patient representative who is in agreement with the treatment plan and the final disposition. Risks and benefits of admission was discussed     AS OF 20:43 EDT VITALS:    BP - 133/64  HR - 69  TEMP - 96.3 °F (35.7 °C) (Oral)  O2 SATS - 96%    I reviewed the patients prescription monitoring report if available prior to discharge    DIAGNOSIS  Final diagnoses:   Failure to thrive in adult   Moderate dementia, unspecified dementia type, unspecified whether behavioral, psychotic, or mood disturbance or anxiety         DISPOSITION  ED Disposition       ED Disposition   Decision to Admit    Condition   --    Comment   Level of Care: Med/Surg [1]   Diagnosis: Dementia [361717]   Certification: I Certify That Inpatient Hospital Services Are Medically Necessary For Greater Than 2 Midnights                     Please note that portions of this document were completed with voice recognition software.        Noah Metzger MD  08/14/24 2044      Electronically signed by Noah Metzger MD at 08/14/24 2044       Basim Elliott at 08/14/24 1307          At this time I contacted [facility] for our charge RN to verify if pt had spoken with the [facility] department     Electronically signed by Basim Elliott at 08/14/24 1308       Vital Signs (last day)       Date/Time Temp Temp src Pulse Resp BP Patient Position SpO2    08/15/24 0700 97.5 (36.4) Oral  68 18 197/88 Sitting 90    08/15/24 0331 97.9 (36.6) Oral 62 18 160/72 Lying 97    08/14/24 2327 96.5 (35.8) Oral 60 16 152/66 Lying 95    08/14/24 1900 96.3 (35.7) Oral 69 15 133/64 Lying 96    08/14/24 1736 96.1 (35.6) Oral 71 20 194/84 Lying 100    08/14/24 1700 -- -- -- -- 188/92 -- --    08/14/24 1630 -- -- -- -- 159/87 -- --    08/14/24 15:26:21 -- -- 71 23 173/98 -- 96    08/14/24 1400 -- -- 68 -- 160/88 -- 98    08/14/24 1309 -- -- -- -- 142/74 Lying --    08/14/24 1302 98.1 (36.7) Oral 68 18 -- -- 94          Current Facility-Administered Medications   Medication Dose Route Frequency Provider Last Rate Last Admin    amLODIPine (NORVASC) tablet 10 mg  10 mg Oral Daily Kerley, Brian Joseph, DO   10 mg at 08/14/24 1840    Calcium Replacement - Follow Nurse / BPA Driven Protocol   Does not apply PRN Kerley, Brian Joseph, DO        carvedilol (COREG) tablet 3.125 mg  3.125 mg Oral BID With Meals Kerley, Brian Joseph, DO   3.125 mg at 08/14/24 1841    donepezil (ARICEPT) tablet 5 mg  5 mg Oral Nightly Kerley, Brian Joseph, DO   5 mg at 08/14/24 2034    heparin (porcine) 5000 UNIT/ML injection 5,000 Units  5,000 Units Subcutaneous Q12H Kerley, Brian Joseph, DO   5,000 Units at 08/14/24 2034    Magnesium Standard Dose Replacement - Follow Nurse / BPA Driven Protocol   Does not apply PRN Kerley, Brian Joseph, DO        nitroglycerin (NITROSTAT) SL tablet 0.4 mg  0.4 mg Sublingual Q5 Min PRN Kerley, Brian Joseph, DO        ondansetron (ZOFRAN) injection 4 mg  4 mg Intravenous Q6H PRN Kerley, Brian Joseph, DO        Phosphorus Replacement - Follow Nurse / BPA Driven Protocol   Does not apply PRN Kerley, Brian Joseph, DO        Potassium Replacement - Follow Nurse / BPA Driven Protocol   Does not apply PRN Kerley, Brian Joseph, DO        QUEtiapine (SEROquel) tablet 25 mg  25 mg Oral Nightly Kerley, Brian Joseph, DO   25 mg at 08/14/24 2034    sodium chloride 0.9 % flush 10 mL  10 mL Intravenous PRN Kerley, Brian  Reid, DO        sodium chloride 0.9 % flush 10 mL  10 mL Intravenous Q12H KerleyWally, DO   10 mL at 08/14/24 2036    sodium chloride 0.9 % flush 10 mL  10 mL Intravenous PRN Kerley, Brian Joseph, DO        sodium chloride 0.9 % infusion 40 mL  40 mL Intravenous PRN Kerley, Brian Joseph, DO        sodium chloride 0.9 % infusion  100 mL/hr Intravenous Continuous Kerley, Brian Reid,  mL/hr at 08/15/24 0657 100 mL/hr at 08/15/24 0657     Lab Results (last 24 hours)       Procedure Component Value Units Date/Time    Basic Metabolic Panel [[id number]]  (Normal) Collected: 08/15/24 0539    Specimen: Blood Updated: 08/15/24 0632     Glucose 97 mg/dL      BUN 18 mg/dL      Creatinine 0.86 mg/dL      Sodium 139 mmol/L      Potassium 4.0 mmol/L      Chloride 103 mmol/L      CO2 27.8 mmol/L      Calcium 9.1 mg/dL      BUN/Creatinine Ratio 20.9     Anion Gap 8.2 mmol/L      eGFR 82.3 mL/min/1.73     Narrative:      GFR Normal >60  Chronic Kidney Disease <60  Kidney Failure <15    The GFR formula is only valid for adults with stable renal function between ages 18 and 70.    CBC Auto Differential [[id number]]  (Abnormal) Collected: 08/15/24 0539    Specimen: Blood Updated: 08/15/24 0618     WBC 5.67 10*3/mm3      RBC 4.18 10*6/mm3      Hemoglobin 12.0 g/dL      Hematocrit 35.9 %      MCV 85.9 fL      MCH 28.7 pg      MCHC 33.4 g/dL      RDW 14.0 %      RDW-SD 43.6 fl      MPV 10.0 fL      Platelets 198 10*3/mm3      Neutrophil % 62.7 %      Lymphocyte % 22.8 %      Monocyte % 9.7 %      Eosinophil % 3.9 %      Basophil % 0.5 %      Immature Grans % 0.4 %      Neutrophils, Absolute 3.56 10*3/mm3      Lymphocytes, Absolute 1.29 10*3/mm3      Monocytes, Absolute 0.55 10*3/mm3      Eosinophils, Absolute 0.22 10*3/mm3      Basophils, Absolute 0.03 10*3/mm3      Immature Grans, Absolute 0.02 10*3/mm3      nRBC 0.0 /100 WBC     Urinalysis, Microscopic Only - Urine, Clean Catch [[id number]]  (Abnormal) Collected: 08/14/24  1444    Specimen: Urine, Clean Catch Updated: 08/14/24 1542     RBC, UA None Seen /HPF      WBC, UA 6-10 /HPF      Bacteria, UA Trace /HPF      Squamous Epithelial Cells, UA 3-6 /HPF      Hyaline Casts, UA 0-2 /LPF      Methodology Manual Light Microscopy    Urinalysis With Microscopic If Indicated (No Culture) - Urine, Clean Catch [[id number]]  (Abnormal) Collected: 08/14/24 1444    Specimen: Urine, Clean Catch Updated: 08/14/24 1534     Color, UA Dark Yellow     Appearance, UA Clear     pH, UA 5.5     Specific Gravity, UA 1.013     Glucose, UA Negative     Ketones, UA Negative     Bilirubin, UA Negative     Blood, UA Negative     Protein, UA 30 mg/dL (1+)     Leuk Esterase, UA Moderate (2+)     Nitrite, UA Negative     Urobilinogen, UA 0.2 E.U./dL    Fentanyl, Urine - Urine, Clean Catch [[id number]]  (Normal) Collected: 08/14/24 1444    Specimen: Urine, Clean Catch Updated: 08/14/24 1501     Fentanyl, Urine Negative    Narrative:      Negative Threshold:      Fentanyl 5 ng/mL     The normal value for the drug tested is negative. This report includes final unconfirmed screening results to be used for medical treatment purposes only. Unconfirmed results must not be used for non-medical purposes such as employment or legal testing. Clinical consideration should be applied to any drug of abuse test, particularly when unconfirmed results are used.           Urine Drug Screen - Urine, Clean Catch [[id number]]  (Normal) Collected: 08/14/24 1444    Specimen: Urine, Clean Catch Updated: 08/14/24 1459     THC, Screen, Urine Negative     Phencyclidine (PCP), Urine Negative     Cocaine Screen, Urine Negative     Methamphetamine, Ur Negative     Opiate Screen Negative     Amphetamine Screen, Urine Negative     Benzodiazepine Screen, Urine Negative     Tricyclic Antidepressants Screen Negative     Methadone Screen, Urine Negative     Barbiturates Screen, Urine Negative     Oxycodone Screen, Urine Negative     Buprenorphine,  Screen, Urine Negative    Narrative:      Cutoff For Drugs Screened:    Amphetamines               500 ng/ml  Barbiturates               200 ng/ml  Benzodiazepines            150 ng/ml  Cocaine                    150 ng/ml  Methadone                  200 ng/ml  Opiates                    100 ng/ml  Phencyclidine               25 ng/ml  THC                         50 ng/ml  Methamphetamine            500 ng/ml  Tricyclic Antidepressants  300 ng/ml  Oxycodone                  100 ng/ml  Buprenorphine               10 ng/ml    The normal value for all drugs tested is negative. This report includes unconfirmed screening results, with the cutoff values listed, to be used for medical treatment purposes only.  Unconfirmed results must not be used for non-medical purposes such as employment or legal testing.  Clinical consideration should be applied to any drug of abuse test, particularly when unconfirmed results are used.      TSH [[id number]]  (Normal) Collected: 08/14/24 1326    Specimen: Blood Updated: 08/14/24 1411     TSH 1.200 uIU/mL     Acetaminophen Level [[id number]]  (Normal) Collected: 08/14/24 1326    Specimen: Blood Updated: 08/14/24 1400     Acetaminophen <5.0 mcg/mL     Narrative:      Toxic = Greater than 150 mcg/mL    Ethanol [[id number]] Collected: 08/14/24 1326    Specimen: Blood Updated: 08/14/24 1400     Ethanol <10 mg/dL      Ethanol % <0.010 %     Narrative:      This result is for medical use only and should not be used for forensic purposes.    Salicylate Level [[id number]]  (Normal) Collected: 08/14/24 1326    Specimen: Blood Updated: 08/14/24 1400     Salicylate <0.3 mg/dL     CK [[id number]]  (Normal) Collected: 08/14/24 1326    Specimen: Blood Updated: 08/14/24 1400     Creatine Kinase 197 U/L     Comprehensive Metabolic Panel [[id number]]  (Abnormal) Collected: 08/14/24 1326    Specimen: Blood Updated: 08/14/24 1400     Glucose 112 mg/dL      BUN 21 mg/dL      Creatinine 1.22 mg/dL      Sodium 138  mmol/L      Potassium 4.4 mmol/L      Chloride 99 mmol/L      CO2 30.9 mmol/L      Calcium 9.5 mg/dL      Total Protein 7.3 g/dL      Albumin 4.3 g/dL      ALT (SGPT) 22 U/L      AST (SGOT) 30 U/L      Alkaline Phosphatase 94 U/L      Total Bilirubin 0.6 mg/dL      Globulin 3.0 gm/dL      A/G Ratio 1.4 g/dL      BUN/Creatinine Ratio 17.2     Anion Gap 8.1 mmol/L      eGFR 56.3 mL/min/1.73     Narrative:      GFR Normal >60  Chronic Kidney Disease <60  Kidney Failure <15    The GFR formula is only valid for adults with stable renal function between ages 18 and 70.    Magnesium [[id number]]  (Normal) Collected: 08/14/24 1326    Specimen: Blood Updated: 08/14/24 1400     Magnesium 1.9 mg/dL     Phosphorus [[id number]]  (Normal) Collected: 08/14/24 1326    Specimen: Blood Updated: 08/14/24 1400     Phosphorus 3.8 mg/dL     CBC & Differential [[id number]]  (Abnormal) Collected: 08/14/24 1326    Specimen: Blood Updated: 08/14/24 1336    Narrative:      The following orders were created for panel order CBC & Differential.  Procedure                               Abnormality         Status                     ---------                               -----------         ------                     CBC Auto Differential[[id number]]        Abnormal            Final result                 Please view results for these tests on the individual orders.    CBC Auto Differential [[id number]]  (Abnormal) Collected: 08/14/24 1326    Specimen: Blood Updated: 08/14/24 1336     WBC 7.34 10*3/mm3      RBC 4.32 10*6/mm3      Hemoglobin 12.8 g/dL      Hematocrit 37.0 %      MCV 85.6 fL      MCH 29.6 pg      MCHC 34.6 g/dL      RDW 14.3 %      RDW-SD 44.8 fl      MPV 10.1 fL      Platelets 226 10*3/mm3      Neutrophil % 70.3 %      Lymphocyte % 18.4 %      Monocyte % 8.0 %      Eosinophil % 2.5 %      Basophil % 0.5 %      Immature Grans % 0.3 %      Neutrophils, Absolute 5.16 10*3/mm3      Lymphocytes, Absolute 1.35 10*3/mm3      Monocytes,  Absolute 0.59 10*3/mm3      Eosinophils, Absolute 0.18 10*3/mm3      Basophils, Absolute 0.04 10*3/mm3      Immature Grans, Absolute 0.02 10*3/mm3      nRBC 0.0 /100 WBC           Imaging Results (Last 24 Hours)       Procedure Component Value Units Date/Time    CT Head Without Contrast [[id number]] Collected: 08/14/24 1436     Updated: 08/14/24 1439    Narrative:      PROCEDURE: CT HEAD WO CONTRAST-     HISTORY: AMS     COMPARISON: 4/30/2022     TECHNIQUE: Noncontrast exam     FINDINGS: Moderate atrophy and chronic ischemic white matter changes are  noted.     No cortical edema is present. There is no mass or hemorrhage. Ventricles  are normal.     Bone windows show no skull fracture or obvious destructive lesion.       Impression:      1. No acute intracranial abnormality or obvious mass.   2. Atrophy and chronic ischemic white matter changes as above.        This study was performed with techniques to keep radiation doses as low  as reasonably achievable (ALARA). Individualized dose reduction  techniques using automated exposure control or adjustment of vA and/or  kV according to the patient size were employed.            This report was signed and finalized on 8/14/2024 2:37 PM by Rob Newman MD.             Physician Progress Notes (last 24 hours)  Notes from 08/14/24 0834 through 08/15/24 0834   No notes of this type exist for this encounter.

## 2024-08-15 NOTE — CASE MANAGEMENT/SOCIAL WORK
Case Management Discharge Note                Selected Continued Care - Admitted Since 8/14/2024       Destination    No services have been selected for the patient.                Durable Medical Equipment    No services have been selected for the patient.                Dialysis/Infusion    No services have been selected for the patient.                Home Medical Care    No services have been selected for the patient.                Therapy    No services have been selected for the patient.                Community Resources    No services have been selected for the patient.                [facility]    No services have been selected for the patient.                    Transportation Services  Private: Car    Final Discharge Disposition Code: 01 - home or self-care

## 2024-08-15 NOTE — OUTREACH NOTE
Prep Survey      Flowsheet Row Responses   [facility] patient discharged from? [place]   Is LACE score < 7 ? No   Eligibility [facility]   Date of Admission 08/14/24   Date of Discharge 08/15/24   Discharge Disposition Home or Self Care   Discharge diagnosis Dementia   Does the patient have one of the following disease processes/diagnoses(primary or secondary)? Other   Does the patient have Home health ordered? No   Is there a DME ordered? No   Medication alerts for this patient see AVS   Prep survey completed? Yes            Doris PEDRO - Registered Nurse

## 2024-08-15 NOTE — CASE MANAGEMENT/SOCIAL WORK
Case Management/Social Work    Patient Name:  Sukumar Cosby  YOB: 1934  MRN: [id number]  Admit Date:  8/14/2024        DOMINGO spoke with sister, Ashwini and her grandson Marcin, over telephone regarding dc plan. She lives in [place] and will be coming to take pt home with her. Her address is [address] [place] [address]. She will be caring for pt as he can not return home alone at this time. DOMINGO updated team. DOMINGO spoke with APS worker Michele who would like to speak with sister when she arrives at hospital to discuss plan after d/c. APS will be here in a few hours. Ashwini is leaving her house soon and it takes over 2 hours to get here.     15:11 EDT Sister arrived to bedside. ROLANDA Bautista and her supervisor was present as well. APS approved pt to go home with sister in [facility] and will follow up with pt there. No concerns present at this time. Pt is agreeable to go with sister at this time. APS advised sister to work on applying for guardianship in [facility]. Pt ready for dc after pharmacy meets with sister regarding medications.     Electronically signed by:  IMER Archibald  08/15/24 11:09 EDT

## 2024-08-15 NOTE — TELEPHONE ENCOUNTER
Hospital called f/u for this pt but next available is 9/10 and they want him to be seen within the next 3 days. Pls advise

## 2024-08-16 ENCOUNTER — TRANSITIONAL CARE MANAGEMENT TELEPHONE ENCOUNTER (OUTPATIENT)
Dept: CALL CENTER | Facility: HOSPITAL | Age: 89
End: 2024-08-16
Payer: MEDICARE

## 2024-08-16 NOTE — OUTREACH NOTE
Call Center TCM Note      Flowsheet Row Responses   [facility] patient discharged from? [place]   Does the patient have one of the following disease processes/diagnoses(primary or secondary)? Other   TCM attempt successful? No   Unsuccessful attempts Attempt 1            Payal Turner RN    8/16/2024, 15:18 EDT

## 2024-08-16 NOTE — OUTREACH NOTE
Call Center TCM Note      Flowsheet Row Responses   [facility] patient discharged from? [place]   Does the patient have one of the following disease processes/diagnoses(primary or secondary)? Other   TCM attempt successful? No   Unsuccessful attempts Attempt 2  [Attempted to reach patient and friend, Martin Contreras, listed on PCP verbal release. No answer.]            Payal Turner RN    8/16/2024, 16:48 EDT

## 2024-08-17 ENCOUNTER — TRANSITIONAL CARE MANAGEMENT TELEPHONE ENCOUNTER (OUTPATIENT)
Dept: CALL CENTER | Facility: HOSPITAL | Age: 89
End: 2024-08-17
Payer: MEDICARE

## 2024-08-17 NOTE — OUTREACH NOTE
Call Center TCM Note      Flowsheet Row Responses   [facility] patient discharged from? [place]   Does the patient have one of the following disease processes/diagnoses(primary or secondary)? Other   TCM attempt successful? No   Unsuccessful attempts Attempt 3  [No answer.]            Sonali Wilkins RN    8/17/2024, 10:33 EDT

## 2024-09-25 DIAGNOSIS — I10 ESSENTIAL HYPERTENSION: ICD-10-CM

## 2024-09-25 RX ORDER — AMLODIPINE BESYLATE 10 MG/1
10 TABLET ORAL DAILY
Qty: 30 TABLET | Refills: 0 | Status: SHIPPED | OUTPATIENT
Start: 2024-09-25

## 2024-10-23 DIAGNOSIS — I10 ESSENTIAL HYPERTENSION: ICD-10-CM

## 2024-10-24 RX ORDER — AMLODIPINE BESYLATE 10 MG/1
10 TABLET ORAL DAILY
Qty: 30 TABLET | Refills: 0 | Status: SHIPPED | OUTPATIENT
Start: 2024-10-24

## 2025-03-03 RX ORDER — DONEPEZIL HYDROCHLORIDE 5 MG/1
5 TABLET, FILM COATED ORAL NIGHTLY
Qty: 90 TABLET | Refills: 3 | OUTPATIENT
Start: 2025-03-03